# Patient Record
Sex: MALE | Race: WHITE | NOT HISPANIC OR LATINO | Employment: OTHER | URBAN - METROPOLITAN AREA
[De-identification: names, ages, dates, MRNs, and addresses within clinical notes are randomized per-mention and may not be internally consistent; named-entity substitution may affect disease eponyms.]

---

## 2017-01-14 DIAGNOSIS — N40.0 BENIGN PROSTATIC HYPERPLASIA, PRESENCE OF LOWER URINARY TRACT SYMPTOMS UNSPECIFIED, UNSPECIFIED MORPHOLOGY: ICD-10-CM

## 2017-01-14 DIAGNOSIS — Q61.3 POLYCYSTIC KIDNEY DISEASE: ICD-10-CM

## 2017-01-14 DIAGNOSIS — I10 ESSENTIAL HYPERTENSION: Primary | ICD-10-CM

## 2017-01-16 NOTE — TELEPHONE ENCOUNTER
Tamsulosin         Last Written Prescription Date: 01/20/16  Last Fill Quantity: 90, # refills: 3    Last Office Visit with [facility], [facility] or [facility] prescribing provider:  10/27/16   Future Office Visit:      BP Readings from Last 3 Encounters:   10/27/16 126/78   10/12/16 123/74   08/29/16 116/75     Benicar      Last Written Prescription Date: 01/20/16  Last Fill Quantity: 90, # refills: 3  Last Office Visit with [facility], [facility] or [facility] prescribing provider: 10/27/16       POTASSIUM   Date Value Ref Range Status   10/27/2016 4.5 3.4 - 5.3 mmol/L Final     CREATININE   Date Value Ref Range Status   10/27/2016 1.21 0.66 - 1.25 mg/dL Final     BP Readings from Last 3 Encounters:   10/27/16 126/78   10/12/16 123/74   08/29/16 116/75     Moon See MA

## 2017-01-17 RX ORDER — TAMSULOSIN HYDROCHLORIDE 0.4 MG/1
CAPSULE ORAL
Qty: 30 CAPSULE | Refills: 0 | Status: SHIPPED | OUTPATIENT
Start: 2017-01-17 | End: 2017-02-03

## 2017-01-17 RX ORDER — OLMESARTAN MEDOXOMIL 40 MG/1
TABLET, FILM COATED ORAL
Qty: 30 TABLET | Refills: 0 | Status: SHIPPED | OUTPATIENT
Start: 2017-01-17 | End: 2017-02-03

## 2017-01-17 NOTE — TELEPHONE ENCOUNTER
Routing refill request to provider for review/approval because:  Patient needs to be seen because:  Needs to establish care with Dr Maldonado at the [facility].    PCP:  Medications pended.   Please review and approve as appropriate. Will need diagnosis added to tamsulosin.  Patient has appointment to establish care on 1/26.    Thank you    Jen Yang RN

## 2017-01-17 NOTE — TELEPHONE ENCOUNTER
TC's:    Please call Patient to schedule an appointment to establish care. Former [facility] patient.  Then route back to the refill pool.    Thank you      Jen aYng RN

## 2017-02-03 ENCOUNTER — OFFICE VISIT (OUTPATIENT)
Dept: FAMILY MEDICINE | Facility: CLINIC | Age: 77
End: 2017-02-03
Payer: MEDICARE

## 2017-02-03 VITALS
DIASTOLIC BLOOD PRESSURE: 80 MMHG | HEART RATE: 83 BPM | HEIGHT: 69 IN | BODY MASS INDEX: 40.29 KG/M2 | TEMPERATURE: 97.8 F | OXYGEN SATURATION: 97 % | WEIGHT: 272 LBS | SYSTOLIC BLOOD PRESSURE: 130 MMHG

## 2017-02-03 DIAGNOSIS — E11.22 TYPE 2 DIABETES MELLITUS WITH STAGE 3 CHRONIC KIDNEY DISEASE, WITHOUT LONG-TERM CURRENT USE OF INSULIN (H): Primary | ICD-10-CM

## 2017-02-03 DIAGNOSIS — N18.30 TYPE 2 DIABETES MELLITUS WITH STAGE 3 CHRONIC KIDNEY DISEASE, WITHOUT LONG-TERM CURRENT USE OF INSULIN (H): Primary | ICD-10-CM

## 2017-02-03 DIAGNOSIS — N40.0 BENIGN PROSTATIC HYPERPLASIA, PRESENCE OF LOWER URINARY TRACT SYMPTOMS UNSPECIFIED, UNSPECIFIED MORPHOLOGY: ICD-10-CM

## 2017-02-03 DIAGNOSIS — I10 ESSENTIAL HYPERTENSION: ICD-10-CM

## 2017-02-03 LAB — HBA1C MFR BLD: 6.3 % (ref 4.3–6)

## 2017-02-03 PROCEDURE — 36415 COLL VENOUS BLD VENIPUNCTURE: CPT | Performed by: INTERNAL MEDICINE

## 2017-02-03 PROCEDURE — 83036 HEMOGLOBIN GLYCOSYLATED A1C: CPT | Performed by: INTERNAL MEDICINE

## 2017-02-03 PROCEDURE — 80048 BASIC METABOLIC PNL TOTAL CA: CPT | Performed by: INTERNAL MEDICINE

## 2017-02-03 PROCEDURE — 99214 OFFICE O/P EST MOD 30 MIN: CPT | Performed by: INTERNAL MEDICINE

## 2017-02-03 RX ORDER — TAMSULOSIN HYDROCHLORIDE 0.4 MG/1
CAPSULE ORAL
Qty: 90 CAPSULE | Refills: 1 | Status: SHIPPED | OUTPATIENT
Start: 2017-02-03 | End: 2017-07-25

## 2017-02-03 RX ORDER — LANCETS
1 EACH MISCELLANEOUS DAILY
Qty: 100 EACH | Refills: 11 | Status: ON HOLD | OUTPATIENT
Start: 2017-02-03 | End: 2018-09-27

## 2017-02-03 RX ORDER — METFORMIN HCL 500 MG
TABLET, EXTENDED RELEASE 24 HR ORAL
Qty: 180 TABLET | Refills: 1 | Status: SHIPPED | OUTPATIENT
Start: 2017-02-03 | End: 2017-08-10

## 2017-02-03 RX ORDER — OLMESARTAN MEDOXOMIL 40 MG/1
40 TABLET ORAL DAILY
Qty: 90 TABLET | Refills: 1 | Status: SHIPPED | OUTPATIENT
Start: 2017-02-03 | End: 2017-07-25

## 2017-02-03 ASSESSMENT — ENCOUNTER SYMPTOMS
SHORTNESS OF BREATH: 0
WEIGHT LOSS: 0
HEADACHES: 0
PALPITATIONS: 0
NAUSEA: 0
ORTHOPNEA: 0
VOMITING: 0
ABDOMINAL PAIN: 0
FREQUENCY: 0
DIZZINESS: 0
PND: 0
DYSURIA: 0
COUGH: 0
SENSORY CHANGE: 0
FOCAL WEAKNESS: 0
CLAUDICATION: 0
HEMATURIA: 0

## 2017-02-03 NOTE — NURSING NOTE
"Chief Complaint   Patient presents with     Establish Care       Initial /85 mmHg  Pulse 83  Temp(Src) 97.8  F (36.6  C) (Oral)  Ht 5' 9\" (1.753 m)  Wt 272 lb (123.378 kg)  BMI 40.15 kg/m2  SpO2 97% Estimated body mass index is 40.15 kg/(m^2) as calculated from the following:    Height as of this encounter: 5' 9\" (1.753 m).    Weight as of this encounter: 272 lb (123.378 kg).  BP completed using cuff size: leti COTE CMA      "

## 2017-02-03 NOTE — PROGRESS NOTES
"HPI Comments:   Patient is a known diabetic and is currently on oral monotherapy with metformin. He checks his capillary blood glucose at home about 3 times a week and usually gets readings between 100-130.  His hemoglobin A1c today is 6.3.    He also has a history of hypertension and is currently on Benicar.  He is tolerating the medication well and needs a refill.    Also has a history of BPH and is on Flomax. Medication is working well for him.      Past Medical History   Diagnosis Date     Calculus of kidney 2002     with lithrotripsy      Osteomyelitis (H)      toe amputation     Diabetes mellitus (H)      BPH (benign prostatic hyperplasia)      flomax      Osteoarthritis, knee      has had synvisc type shot      Benign essential hypertension      Melanoma (H)        Review of Systems   Constitutional: Negative for weight loss and malaise/fatigue.   Respiratory: Negative for cough and shortness of breath.    Cardiovascular: Negative for chest pain, palpitations, orthopnea, claudication, leg swelling and PND.   Gastrointestinal: Negative for nausea, vomiting and abdominal pain.   Genitourinary: Negative for dysuria, urgency, frequency and hematuria.   Neurological: Negative for dizziness, sensory change, focal weakness and headaches.       /80 mmHg  Pulse 83  Temp(Src) 97.8  F (36.6  C) (Oral)  Ht 5' 9\" (1.753 m)  Wt 272 lb (123.378 kg)  BMI 40.15 kg/m2  SpO2 97%      Physical Exam   Constitutional: He is oriented to person, place, and time. No distress.   Eyes: Conjunctivae and EOM are normal. Pupils are equal, round, and reactive to light.   Neck: No thyromegaly present.   Cardiovascular: Normal rate, regular rhythm and normal heart sounds.    Pulmonary/Chest: Effort normal and breath sounds normal. No respiratory distress.   Musculoskeletal: He exhibits no edema.   Neurological: He is alert and oriented to person, place, and time. He has normal reflexes. Coordination normal. GCS score is 15. "   Nursing note and vitals reviewed.        ICD-10-CM    1. Type 2 diabetes mellitus with stage 3 chronic kidney disease, without long-term current use of insulin (H) E11.22 Hemoglobin A1c    N18.3 Basic metabolic panel     metFORMIN (GLUCOPHAGE-XR) 500 MG 24 hr tablet     blood glucose monitoring (RELION ULTIMA TEST) test strip     RELION ULTRA THIN PLUS LANCETS MISC   2. Essential hypertension I10 olmesartan (BENICAR) 40 MG tablet   3. Benign prostatic hyperplasia, presence of lower urinary tract symptoms unspecified, unspecified morphology N40.0 tamsulosin (FLOMAX) 0.4 MG capsule       Patient Instructions   Maintain low fat/calorie diet and regular exercise.    Follow up 6 months.

## 2017-02-03 NOTE — MR AVS SNAPSHOT
"              After Visit Summary   2/3/2017    Ross Caballero    MRN: [id number]           Patient Information     Date Of Birth          1940        Visit Information        Provider Department      2/3/2017 11:30 AM Errol Maldonado MD [facility]        Today's Diagnoses     Type 2 diabetes mellitus with stage 3 chronic kidney disease, without long-term current use of insulin (H)    -  1     Essential hypertension         Type 2 diabetes mellitus without complication, without long-term current use of insulin (H)         Benign prostatic hyperplasia, presence of lower urinary tract symptoms unspecified, unspecified morphology         Type 2 diabetes mellitus with other specified complication (H)           Care Instructions    Maintain low fat/calorie diet and regular exercise.    Follow up 6 months.        Follow-ups after your visit        Who to contact     If you have questions or need follow up information about today's clinic visit or your schedule please contact [facility] directly at [phone].  Normal or non-critical lab and imaging results will be communicated to you by MyChart, letter or phone within 4 business days after the clinic has received the results. If you do not hear from us within 7 days, please contact the clinic through [employer] or phone. If you have a critical or abnormal lab result, we will notify you by phone as soon as possible.  Submit refill requests through [employer] or call your pharmacy and they will forward the refill request to us. Please allow 3 business days for your refill to be completed.          Additional Information About Your Visit        [employer] Information     [employer] lets you send messages to your doctor, view your test results, renew your prescriptions, schedule appointments and more. To sign up, go to [email or web address] . Click on \"Log in\" on the left side of the screen, which will take you to the Welcome page. Then " "click on \"Sign up Now\" on the right side of the page.     You will be asked to enter the access code listed below, as well as some personal information. Please follow the directions to create your username and password.     Your access code is: [email or web address]  Expires: 2017 11:33 AM     Your access code will  in 90 days. If you need help or a new code, please call your [place] clinic or [phone].        Care EveryWhere ID     This is your Care EveryWhere ID. This could be used by other organizations to access your [place] medical records  [id number]        Your Vitals Were     Pulse Temperature Height BMI (Body Mass Index) Pulse Oximetry       83 97.8  F (36.6  C) (Oral) 5' 9\" (1.753 m) 40.15 kg/m2 97%        Blood Pressure from Last 3 Encounters:   17 141/85   10/27/16 126/78   10/12/16 123/74    Weight from Last 3 Encounters:   17 272 lb (123.378 kg)   10/27/16 270 lb (122.471 kg)   10/12/16 250 lb (113.399 kg)              We Performed the Following     Basic metabolic panel     Hemoglobin A1c          Today's Medication Changes          These changes are accurate as of: 2/3/17 11:33 AM.  If you have any questions, ask your nurse or doctor.               These medicines have changed or have updated prescriptions.        Dose/Directions    metFORMIN 500 MG 24 hr tablet   Commonly known as:  GLUCOPHAGE-XR   This may have changed:  See the new instructions.   Used for:  Type 2 diabetes mellitus without complication, without long-term current use of insulin (H)   Changed by:  Errol Maldonado MD        TAKE TWO TABLETS BY MOUTH ONCE DAILY WITH DINNER   Quantity:  180 tablet   Refills:  1       olmesartan 40 MG tablet   Commonly known as:  BENICAR   This may have changed:  See the new instructions.   Used for:  Essential hypertension   Changed by:  Errol Maldonado MD        Dose:  40 mg   Take 1 tablet (40 mg) by mouth daily   Quantity:  90 tablet "   Refills:  1       tamsulosin 0.4 MG capsule   Commonly known as:  FLOMAX   This may have changed:  See the new instructions.   Used for:  Benign prostatic hyperplasia, presence of lower urinary tract symptoms unspecified, unspecified morphology   Changed by:  Errol Maldonado MD        TAKE 1 CAPSULE AT BEDTIME   Quantity:  90 capsule   Refills:  1         Stop taking these medicines if you haven't already. Please contact your care team if you have questions.     HYDROcodone-acetaminophen 5-325 MG per tablet   Commonly known as:  NORCO   Stopped by:  Errol Maldonado MD                Where to get your medicines      These medications were sent to [facility] [place], [place] - [address] [address] to [address] [address]     Phone:  [phone]    - metFORMIN 500 MG 24 hr tablet  - olmesartan 40 MG tablet  - tamsulosin 0.4 MG capsule      These medications were sent to [facility] [address] [place] [address] - [address] [facility]  [address] [facility] [address]     Phone:  [phone]    - blood glucose monitoring test strip  - RELION ULTRA THIN PLUS LANCETS [facility]             Primary Care Provider Office Phone # Fax #    Errol Maldonado [phone]       [facility] [address]   [facility] [address]        Thank you!     Thank you for choosing [facility]  for your care. Our goal is always to provide you with excellent care. Hearing back from our patients is one way we can continue to improve our services. Please take a few minutes to complete the written survey that you may receive in the mail after your visit with us. Thank you!             Your Updated Medication List - Protect others around you: Learn how to safely use, store and throw away your medicines at www.disposemymeds.org.          This list is accurate as of:  2/3/17 11:33 AM.  Always use your most recent med list.                   Brand Name Dispense Instructions for use    blood glucose monitoring test strip    RELION ULTIMA TEST    1 Box    Use to test blood sugar 1 times daily or as directed.  Ok to substitute alternative if insurance prefers.       metFORMIN 500 MG 24 hr tablet    GLUCOPHAGE-XR    180 tablet    TAKE TWO TABLETS BY MOUTH ONCE DAILY WITH DINNER       multivitamin, therapeutic Tabs tablet      Take 1 tablet by mouth daily       olmesartan 40 MG tablet    BENICAR    90 tablet    Take 1 tablet (40 mg) by mouth daily       RELION ULTIMA GLUCOSE SYSTEM W/DEVICE Kit     1 kit    1 kit daily Use to test blood sugars 1 times daily or as directed.  Ok to substitute alternative if insurance prefers.       RELION ULTRA THIN PLUS LANCETS Misc     100 each    1 lancet daily Use to test blood sugars 1 times daily or as directed.  Ok to substitute alternative if insurance prefers.       tamsulosin 0.4 MG capsule    FLOMAX    90 capsule    TAKE 1 CAPSULE AT BEDTIME

## 2017-02-03 NOTE — Clinical Note
[facility]  [address]  [address]  Tel: [phone]    February 10, 2017    Ross Caballero  [address] [facility] [address]        Good day to you Mr. Caballero.    As we discussed over the phone, your diabetes is well controlled as reflected by your excellent hemoglobin A1c of 6.3 (our goal is to keep it below 8).    Your metabolic panel shows that your kidney function (creatinine) is still within acceptable limits and has not significantly changed.    Keep up the good work in controlling your diabetes.    If you have any further questions or problems, please contact our office.      Sincerely,    Errol Maldonado MD/ubaldo    Results for orders placed or performed in visit on 02/03/17   Hemoglobin A1c   Result Value Ref Range    Hemoglobin A1C 6.3 (H) 4.3 - 6.0 %   Basic metabolic panel   Result Value Ref Range    Sodium 142 133 - 144 mmol/L    Potassium 5.0 3.4 - 5.3 mmol/L    Chloride 107 94 - 109 mmol/L    Carbon Dioxide 28 20 - 32 mmol/L    Anion Gap 7 3 - 14 mmol/L    Glucose 102 (H) 70 - 99 mg/dL    Urea Nitrogen 34 (H) 7 - 30 mg/dL    Creatinine 1.39 (H) 0.66 - 1.25 mg/dL    GFR Estimate 50 (L) >60 mL/min/1.7m2    GFR Estimate If Black 60 (L) >60 mL/min/1.7m2    Calcium 9.6 8.5 - 10.1 mg/dL           Enclosure: Lab Results

## 2017-02-04 LAB
ANION GAP SERPL CALCULATED.3IONS-SCNC: 7 MMOL/L (ref 3–14)
BUN SERPL-MCNC: 34 MG/DL (ref 7–30)
CALCIUM SERPL-MCNC: 9.6 MG/DL (ref 8.5–10.1)
CHLORIDE SERPL-SCNC: 107 MMOL/L (ref 94–109)
CO2 SERPL-SCNC: 28 MMOL/L (ref 20–32)
CREAT SERPL-MCNC: 1.39 MG/DL (ref 0.66–1.25)
GFR SERPL CREATININE-BSD FRML MDRD: 50 ML/MIN/1.7M2
GLUCOSE SERPL-MCNC: 102 MG/DL (ref 70–99)
POTASSIUM SERPL-SCNC: 5 MMOL/L (ref 3.4–5.3)
SODIUM SERPL-SCNC: 142 MMOL/L (ref 133–144)

## 2017-02-10 ENCOUNTER — TELEPHONE (OUTPATIENT)
Dept: FAMILY MEDICINE | Facility: CLINIC | Age: 77
End: 2017-02-10

## 2017-02-10 NOTE — TELEPHONE ENCOUNTER
Patient is requesting lab results please review and advise.  Radha Moffett- [facility]

## 2017-07-25 DIAGNOSIS — N40.0 BENIGN PROSTATIC HYPERPLASIA, PRESENCE OF LOWER URINARY TRACT SYMPTOMS UNSPECIFIED: Primary | ICD-10-CM

## 2017-07-25 DIAGNOSIS — I10 ESSENTIAL HYPERTENSION: ICD-10-CM

## 2017-07-25 NOTE — TELEPHONE ENCOUNTER
Pending Prescriptions:                       Disp   Refills    tamsulosin (FLOMAX) 0.4 MG capsule        90 cap*1            Sig: TAKE 1 CAPSULE AT BEDTIME    olmesartan (BENICAR) 40 MG tablet         90 tab*1            Sig: Take 1 tablet (40 mg) by mouth daily    Tamsulosin         Last Written Prescription Date: 2/3/17  Last Fill Quantity: 90, # refills: 1    Last Office Visit with [facility] or  Health prescribing provider:  2/3/17 Joel   Future Office Visit:      BP Readings from Last 3 Encounters:   02/03/17 130/80   10/27/16 126/78   10/12/16 123/74     Benicar      Last Written Prescription Date: 2/3/17  Last Fill Quantity: 90, # refills: 1  Last Office Visit with [facility], [facility] or [facility] prescribing provider: 2/3/17 Joel       Potassium   Date Value Ref Range Status   02/03/2017 5.0 3.4 - 5.3 mmol/L Final     Creatinine   Date Value Ref Range Status   02/03/2017 1.39 (H) 0.66 - 1.25 mg/dL Final     BP Readings from Last 3 Encounters:   02/03/17 130/80   10/27/16 126/78   10/12/16 123/74     Shannan Castro RT(R)

## 2017-07-27 NOTE — TELEPHONE ENCOUNTER
Routing request to TCs to inform patient due for office visit.  Please route requests to MD once patient is scheduled as:  1) Creatinine out of range  2) (BPH diagnosis Tamsulosin previously sent under) is not on patient's problem list - will need to be associated in    Diana COTE RN

## 2017-08-02 RX ORDER — TAMSULOSIN HYDROCHLORIDE 0.4 MG/1
CAPSULE ORAL
Qty: 90 CAPSULE | Refills: 3 | Status: SHIPPED | OUTPATIENT
Start: 2017-08-02 | End: 2017-08-10

## 2017-08-02 RX ORDER — OLMESARTAN MEDOXOMIL 40 MG/1
40 TABLET ORAL DAILY
Qty: 90 TABLET | Refills: 3 | Status: SHIPPED | OUTPATIENT
Start: 2017-08-02 | End: 2017-08-10

## 2017-08-02 NOTE — TELEPHONE ENCOUNTER
EF,  Please see below messages and advise on refills  Pt is scheduled to see you 8/9/2017  Elizabeth KWAN RN

## 2017-08-10 ENCOUNTER — OFFICE VISIT (OUTPATIENT)
Dept: FAMILY MEDICINE | Facility: CLINIC | Age: 77
End: 2017-08-10
Payer: MEDICARE

## 2017-08-10 VITALS
HEART RATE: 88 BPM | WEIGHT: 284.7 LBS | BODY MASS INDEX: 42.17 KG/M2 | SYSTOLIC BLOOD PRESSURE: 124 MMHG | HEIGHT: 69 IN | DIASTOLIC BLOOD PRESSURE: 77 MMHG | TEMPERATURE: 98 F | OXYGEN SATURATION: 95 %

## 2017-08-10 DIAGNOSIS — G62.9 NEUROPATHY: ICD-10-CM

## 2017-08-10 DIAGNOSIS — N40.0 BENIGN PROSTATIC HYPERPLASIA, PRESENCE OF LOWER URINARY TRACT SYMPTOMS UNSPECIFIED: ICD-10-CM

## 2017-08-10 DIAGNOSIS — E11.22 TYPE 2 DIABETES MELLITUS WITH STAGE 3 CHRONIC KIDNEY DISEASE, WITHOUT LONG-TERM CURRENT USE OF INSULIN (H): Primary | ICD-10-CM

## 2017-08-10 DIAGNOSIS — H61.21 IMPACTED CERUMEN OF RIGHT EAR: ICD-10-CM

## 2017-08-10 DIAGNOSIS — N18.30 TYPE 2 DIABETES MELLITUS WITH STAGE 3 CHRONIC KIDNEY DISEASE, WITHOUT LONG-TERM CURRENT USE OF INSULIN (H): Primary | ICD-10-CM

## 2017-08-10 DIAGNOSIS — N52.9 ERECTILE DYSFUNCTION, UNSPECIFIED ERECTILE DYSFUNCTION TYPE: ICD-10-CM

## 2017-08-10 DIAGNOSIS — I10 ESSENTIAL HYPERTENSION: ICD-10-CM

## 2017-08-10 LAB — HBA1C MFR BLD: 6.2 % (ref 4.3–6)

## 2017-08-10 PROCEDURE — 83036 HEMOGLOBIN GLYCOSYLATED A1C: CPT | Performed by: INTERNAL MEDICINE

## 2017-08-10 PROCEDURE — 83721 ASSAY OF BLOOD LIPOPROTEIN: CPT | Performed by: INTERNAL MEDICINE

## 2017-08-10 PROCEDURE — 99214 OFFICE O/P EST MOD 30 MIN: CPT | Performed by: INTERNAL MEDICINE

## 2017-08-10 PROCEDURE — 36415 COLL VENOUS BLD VENIPUNCTURE: CPT | Performed by: INTERNAL MEDICINE

## 2017-08-10 PROCEDURE — 82043 UR ALBUMIN QUANTITATIVE: CPT | Performed by: INTERNAL MEDICINE

## 2017-08-10 RX ORDER — GABAPENTIN 100 MG/1
100 CAPSULE ORAL
COMMUNITY
Start: 2016-11-18 | End: 2017-08-10

## 2017-08-10 RX ORDER — GABAPENTIN 100 MG/1
100 CAPSULE ORAL 2 TIMES DAILY
Qty: 90 CAPSULE | Refills: 3 | Status: SHIPPED | OUTPATIENT
Start: 2017-08-10 | End: 2018-05-18

## 2017-08-10 RX ORDER — TAMSULOSIN HYDROCHLORIDE 0.4 MG/1
CAPSULE ORAL
Qty: 90 CAPSULE | Refills: 3 | Status: SHIPPED | OUTPATIENT
Start: 2017-08-10 | End: 2018-05-03

## 2017-08-10 RX ORDER — OLMESARTAN MEDOXOMIL 40 MG/1
40 TABLET ORAL DAILY
Qty: 90 TABLET | Refills: 3 | Status: SHIPPED | OUTPATIENT
Start: 2017-08-10 | End: 2018-05-18

## 2017-08-10 RX ORDER — TADALAFIL 20 MG/1
20 TABLET ORAL DAILY PRN
Qty: 10 TABLET | Refills: 3 | Status: SHIPPED | OUTPATIENT
Start: 2017-08-10 | End: 2018-09-14

## 2017-08-10 RX ORDER — METFORMIN HCL 500 MG
TABLET, EXTENDED RELEASE 24 HR ORAL
Qty: 180 TABLET | Refills: 3 | Status: SHIPPED | OUTPATIENT
Start: 2017-08-10 | End: 2018-05-18

## 2017-08-10 ASSESSMENT — ENCOUNTER SYMPTOMS
PND: 0
TINGLING: 0
POLYDIPSIA: 0
WEIGHT LOSS: 0
CLAUDICATION: 0
EYE PAIN: 0
DIZZINESS: 0
DIARRHEA: 0
SHORTNESS OF BREATH: 0
VOMITING: 0
COUGH: 0
ABDOMINAL PAIN: 0
FOCAL WEAKNESS: 0
SENSORY CHANGE: 0
HEADACHES: 0
ORTHOPNEA: 0
PALPITATIONS: 0
NAUSEA: 0
BLURRED VISION: 0

## 2017-08-10 NOTE — LETTER
[facility]  [address] [facility]  [address] [place] [address]  [address]  Tel: [phone]    August 16, 2017    Ross Caballero  [address] [facility] [address]        Dear Mr. Caballero,    Good day to you Mr. Caballero.    Your LDL cholesterol level is mildly elevated and this along with your diabetes can increase her risk for heart disease.  There is no need for medical treatment at this point.  This would improve with stricter low-fat diet and regular exercise.    If you have any further questions or problems, please contact our office.      Sincerely,    Errol Maldonado MD/ Shoshana COTE CMA  Results for orders placed or performed in visit on 08/10/17   HEMOGLOBIN A1C   Result Value Ref Range    Hemoglobin A1C 6.2 (H) 4.3 - 6.0 %   Albumin Random Urine Quantitative   Result Value Ref Range    Creatinine Urine 80 mg/dL    Albumin Urine mg/L 122 mg/L    Albumin Urine mg/g Cr 153.07 (H) 0 - 17 mg/g Cr   LDL cholesterol direct   Result Value Ref Range    LDL Cholesterol Direct 103 (H) <100 mg/dL               Enclosure: Lab Results

## 2017-08-10 NOTE — PROGRESS NOTES
"HPI Comments:   Patient comes in today to follow-up for his chronic medical illnesses.    Patient is a known diabetic and is currently on oral monotherapy with metformin. His hemoglobin A1c today is 6.2 (it was 6.3 six months ago).     He also has a history of hypertension and is currently on Benicar.  He is tolerating the medication well and needs a refill.     Also has a history of BPH and is on Flomax. Medication is working well for him.    Patient presented with a new complaint of difficulty with initiating erections. No other genitourinary symptoms. Is interested in trying medications for erectile dysfunction.      Past Medical History:   Diagnosis Date     Benign essential hypertension      BPH (benign prostatic hyperplasia)     flomax      Calculus of kidney 2002    with lithrotripsy      Diabetes mellitus (H)      Melanoma (H)      Osteoarthritis, knee     has had synvisc type shot      Osteomyelitis (H)     toe amputation       Review of Systems   Constitutional: Negative for malaise/fatigue and weight loss.   Eyes: Negative for blurred vision and pain.   Respiratory: Negative for cough and shortness of breath.    Cardiovascular: Negative for chest pain, palpitations, orthopnea, claudication, leg swelling and PND.   Gastrointestinal: Negative for abdominal pain, diarrhea, nausea and vomiting.   Neurological: Negative for dizziness, tingling, sensory change, focal weakness and headaches.   Endo/Heme/Allergies: Negative for polydipsia.       /77 (BP Location: Left arm, Patient Position: Chair, Cuff Size: Adult Large)  Pulse 88  Temp 98  F (36.7  C) (Oral)  Ht 5' 9\" (1.753 m)  Wt 284 lb 11.2 oz (129.1 kg)  SpO2 95%  BMI 42.04 kg/m2      Physical Exam   Constitutional: He is oriented to person, place, and time. No distress.   HENT:   Impacted cerumen right ear   Eyes: Conjunctivae are normal. Pupils are equal, round, and reactive to light.   Neck: No thyromegaly present.   Cardiovascular: Normal " rate, regular rhythm and normal heart sounds.    Pulmonary/Chest: Effort normal and breath sounds normal. No respiratory distress.   Abdominal: Soft. There is no tenderness.   Musculoskeletal: He exhibits no edema.   Neurological: He is alert and oriented to person, place, and time. Coordination normal. GCS score is 15.   Nursing note and vitals reviewed.        ICD-10-CM    1. Type 2 diabetes mellitus with stage 3 chronic kidney disease, without long-term current use of insulin (H) E11.22 HEMOGLOBIN A1C    N18.3 Albumin Random Urine Quantitative     metFORMIN (GLUCOPHAGE-XR) 500 MG 24 hr tablet     LDL cholesterol direct   2. Essential hypertension I10 olmesartan (BENICAR) 40 MG tablet   3. Neuropathy (H) G62.9 gabapentin (NEURONTIN) 100 MG capsule   4. Erectile dysfunction, unspecified erectile dysfunction type N52.9 tadalafil (CIALIS) 20 MG tablet   5. Benign prostatic hyperplasia, presence of lower urinary tract symptoms unspecified N40.0 tamsulosin (FLOMAX) 0.4 MG capsule   6. Impacted cerumen of right ear H61.21 Procedure note: impacted cerumen was manually removed using alligator forceps followed by water flush; patient tolerated procedure     **please refer to HPI for status of conditions      Patient Instructions   Proceed to the emergency room for any erection lasting that 2 hours.    Maintain low fat/calorie diet and regular exercise.    Follow up yearly or as needed.

## 2017-08-10 NOTE — PATIENT INSTRUCTIONS
Proceed to the emergency room for any erection lasting that 2 hours.    Maintain low fat/calorie diet and regular exercise.    Follow up yearly or as needed.

## 2017-08-10 NOTE — NURSING NOTE
"Chief Complaint   Patient presents with     RECHECK     Diabetic        Initial /77 (BP Location: Left arm, Patient Position: Chair, Cuff Size: Adult Large)  Pulse 88  Temp 98  F (36.7  C) (Oral)  Ht 5' 9\" (1.753 m)  Wt 284 lb 11.2 oz (129.1 kg)  SpO2 95%  BMI 42.04 kg/m2 Estimated body mass index is 42.04 kg/(m^2) as calculated from the following:    Height as of this encounter: 5' 9\" (1.753 m).    Weight as of this encounter: 284 lb 11.2 oz (129.1 kg).  Medication Reconciliation: complete     Trupti Krishnan MA     "

## 2017-08-10 NOTE — MR AVS SNAPSHOT
After Visit Summary   8/10/2017    Ross Caballero    MRN: [id number]           Patient Information     Date Of Birth          1940        Visit Information        Provider Department      8/10/2017 1:15 PM Errol Maldonado MD [facility]        Today's Diagnoses     Neuropathy (H)    -  1    Screening for diabetic peripheral neuropathy        Need for prophylactic vaccination against Streptococcus pneumoniae (pneumococcus)        Type 2 diabetes mellitus with stage 3 chronic kidney disease, without long-term current use of insulin (H)        Essential hypertension        Benign prostatic hyperplasia, presence of lower urinary tract symptoms unspecified        Erectile dysfunction, unspecified erectile dysfunction type          Care Instructions    Proceed to the emergency room for any erection lasting that 2 hours.    Maintain low fat/calorie diet and regular exercise.    Follow up yearly or as needed.            Follow-ups after your visit        Who to contact     If you have questions or need follow up information about today's clinic visit or your schedule please contact [facility] directly at [phone].  Normal or non-critical lab and imaging results will be communicated to you by [employer], letter or phone within 4 business days after the clinic has received the results. If you do not hear from us within 7 days, please contact the clinic through [employer] or phone. If you have a critical or abnormal lab result, we will notify you by phone as soon as possible.  Submit refill requests through [employer] or call your pharmacy and they will forward the refill request to us. Please allow 3 business days for your refill to be completed.          Additional Information About Your Visit        [employer] Information     [employer] lets you send messages to your doctor, view your test results, renew your prescriptions, schedule appointments and more. To sign up, go to  "[email or web address] . Click on \"Log in\" on the left side of the screen, which will take you to the Welcome page. Then click on \"Sign up Now\" on the right side of the page.     You will be asked to enter the access code listed below, as well as some personal information. Please follow the directions to create your username and password.     Your access code is: [email or web address]  Expires: 2017  2:12 PM     Your access code will  in 90 days. If you need help or a new code, please call your [place] clinic or [phone].        Care EveryWhere ID     This is your Care EveryWhere ID. This could be used by other organizations to access your [place] medical records  [id number]        Your Vitals Were     Pulse Temperature Height Pulse Oximetry BMI (Body Mass Index)       88 98  F (36.7  C) (Oral) 5' 9\" (1.753 m) 95% 42.04 kg/m2        Blood Pressure from Last 3 Encounters:   08/10/17 124/77   17 130/80   10/27/16 126/78    Weight from Last 3 Encounters:   08/10/17 284 lb 11.2 oz (129.1 kg)   17 272 lb (123.4 kg)   10/27/16 270 lb (122.5 kg)              We Performed the Following     Albumin Random Urine Quantitative     FOOT EXAM  NO CHARGE [85020.114]     HEMOGLOBIN A1C     LDL cholesterol direct          Today's Medication Changes          These changes are accurate as of: 8/10/17  2:12 PM.  If you have any questions, ask your nurse or doctor.               Start taking these medicines.        Dose/Directions    tadalafil 20 MG tablet   Commonly known as:  CIALIS   Used for:  Erectile dysfunction, unspecified erectile dysfunction type   Started by:  Errol Maldonado MD        Dose:  20 mg   Take 1 tablet (20 mg) by mouth daily as needed for erectile dysfunction   Quantity:  10 tablet   Refills:  3         These medicines have changed or have updated prescriptions.        Dose/Directions    gabapentin 100 MG capsule   Commonly known as:  NEURONTIN   This may have changed:  " when to take this   Used for:  Neuropathy (H)   Changed by:  Errol Maldonado MD        Dose:  100 mg   Take 1 capsule (100 mg) by mouth 2 times daily   Quantity:  90 capsule   Refills:  3            Where to get your medicines      These medications were sent to [facility] - [place], [place] - [address] [place] [address] [address], [facility] [address]     Phone:  [phone]     gabapentin 100 MG capsule    metFORMIN 500 MG 24 hr tablet    olmesartan 40 MG tablet    tamsulosin 0.4 MG capsule         These medications were sent to [id number] - [place], [place] - [address]  [address] [facility] [address]     Phone:  [phone]     tadalafil 20 MG tablet                Primary Care Provider Office Phone # Fax #    Errol Maldonado [phone] [address] [facility] [address]  [facility] [address]        Equal Access to Services     [facility] AH: Hadii aad ku hadasho Soomaali, waaxda luqadaha, qaybta kaalmada adeegyada, waxay idiin hayaan vlad gates . So [facility] [phone].    ATENCIÓN: Si habla español, tiene a sebastian disposición servicios gratuitos de asistencia lingüística. [facility] [phone].    We comply with applicable federal civil rights laws and [place] laws. We do not discriminate on the basis of race, color, national origin, age, disability sex, sexual orientation or gender identity.            Thank you!     Thank you for choosing [facility]  for your care. Our goal is always to provide you with excellent care. Hearing back from our patients is one way we can continue to improve our services. Please take a few minutes to complete the written survey that you may receive in the mail after your visit with us. Thank you!             Your Updated Medication List - Protect others around you: Learn how to safely use, store and throw away your medicines at www.disposemymeds.org.           This list is accurate as of: 8/10/17  2:12 PM.  Always use your most recent med list.                   Brand Name Dispense Instructions for use Diagnosis    blood glucose monitoring test strip    RELION ULTIMA TEST    1 Box    Use to test blood sugar 1 times daily or as directed.  Ok to substitute alternative if insurance prefers.    Type 2 diabetes mellitus with stage 3 chronic kidney disease, without long-term current use of insulin (H)       gabapentin 100 MG capsule    NEURONTIN    90 capsule    Take 1 capsule (100 mg) by mouth 2 times daily    Neuropathy (H)       metFORMIN 500 MG 24 hr tablet    GLUCOPHAGE-XR    180 tablet    TAKE TWO TABLETS BY MOUTH ONCE DAILY WITH DINNER    Type 2 diabetes mellitus with stage 3 chronic kidney disease, without long-term current use of insulin (H)       multivitamin, therapeutic Tabs tablet      Take 1 tablet by mouth daily        olmesartan 40 MG tablet    BENICAR    90 tablet    Take 1 tablet (40 mg) by mouth daily    Essential hypertension       RELION ULTIMA GLUCOSE SYSTEM W/DEVICE Kit     1 kit    1 kit daily Use to test blood sugars 1 times daily or as directed.  Ok to substitute alternative if insurance prefers.    Type 2 diabetes mellitus with other specified complication (H)       RELION ULTRA THIN PLUS LANCETS Misc     100 each    1 lancet daily Use to test blood sugars 1 times daily or as directed.  Ok to substitute alternative if insurance prefers.    Type 2 diabetes mellitus with stage 3 chronic kidney disease, without long-term current use of insulin (H)       tadalafil 20 MG tablet    CIALIS    10 tablet    Take 1 tablet (20 mg) by mouth daily as needed for erectile dysfunction    Erectile dysfunction, unspecified erectile dysfunction type       tamsulosin 0.4 MG capsule    FLOMAX    90 capsule    TAKE 1 CAPSULE AT BEDTIME    Benign prostatic hyperplasia, presence of lower urinary tract symptoms unspecified

## 2017-08-11 LAB
CREAT UR-MCNC: 80 MG/DL
LDLC SERPL DIRECT ASSAY-MCNC: 103 MG/DL
MICROALBUMIN UR-MCNC: 122 MG/L
MICROALBUMIN/CREAT UR: 153.07 MG/G CR (ref 0–17)

## 2018-02-21 ENCOUNTER — APPOINTMENT (OUTPATIENT)
Dept: GENERAL RADIOLOGY | Facility: CLINIC | Age: 78
End: 2018-02-21
Attending: EMERGENCY MEDICINE
Payer: MEDICARE

## 2018-02-21 ENCOUNTER — HOSPITAL ENCOUNTER (EMERGENCY)
Facility: CLINIC | Age: 78
Discharge: HOME | End: 2018-02-21
Attending: EMERGENCY MEDICINE | Admitting: EMERGENCY MEDICINE
Payer: MEDICARE

## 2018-02-21 VITALS
OXYGEN SATURATION: 98 % | HEART RATE: 83 BPM | RESPIRATION RATE: 16 BRPM | WEIGHT: 283 LBS | BODY MASS INDEX: 41.92 KG/M2 | HEIGHT: 69 IN | TEMPERATURE: 96.9 F | DIASTOLIC BLOOD PRESSURE: 74 MMHG | SYSTOLIC BLOOD PRESSURE: 104 MMHG

## 2018-02-21 DIAGNOSIS — M79.672 LEFT FOOT PAIN: ICD-10-CM

## 2018-02-21 DIAGNOSIS — M19.90 ARTHRITIS: ICD-10-CM

## 2018-02-21 PROCEDURE — 25000132 ZZH RX MED GY IP 250 OP 250 PS 637: Mod: GY | Performed by: EMERGENCY MEDICINE

## 2018-02-21 PROCEDURE — 99283 EMERGENCY DEPT VISIT LOW MDM: CPT

## 2018-02-21 PROCEDURE — 73630 X-RAY EXAM OF FOOT: CPT | Mod: LT

## 2018-02-21 PROCEDURE — A9270 NON-COVERED ITEM OR SERVICE: HCPCS | Mod: GY | Performed by: EMERGENCY MEDICINE

## 2018-02-21 RX ORDER — HYDROCODONE BITARTRATE AND ACETAMINOPHEN 5; 325 MG/1; MG/1
1-2 TABLET ORAL EVERY 6 HOURS PRN
Qty: 8 TABLET | Refills: 0 | Status: SHIPPED | OUTPATIENT
Start: 2018-02-21 | End: 2018-09-14

## 2018-02-21 RX ORDER — HYDROCODONE BITARTRATE AND ACETAMINOPHEN 5; 325 MG/1; MG/1
1 TABLET ORAL ONCE
Status: COMPLETED | OUTPATIENT
Start: 2018-02-21 | End: 2018-02-21

## 2018-02-21 RX ADMIN — HYDROCODONE BITARTRATE AND ACETAMINOPHEN 1 TABLET: 5; 325 TABLET ORAL at 07:20

## 2018-02-21 ASSESSMENT — ENCOUNTER SYMPTOMS
ARTHRALGIAS: 1
COLOR CHANGE: 1

## 2018-02-21 NOTE — DISCHARGE INSTRUCTIONS
Treating Arthritis in the Foot  If your symptoms are mild, medications may be enough to reduce pain and swelling. For more severe arthritis, surgery may be needed to improve the condition of the joint.    Medicine  Your doctor may prescribe medicine--pills or injections--to limit pain and swelling. Ice, aspirin, acetaminophen, or ibuprofen may help relieve mild symptoms that occur after activity.  Surgery and bone trimming  To ease movement and reduce pain, your doctor may trim damaged bone. If arthritis is severe, the joint may be fused or removed. If the bone is not damaged too badly, your doctor may simply shave away bone spurs. Any excess bone growth related to a bunion may also be trimmed.  Fusing joints  If damage is more severe, your doctor may fuse the joint to prevent the bones from rubbing. Afterward, staples, plates, or screws may hold the bones in place so they heal properly. In some cases, the joint may be removed and replaced with an implant.  After surgery  During the early stages of recovery, your foot is likely to be bandaged and immobilized for a while. For best results, follow up with your doctor as scheduled. These visits help ensure that your foot heals properly.  As you heal  After surgery, you ll be told how to care for your incision and how soon to begin walking on the foot. Until the foot can bear weight, you may need to walk with crutches or a cane.  For surgery on the big toe, your foot may be splinted to limit movement for several weeks. Despite this, you should be able to walk soon after surgery.  For surgery on rear or midfoot joints, you may need to wear a cast or surgical shoe. These joints are fairly large, so full recovery may take a few months. Once the bone has healed, any staples, plates, or screws may be removed.  Date Last Reviewed: 7/1/2016 2000-2017 [employer]. [address] [place] [address], [place] [address]. All rights reserved. This information is not intended  as a substitute for professional medical care. Always follow your healthcare professional's instructions.

## 2018-02-21 NOTE — ED AVS SNAPSHOT
Emergency Department    [address] [place] [address]    Phone:  [phone]    Fax:  [phone]                                       Ross Caballero   MRN: [id number]    Department:   Emergency Department   Date of Visit:  2/21/2018           After Visit Summary Signature Page     I have received my discharge instructions, and my questions have been answered. I have discussed any challenges I see with this plan with the nurse or doctor.    ..........................................................................................................................................  Patient/Patient Representative Signature      ..........................................................................................................................................  Patient Representative Print Name and Relationship to Patient    ..................................................               ................................................  Date                                            Time    ..........................................................................................................................................  Reviewed by Signature/Title    ...................................................              ..............................................  Date                                                            Time

## 2018-02-21 NOTE — ED AVS SNAPSHOT
Emergency Department    [address] [facility] [address]    Phone:  [phone]    Fax:  [phone]                                       Ross Caballero   MRN: [id number]    Department:   Emergency Department   Date of Visit:  2/21/2018           Patient Information     Date Of Birth          1940        Your diagnoses for this visit were:     Left foot pain     Arthritis        You were seen by Nima Chapa MD.      Follow-up Information     Follow up with Errol Maldonado MD. Schedule an appointment as soon as possible for a visit in 1 week.    Specialty:  Internal Medicine    Why:  For repeat evaluation and symptom check    Contact information:    [address] [id number] [phone]          Follow up with  Emergency Department.    Specialty:  EMERGENCY MEDICINE    Why:  If symptoms worsen    Contact information:    [address] [facility] [id number] [phone]        Discharge Instructions         Treating Arthritis in the Foot  If your symptoms are mild, medications may be enough to reduce pain and swelling. For more severe arthritis, surgery may be needed to improve the condition of the joint.    Medicine  Your doctor may prescribe medicine--pills or injections--to limit pain and swelling. Ice, aspirin, acetaminophen, or ibuprofen may help relieve mild symptoms that occur after activity.  Surgery and bone trimming  To ease movement and reduce pain, your doctor may trim damaged bone. If arthritis is severe, the joint may be fused or removed. If the bone is not damaged too badly, your doctor may simply shave away bone spurs. Any excess bone growth related to a bunion may also be trimmed.  Fusing joints  If damage is more severe, your doctor may fuse the joint to prevent the bones from rubbing. Afterward, staples, plates, or screws may hold the bones in place so they heal properly. In some cases, the  joint may be removed and replaced with an implant.  After surgery  During the early stages of recovery, your foot is likely to be bandaged and immobilized for a while. For best results, follow up with your doctor as scheduled. These visits help ensure that your foot heals properly.  As you heal  After surgery, you ll be told how to care for your incision and how soon to begin walking on the foot. Until the foot can bear weight, you may need to walk with crutches or a cane.  For surgery on the big toe, your foot may be splinted to limit movement for several weeks. Despite this, you should be able to walk soon after surgery.  For surgery on rear or midfoot joints, you may need to wear a cast or surgical shoe. These joints are fairly large, so full recovery may take a few months. Once the bone has healed, any staples, plates, or screws may be removed.  Date Last Reviewed: 7/1/2016 2000-2017 [employer]. [address] [place] [address]. All rights reserved. This information is not intended as a substitute for professional medical care. Always follow your healthcare professional's instructions.          24 Hour Appointment Hotline       To make an appointment at any [facility], call [phone] ([phone]). If you don't have a family doctor or clinic, we will help you find one. [place] clinics are conveniently located to serve the needs of you and your family.             Review of your medicines      START taking        Dose / Directions Last dose taken    HYDROcodone-acetaminophen 5-325 MG per tablet   Commonly known as:  NORCO   Dose:  1-2 tablet   Quantity:  8 tablet        Take 1-2 tablets by mouth every 6 hours as needed for moderate to severe pain   Refills:  0          Our records show that you are taking the medicines listed below. If these are incorrect, please call your family doctor or clinic.        Dose / Directions Last dose taken    blood glucose monitoring test  strip   Commonly known as:  RELION ULTIMA TEST   Quantity:  1 Box        Use to test blood sugar 1 times daily or as directed.  Ok to substitute alternative if insurance prefers.   Refills:  11        gabapentin 100 MG capsule   Commonly known as:  NEURONTIN   Dose:  100 mg   Quantity:  90 capsule        Take 1 capsule (100 mg) by mouth 2 times daily   Refills:  3        metFORMIN 500 MG 24 hr tablet   Commonly known as:  GLUCOPHAGE-XR   Quantity:  180 tablet        TAKE TWO TABLETS BY MOUTH ONCE DAILY WITH DINNER   Refills:  3        multivitamin, therapeutic Tabs tablet   Dose:  1 tablet        Take 1 tablet by mouth daily   Refills:  0        olmesartan 40 MG tablet   Commonly known as:  BENICAR   Dose:  40 mg   Quantity:  90 tablet        Take 1 tablet (40 mg) by mouth daily   Refills:  3        RELION ULTIMA GLUCOSE SYSTEM W/DEVICE Kit   Dose:  1 kit   Quantity:  1 kit        1 kit daily Use to test blood sugars 1 times daily or as directed.  Ok to substitute alternative if insurance prefers.   Refills:  0        RELION ULTRA THIN PLUS LANCETS Misc   Dose:  1 lancet   Quantity:  100 each        1 lancet daily Use to test blood sugars 1 times daily or as directed.  Ok to substitute alternative if insurance prefers.   Refills:  11        tadalafil 20 MG tablet   Commonly known as:  CIALIS   Dose:  20 mg   Quantity:  10 tablet        Take 1 tablet (20 mg) by mouth daily as needed for erectile dysfunction   Refills:  3        tamsulosin 0.4 MG capsule   Commonly known as:  FLOMAX   Quantity:  90 capsule        TAKE 1 CAPSULE AT BEDTIME   Refills:  3                Prescriptions were sent or printed at these locations (1 Prescription)                   Other Prescriptions                Printed at Department/Unit printer (1 of 1)         HYDROcodone-acetaminophen (NORCO) 5-325 MG per tablet                Procedures and tests performed during your visit     Foot XR, G/E 3 views, left      Orders Needing Specimen  Collection     None      Pending Results     No orders found from 2/19/2018 to 2/22/2018.            Pending Culture Results     No orders found from 2/19/2018 to 2/22/2018.            Pending Results Instructions     If you had any lab results that were not finalized at the time of your Discharge, you can call the ED Lab Result RN at [phone]. You will be contacted by this team for any positive Lab results or changes in treatment. The nurses are available 7 days a week from 10A to 6:30P.  You can leave a message 24 hours per day and they will return your call.        Test Results From Your Hospital Stay        2/21/2018  7:37 AM      Narrative     XR FOOT LT G/E 3 VW 2/21/2018 7:28 AM    HISTORY: Redness and pain. Osteomyelitis versus osteoarthritis.    COMPARISON: None.    FINDINGS: Mild osseous degenerative change in the midfoot and at the  first MTP joint. Osseous structures are otherwise within normal  limits. There is no marginal erosion or other specific radiographic  evidence of osteomyelitis.        Impression     IMPRESSION: Osteoarthritis without specific evidence of osteomyelitis.    SHANITA ZAVALETA MD                Clinical Quality Measure: Blood Pressure Screening     Your blood pressure was checked while you were in the emergency department today. The last reading we obtained was  BP: 136/61 . Please read the guidelines below about what these numbers mean and what you should do about them.  If your systolic blood pressure (the top number) is less than 120 and your diastolic blood pressure (the bottom number) is less than 80, then your blood pressure is normal. There is nothing more that you need to do about it.  If your systolic blood pressure (the top number) is 120-139 or your diastolic blood pressure (the bottom number) is 80-89, your blood pressure may be higher than it should be. You should have your blood pressure rechecked within a year by a primary care provider.  If your systolic blood  "pressure (the top number) is 140 or greater or your diastolic blood pressure (the bottom number) is 90 or greater, you may have high blood pressure. High blood pressure is treatable, but if left untreated over time it can put you at risk for heart attack, stroke, or kidney failure. You should have your blood pressure rechecked by a primary care provider within the next 4 weeks.  If your provider in the emergency department today gave you specific instructions to follow-up with your doctor or provider even sooner than that, you should follow that instruction and not wait for up to 4 weeks for your follow-up visit.        Thank you for choosing [facility]       Thank you for choosing [facility] for your care. Our goal is always to provide you with excellent care. Hearing back from our patients is one way we can continue to improve our services. Please take a few minutes to complete the written survey that you may receive in the mail after you visit with us. Thank you!        [employer] Information     [employer] lets you send messages to your doctor, view your test results, renew your prescriptions, schedule appointments and more. To sign up, go to [email or web address] . Click on \"Log in\" on the left side of the screen, which will take you to the Welcome page. Then click on \"Sign up Now\" on the right side of the page.     You will be asked to enter the access code listed below, as well as some personal information. Please follow the directions to create your username and password.     Your access code is: [email or web address]  Expires: 2018  8:05 AM     Your access code will  in 90 days. If you need help or a new code, please call your [place] clinic or [phone].        Care EveryWhere ID     This is your Care EveryWhere ID. This could be used by other organizations to access your [place] [facility] medical records  [id number]        Equal Access to Services     LISA TINAJERO : elena Dunn, " leo lee ah. So [facility] [phone].    ATENCIÓN: Si habla theodoraañol, tiene a sebastian disposición servicios gratuitos de asistencia lingüística. Llame al [phone].    We comply with applicable federal civil rights laws and [place] laws. We do not discriminate on the basis of race, color, national origin, age, disability, sex, sexual orientation, or gender identity.            After Visit Summary       This is your record. Keep this with you and show to your community pharmacist(s) and doctor(s) at your next visit.

## 2018-05-03 ENCOUNTER — OFFICE VISIT (OUTPATIENT)
Dept: FAMILY MEDICINE | Facility: CLINIC | Age: 78
End: 2018-05-03
Payer: MEDICARE

## 2018-05-03 VITALS
HEIGHT: 69 IN | TEMPERATURE: 97.8 F | HEART RATE: 83 BPM | OXYGEN SATURATION: 96 % | WEIGHT: 285 LBS | SYSTOLIC BLOOD PRESSURE: 154 MMHG | DIASTOLIC BLOOD PRESSURE: 100 MMHG | BODY MASS INDEX: 42.21 KG/M2

## 2018-05-03 DIAGNOSIS — E11.22 TYPE 2 DIABETES MELLITUS WITH STAGE 3 CHRONIC KIDNEY DISEASE, WITHOUT LONG-TERM CURRENT USE OF INSULIN (H): Primary | ICD-10-CM

## 2018-05-03 DIAGNOSIS — N18.30 TYPE 2 DIABETES MELLITUS WITH STAGE 3 CHRONIC KIDNEY DISEASE, WITHOUT LONG-TERM CURRENT USE OF INSULIN (H): Primary | ICD-10-CM

## 2018-05-03 DIAGNOSIS — R39.89 URINARY PROBLEM: ICD-10-CM

## 2018-05-03 DIAGNOSIS — N39.0 URINARY TRACT INFECTION WITHOUT HEMATURIA, SITE UNSPECIFIED: ICD-10-CM

## 2018-05-03 DIAGNOSIS — N40.0 BENIGN PROSTATIC HYPERPLASIA, PRESENCE OF LOWER URINARY TRACT SYMPTOMS UNSPECIFIED: ICD-10-CM

## 2018-05-03 DIAGNOSIS — I10 ESSENTIAL HYPERTENSION: ICD-10-CM

## 2018-05-03 LAB
ALBUMIN UR-MCNC: 30 MG/DL
ANION GAP SERPL CALCULATED.3IONS-SCNC: 7 MMOL/L (ref 3–14)
APPEARANCE UR: CLEAR
BILIRUB UR QL STRIP: NEGATIVE
BUN SERPL-MCNC: 42 MG/DL (ref 7–30)
CALCIUM SERPL-MCNC: 9.6 MG/DL (ref 8.5–10.1)
CHLORIDE SERPL-SCNC: 109 MMOL/L (ref 94–109)
CO2 SERPL-SCNC: 27 MMOL/L (ref 20–32)
COLOR UR AUTO: YELLOW
CREAT SERPL-MCNC: 2.18 MG/DL (ref 0.66–1.25)
CREAT UR-MCNC: 57 MG/DL
GFR SERPL CREATININE-BSD FRML MDRD: 29 ML/MIN/1.7M2
GLUCOSE SERPL-MCNC: 128 MG/DL (ref 70–99)
GLUCOSE UR STRIP-MCNC: NEGATIVE MG/DL
HBA1C MFR BLD: 6.3 % (ref 0–5.6)
HGB UR QL STRIP: ABNORMAL
KETONES UR STRIP-MCNC: NEGATIVE MG/DL
LDLC SERPL DIRECT ASSAY-MCNC: 99 MG/DL
LEUKOCYTE ESTERASE UR QL STRIP: NEGATIVE
MICROALBUMIN UR-MCNC: 178 MG/L
MICROALBUMIN/CREAT UR: 311.19 MG/G CR (ref 0–17)
MUCOUS THREADS #/AREA URNS LPF: PRESENT /LPF
NITRATE UR QL: NEGATIVE
NON-SQ EPI CELLS #/AREA URNS LPF: ABNORMAL /LPF
PH UR STRIP: 6.5 PH (ref 5–7)
POTASSIUM SERPL-SCNC: 4.8 MMOL/L (ref 3.4–5.3)
RBC #/AREA URNS AUTO: ABNORMAL /HPF
SODIUM SERPL-SCNC: 143 MMOL/L (ref 133–144)
SOURCE: ABNORMAL
SP GR UR STRIP: 1.01 (ref 1–1.03)
UROBILINOGEN UR STRIP-ACNC: 0.2 EU/DL (ref 0.2–1)
WBC #/AREA URNS AUTO: ABNORMAL /HPF

## 2018-05-03 PROCEDURE — 36415 COLL VENOUS BLD VENIPUNCTURE: CPT | Performed by: INTERNAL MEDICINE

## 2018-05-03 PROCEDURE — 82043 UR ALBUMIN QUANTITATIVE: CPT | Performed by: INTERNAL MEDICINE

## 2018-05-03 PROCEDURE — 99214 OFFICE O/P EST MOD 30 MIN: CPT | Performed by: INTERNAL MEDICINE

## 2018-05-03 PROCEDURE — 83036 HEMOGLOBIN GLYCOSYLATED A1C: CPT | Performed by: INTERNAL MEDICINE

## 2018-05-03 PROCEDURE — 83721 ASSAY OF BLOOD LIPOPROTEIN: CPT | Performed by: INTERNAL MEDICINE

## 2018-05-03 PROCEDURE — 81001 URINALYSIS AUTO W/SCOPE: CPT | Performed by: INTERNAL MEDICINE

## 2018-05-03 PROCEDURE — 80048 BASIC METABOLIC PNL TOTAL CA: CPT | Performed by: INTERNAL MEDICINE

## 2018-05-03 RX ORDER — TAMSULOSIN HYDROCHLORIDE 0.4 MG/1
0.8 CAPSULE ORAL AT BEDTIME
Qty: 180 CAPSULE | Refills: 1 | Status: SHIPPED | OUTPATIENT
Start: 2018-05-03 | End: 2018-12-21

## 2018-05-03 RX ORDER — CIPROFLOXACIN 500 MG/1
500 TABLET, FILM COATED ORAL 2 TIMES DAILY
Qty: 20 TABLET | Refills: 0 | Status: SHIPPED | OUTPATIENT
Start: 2018-05-03 | End: 2018-05-13

## 2018-05-03 RX ORDER — SULFAMETHOXAZOLE/TRIMETHOPRIM 800-160 MG
1 TABLET ORAL 2 TIMES DAILY
Qty: 10 TABLET | Refills: 0 | Status: CANCELLED | OUTPATIENT
Start: 2018-05-03 | End: 2018-05-08

## 2018-05-03 RX ORDER — HYDROCHLOROTHIAZIDE 25 MG/1
25 TABLET ORAL DAILY
Qty: 30 TABLET | Refills: 1 | Status: SHIPPED | OUTPATIENT
Start: 2018-05-03 | End: 2018-05-18

## 2018-05-03 ASSESSMENT — ENCOUNTER SYMPTOMS
POLYDIPSIA: 0
DYSURIA: 0
HEMATURIA: 0
TINGLING: 0
FREQUENCY: 1
ABDOMINAL PAIN: 0
PALPITATIONS: 0
HEADACHES: 0
DIZZINESS: 0
SHORTNESS OF BREATH: 0
BLURRED VISION: 0
COUGH: 0
EYE PAIN: 0
SENSORY CHANGE: 0
NAUSEA: 0
WEIGHT LOSS: 0
FOCAL WEAKNESS: 0
CLAUDICATION: 0
DIAPHORESIS: 0
VOMITING: 0
ORTHOPNEA: 0
PND: 0
DIARRHEA: 0

## 2018-05-03 NOTE — MR AVS SNAPSHOT
After Visit Summary   5/3/2018    Ross Caballero    MRN: [id number]           Patient Information     Date Of Birth          1940        Visit Information        Provider Department      5/3/2018 10:00 AM Errol Maldonado MD [facility]        Today's Diagnoses     Type 2 diabetes mellitus with stage 3 chronic kidney disease, without long-term current use of insulin (H)    -  1    Essential hypertension        Urinary tract infection without hematuria, site unspecified        Urinary problem        Benign prostatic hyperplasia, presence of lower urinary tract symptoms unspecified          Care Instructions    Take prescribed antibiotic for your urine infection as directed.    Increase your Flomax to 2 capsules at bedtime.    Call doctor if your urinary symptoms persist/worsens, or if you develop new symptoms or side effects from the medication.    Monitor your blood sugar twice a day (before breakfast and before dinner) and record in a small notebook (always bring this notebook with you during you clinic visits).  Call doctor if your blood sugar readings are not consistently between 100-140, or if they are greater than 200 or less than 80.    Follow up in 1 month.              Follow-ups after your visit        Who to contact     If you have questions or need follow up information about today's clinic visit or your schedule please contact [facility] directly at [phone].  Normal or non-critical lab and imaging results will be communicated to you by MyChart, letter or phone within 4 business days after the clinic has received the results. If you do not hear from us within 7 days, please contact the clinic through MyChart or phone. If you have a critical or abnormal lab result, we will notify you by phone as soon as possible.  Submit refill requests through [employer] or call your pharmacy and they will forward the refill request to us. Please allow 3 business  "days for your refill to be completed.          Additional Information About Your Visit        MyChart Information     Mobile System 7 lets you send messages to your doctor, view your test results, renew your prescriptions, schedule appointments and more. To sign up, go to [email or web address] . Click on \"Log in\" on the left side of the screen, which will take you to the Welcome page. Then click on \"Sign up Now\" on the right side of the page.     You will be asked to enter the access code listed below, as well as some personal information. Please follow the directions to create your username and password.     Your access code is: [email or web address]  Expires: 2018  9:05 AM     Your access code will  in 90 days. If you need help or a new code, please call your [facility] clinic or [phone].        Care EveryWhere ID     This is your Care EveryWhere ID. This could be used by other organizations to access your [facility] medical records  [id number]        Your Vitals Were     Pulse Temperature Height Pulse Oximetry BMI (Body Mass Index)       83 97.8  F (36.6  C) (Oral) 5' 9\" (1.753 m) 96% 42.09 kg/m2        Blood Pressure from Last 3 Encounters:   18 (!) 154/100   18 104/74   08/10/17 124/77    Weight from Last 3 Encounters:   18 285 lb (129.3 kg)   18 283 lb (128.4 kg)   08/10/17 284 lb 11.2 oz (129.1 kg)              We Performed the Following     Albumin Random Urine Quantitative with Creat Ratio     Basic metabolic panel     Hemoglobin A1c     LDL cholesterol direct     UA reflex to Microscopic and Culture     Urine Microscopic          Today's Medication Changes          These changes are accurate as of 5/3/18 10:42 AM.  If you have any questions, ask your nurse or doctor.               Start taking these medicines.        Dose/Directions    ciprofloxacin 500 MG tablet   Commonly known as:  CIPRO   Used for:  Urinary tract infection without hematuria, site unspecified   Started by:  " Errol Maldonado MD        Dose:  500 mg   Take 1 tablet (500 mg) by mouth 2 times daily for 10 days   Quantity:  20 tablet   Refills:  0       hydrochlorothiazide 25 MG tablet   Commonly known as:  HYDRODIURIL   Used for:  Essential hypertension   Started by:  Errol Maldonado MD        Dose:  25 mg   Take 1 tablet (25 mg) by mouth daily   Quantity:  30 tablet   Refills:  1         These medicines have changed or have updated prescriptions.        Dose/Directions    tamsulosin 0.4 MG capsule   Commonly known as:  FLOMAX   This may have changed:    - how much to take  - how to take this  - when to take this  - additional instructions   Used for:  Benign prostatic hyperplasia, presence of lower urinary tract symptoms unspecified   Changed by:  Errol Maldonado MD        Dose:  0.8 mg   Take 2 capsules (0.8 mg) by mouth At Bedtime   Quantity:  180 capsule   Refills:  1            Where to get your medicines      These medications were sent to [facility] [address] AT [address] to [address] [address]     Phone:  [phone]     hydrochlorothiazide 25 MG tablet    tamsulosin 0.4 MG capsule         These medications were sent to [facility]/pharmacy #[id number] - [facility] [address] [address]     Phone:  [phone]     ciprofloxacin 500 MG tablet                Primary Care Provider Office Phone # Fax #    Errol Maldonado [phone] [address] [address]        Equal Access to Services     [facility]: Hadii aad ku hadasho Soomaali, waaxda luqadaha, qaybta kaalmada adeegyada, leo espinoza. So [facility] [phone].    ATENCIÓN: Si habla español, tiene a sebastian disposición servicios gratuitos de asistencia lingüística. Llame al [phone].    We comply with applicable federal  civil rights laws and [place] laws. We do not discriminate on the basis of race, color, national origin, age, disability, sex, sexual orientation, or gender identity.            Thank you!     Thank you for choosing [facility]  for your care. Our goal is always to provide you with excellent care. Hearing back from our patients is one way we can continue to improve our services. Please take a few minutes to complete the written survey that you may receive in the mail after your visit with us. Thank you!             Your Updated Medication List - Protect others around you: Learn how to safely use, store and throw away your medicines at www.disposemymeds.org.          This list is accurate as of 5/3/18 10:42 AM.  Always use your most recent med list.                   Brand Name Dispense Instructions for use Diagnosis    blood glucose monitoring test strip    RELION ULTIMA TEST    1 Box    Use to test blood sugar 1 times daily or as directed.  Ok to substitute alternative if insurance prefers.    Type 2 diabetes mellitus with stage 3 chronic kidney disease, without long-term current use of insulin (H)       ciprofloxacin 500 MG tablet    CIPRO    20 tablet    Take 1 tablet (500 mg) by mouth 2 times daily for 10 days    Urinary tract infection without hematuria, site unspecified       gabapentin 100 MG capsule    NEURONTIN    90 capsule    Take 1 capsule (100 mg) by mouth 2 times daily    Neuropathy       hydrochlorothiazide 25 MG tablet    HYDRODIURIL    30 tablet    Take 1 tablet (25 mg) by mouth daily    Essential hypertension       HYDROcodone-acetaminophen 5-325 MG per tablet    NORCO    8 tablet    Take 1-2 tablets by mouth every 6 hours as needed for moderate to severe pain        metFORMIN 500 MG 24 hr tablet    GLUCOPHAGE-XR    180 tablet    TAKE TWO TABLETS BY MOUTH ONCE DAILY WITH DINNER    Type 2 diabetes mellitus with stage 3 chronic kidney disease, without long-term current use of insulin (H)        multivitamin, therapeutic Tabs tablet      Take 1 tablet by mouth daily        olmesartan 40 MG tablet    BENICAR    90 tablet    Take 1 tablet (40 mg) by mouth daily    Essential hypertension       RELION [employer] LabsA GLUCOSE SYSTEM w/Device Kit     1 kit    1 kit daily Use to test blood sugars 1 times daily or as directed.  Ok to substitute alternative if insurance prefers.    Type 2 diabetes mellitus with other specified complication (H)       RELION ULTRA THIN PLUS LANCETS Misc     100 each    1 lancet daily Use to test blood sugars 1 times daily or as directed.  Ok to substitute alternative if insurance prefers.    Type 2 diabetes mellitus with stage 3 chronic kidney disease, without long-term current use of insulin (H)       tadalafil 20 MG tablet    CIALIS    10 tablet    Take 1 tablet (20 mg) by mouth daily as needed for erectile dysfunction    Erectile dysfunction, unspecified erectile dysfunction type       tamsulosin 0.4 MG capsule    FLOMAX    180 capsule    Take 2 capsules (0.8 mg) by mouth At Bedtime    Benign prostatic hyperplasia, presence of lower urinary tract symptoms unspecified

## 2018-05-03 NOTE — LETTER
[facility]  [address] [facility]  [address]  [place]  [address]  Tel: [phone]    May 4, 2018    Ross Caballero  [address] [facility] [address]        Dear Mr. Caballero,    Good day to you Mr. Caballero.     Your hemoglobin A1c (diabetes control) and LDL cholesterol are excellent.  Keep up the good work.     Your urine function test (creatinine) appears to have worsened.  We will recheck this when you come in for your follow-up visit at the clinic in 1 month.     Your urine test shows that your diabetes has caused kidney damage at this time, which is another reason why we need to keep your diabetes controlled to maintain your kidney healthy.  On your next visit in 1 month, we also need to talk about starting you on a medication that will help protect your kidney against diabetes.     Please call if you have questions or concerns.      If you have any further questions or problems, please contact our office.      Sincerely,    Errol Maldonado MD/rachael          Enclosure: Lab Results                      Results for orders placed or performed in visit on 05/03/18   Basic metabolic panel   Result Value Ref Range    Sodium 143 133 - 144 mmol/L    Potassium 4.8 3.4 - 5.3 mmol/L    Chloride 109 94 - 109 mmol/L    Carbon Dioxide 27 20 - 32 mmol/L    Anion Gap 7 3 - 14 mmol/L    Glucose 128 (H) 70 - 99 mg/dL    Urea Nitrogen 42 (H) 7 - 30 mg/dL    Creatinine 2.18 (H) 0.66 - 1.25 mg/dL    GFR Estimate 29 (L) >60 mL/min/1.7m2    GFR Estimate If Black 36 (L) >60 mL/min/1.7m2    Calcium 9.6 8.5 - 10.1 mg/dL   Albumin Random Urine Quantitative with Creat Ratio   Result Value Ref Range    Creatinine Urine 57 mg/dL    Albumin Urine mg/L 178 mg/L    Albumin Urine mg/g Cr 311.19 (H) 0 - 17 mg/g Cr   Hemoglobin A1c   Result Value Ref Range    Hemoglobin A1C 6.3 (H) 0 - 5.6 %   LDL cholesterol direct   Result Value Ref Range    LDL Cholesterol Direct 99 <100 mg/dL   UA reflex to Microscopic and Culture   Result Value Ref  Range    Color Urine Yellow     Appearance Urine Clear     Glucose Urine Negative NEG^Negative mg/dL    Bilirubin Urine Negative NEG^Negative    Ketones Urine Negative NEG^Negative mg/dL    Specific Gravity Urine 1.015 1.003 - 1.035    Blood Urine Trace (A) NEG^Negative    pH Urine 6.5 5.0 - 7.0 pH    Protein Albumin Urine 30 (A) NEG^Negative mg/dL    Urobilinogen Urine 0.2 0.2 - 1.0 EU/dL    Nitrite Urine Negative NEG^Negative    Leukocyte Esterase Urine Negative NEG^Negative    Source Midstream Urine    Urine Microscopic   Result Value Ref Range    WBC Urine 5-10 (A) OTO5^0 - 5 /HPF    RBC Urine O - 2 OTO2^O - 2 /HPF    Squamous Epithelial /LPF Urine Few FEW^Few /LPF    Mucous Urine Present (A) NEG^Negative /LPF

## 2018-05-03 NOTE — LETTER
[facility]  [address] [facility]  [address] [place] [address]  [address]  Tel: [phone]    May 4, 2018    Ross Caballero  [address] [facility] [address]        Dear Mr. Caballero,      Your hemoglobin A1c (diabetes control) and LDL cholesterol are excellent.  Keep up the good work.     Your urine function test (creatinine) appears to have worsened.  We will recheck this when you come in for your follow-up visit at the clinic in 1 month.     Your urine test shows that your diabetes has caused kidney damage at this time, which is another reason why we need to keep your diabetes controlled to maintain your kidney healthy.  On your next visit in 1 month, we also need to talk about starting you on a medication that will help protect your kidney against diabetes.       If you have any further questions or problems, please contact our office.      Sincerely,      Errol Maldonado MD/rachael          Enclosure: Lab Results                      Results for orders placed or performed in visit on 05/03/18   Basic metabolic panel   Result Value Ref Range    Sodium 143 133 - 144 mmol/L    Potassium 4.8 3.4 - 5.3 mmol/L    Chloride 109 94 - 109 mmol/L    Carbon Dioxide 27 20 - 32 mmol/L    Anion Gap 7 3 - 14 mmol/L    Glucose 128 (H) 70 - 99 mg/dL    Urea Nitrogen 42 (H) 7 - 30 mg/dL    Creatinine 2.18 (H) 0.66 - 1.25 mg/dL    GFR Estimate 29 (L) >60 mL/min/1.7m2    GFR Estimate If Black 36 (L) >60 mL/min/1.7m2    Calcium 9.6 8.5 - 10.1 mg/dL   Albumin Random Urine Quantitative with Creat Ratio   Result Value Ref Range    Creatinine Urine 57 mg/dL    Albumin Urine mg/L 178 mg/L    Albumin Urine mg/g Cr 311.19 (H) 0 - 17 mg/g Cr   Hemoglobin A1c   Result Value Ref Range    Hemoglobin A1C 6.3 (H) 0 - 5.6 %   LDL cholesterol direct   Result Value Ref Range    LDL Cholesterol Direct 99 <100 mg/dL   UA reflex to Microscopic and Culture   Result Value Ref Range    Color Urine Yellow     Appearance Urine Clear     Glucose Urine  Negative NEG^Negative mg/dL    Bilirubin Urine Negative NEG^Negative    Ketones Urine Negative NEG^Negative mg/dL    Specific Gravity Urine 1.015 1.003 - 1.035    Blood Urine Trace (A) NEG^Negative    pH Urine 6.5 5.0 - 7.0 pH    Protein Albumin Urine 30 (A) NEG^Negative mg/dL    Urobilinogen Urine 0.2 0.2 - 1.0 EU/dL    Nitrite Urine Negative NEG^Negative    Leukocyte Esterase Urine Negative NEG^Negative    Source Midstream Urine    Urine Microscopic   Result Value Ref Range    WBC Urine 5-10 (A) OTO5^0 - 5 /HPF    RBC Urine O - 2 OTO2^O - 2 /HPF    Squamous Epithelial /LPF Urine Few FEW^Few /LPF    Mucous Urine Present (A) NEG^Negative /LPF

## 2018-05-03 NOTE — PROGRESS NOTES
"  SUBJECTIVE:   Ross Caballero is a 77 year old male who presents to clinic today for the following health issues:      Diabetes Follow-up      Patient is checking blood sugars: { :262870}    Diabetic concerns: {Diabetic Concerns:768196::\"None\"}     Symptoms of hypoglycemia (low blood sugar): { :162944::\"none\"}     Paresthesias (numbness or burning in feet) or sores: { :843451::\"No\"}     Date of last diabetic eye exam: ***    BP Readings from Last 2 Encounters:   02/21/18 104/74   08/10/17 124/77     Hemoglobin A1C (%)   Date Value   08/10/2017 6.2 (H)   02/03/2017 6.3 (H)     LDL Cholesterol Calculated (mg/dL)   Date Value   04/10/2014 104     LDL Cholesterol Direct (mg/dL)   Date Value   08/10/2017 103 (H)   02/03/2015 106       Amount of exercise or physical activity: {Exercise frequency days per week:616047}    Problems taking medications regularly: {Med Problems:834237::\"No\"}    Medication side effects: {CHRONIC MED SIDE EFFECTS:295673::\"none\"}    Diet: { :873595}        {additional problems for provider to add:462893}    Problem list and histories reviewed & adjusted, as indicated.  Additional history: {NONE - AS DOCUMENTED:009002::\"as documented\"}    {HIST REVIEW/ LINKS 2:692819}    Reviewed and updated as needed this visit by clinical staff  Allergies  Meds       Reviewed and updated as needed this visit by Provider         {PROVIDER CHARTING PREFERENCE:368133}  "

## 2018-05-03 NOTE — PATIENT INSTRUCTIONS
Take prescribed antibiotic for your urine infection as directed.    Increase your Flomax to 2 capsules at bedtime.    Call doctor if your urinary symptoms persist/worsens, or if you develop new symptoms or side effects from the medication.    Monitor your blood sugar twice a day (before breakfast and before dinner) and record in a small notebook (always bring this notebook with you during you clinic visits).  Call doctor if your blood sugar readings are not consistently between 100-140, or if they are greater than 200 or less than 80.    Follow up in 1 month.

## 2018-05-03 NOTE — PROGRESS NOTES
HPI    SUBJECTIVE:   Ross Caballero is a 77 year old male who presents to clinic today for the following health issues:      Diabetes Follow-up      Patient is checking blood sugars: 3 times a week    Diabetic concerns: None     Symptoms of hypoglycemia (low blood sugar): none     Paresthesias (numbness or burning in feet) or sores: Yes burning sometimes     Date of last diabetic eye exam: having one 5/4/18    BP Readings from Last 2 Encounters:   05/03/18 (!) 154/100   02/21/18 104/74     Hemoglobin A1C (%)   Date Value   05/03/2018 6.3 (H)   08/10/2017 6.2 (H)     LDL Cholesterol Calculated (mg/dL)   Date Value   04/10/2014 104     LDL Cholesterol Direct (mg/dL)   Date Value   05/03/2018 99   08/10/2017 103 (H)       Hypertension Follow-up      Outpatient blood pressures are not being checked.    Low Salt Diet: no added salt      Amount of exercise or physical activity: yardwork    Problems taking medications regularly: No    Medication side effects: none    Diet: regular (no restrictions)      Patient has a history of BPH and is currently on Flomax.  He complains of nocturia which disturbs his sleep.    Has also been experiencing urinary incontinence.  Denies dysuria or hematuria.      Past Medical History:   Diagnosis Date     Benign essential hypertension      BPH (benign prostatic hyperplasia)     flomax      Calculus of kidney 2002    with lithrotripsy      Diabetes mellitus (H)      Melanoma (H)      Osteoarthritis, knee     has had synvisc type shot      Osteomyelitis (H)     toe amputation       Review of Systems   Constitutional: Negative for diaphoresis, malaise/fatigue and weight loss.   Eyes: Negative for blurred vision and pain.   Respiratory: Negative for cough and shortness of breath.    Cardiovascular: Negative for chest pain, palpitations, orthopnea, claudication, leg swelling and PND.   Gastrointestinal: Negative for abdominal pain, diarrhea, nausea and vomiting.   Genitourinary: Positive for  "frequency. Negative for dysuria, hematuria and urgency.   Neurological: Negative for dizziness, tingling, sensory change, focal weakness and headaches.   Endo/Heme/Allergies: Negative for polydipsia.       BP (!) 154/100 (BP Location: Left arm, Cuff Size: Adult Large)  Pulse 83  Temp 97.8  F (36.6  C) (Oral)  Ht 5' 9\" (1.753 m)  Wt 285 lb (129.3 kg)  SpO2 96%  BMI 42.09 kg/m2      Physical Exam   Constitutional: He is oriented to person, place, and time. No distress.   Neck: No thyromegaly present.   Cardiovascular: Normal rate, regular rhythm and normal heart sounds.    Pulmonary/Chest: Effort normal and breath sounds normal. No respiratory distress.   Abdominal: Soft. There is no tenderness.   Musculoskeletal: He exhibits no edema.   Neurological: He is alert and oriented to person, place, and time. Coordination normal. GCS score is 15.   Nursing note and vitals reviewed.        ICD-10-CM    1. Type 2 diabetes mellitus with stage 3 chronic kidney disease, without long-term current use of insulin (H) E11.22 Basic metabolic panel    N18.3 Albumin Random Urine Quantitative with Creat Ratio     Hemoglobin A1c     LDL cholesterol direct   2. Essential hypertension I10 hydrochlorothiazide (HYDRODIURIL) 25 MG tablet   3. Benign prostatic hyperplasia, presence of lower urinary tract symptoms unspecified N40.0 tamsulosin (FLOMAX) 0.4 MG capsule   4. Urinary tract infection without hematuria, site unspecified N39.0 ciprofloxacin (CIPRO) 500 MG tablet   5. Urinary problem R39.89 UA reflex to Microscopic and Culture     Urine Microscopic     ? if patient's urinary incontinence is from overflow incontinence due to urinary retention from BPH; if BPH symptoms are relieved but patient still has incontinence then we will consider bladder therapy      Patient Instructions   Take prescribed antibiotic for your urine infection as directed.    Increase your Flomax to 2 capsules at bedtime.    Call doctor if your urinary symptoms " persist/worsens, or if you develop new symptoms or side effects from the medication.    Monitor your blood sugar twice a day (before breakfast and before dinner) and record in a small notebook (always bring this notebook with you during you clinic visits).  Call doctor if your blood sugar readings are not consistently between 100-140, or if they are greater than 200 or less than 80.    Follow up in 1 month.

## 2018-05-04 ENCOUNTER — TRANSFERRED RECORDS (OUTPATIENT)
Dept: HEALTH INFORMATION MANAGEMENT | Facility: CLINIC | Age: 78
End: 2018-05-04

## 2018-05-09 ENCOUNTER — TRANSFERRED RECORDS (OUTPATIENT)
Dept: HEALTH INFORMATION MANAGEMENT | Facility: CLINIC | Age: 78
End: 2018-05-09

## 2018-05-10 LAB
GLUCOSE SERPL-MCNC: 116 MG/DL (ref 65–100)
INR PPP: 1.1

## 2018-05-13 LAB
CREAT SERPL-MCNC: 1.95 MG/DL (ref 0.72–1.25)
GFR SERPL CREATININE-BSD FRML MDRD: 33 ML/MIN/1.73M2
POTASSIUM SERPL-SCNC: 4.4 MMOL/L (ref 3.5–5)

## 2018-05-18 ENCOUNTER — OFFICE VISIT (OUTPATIENT)
Dept: FAMILY MEDICINE | Facility: CLINIC | Age: 78
End: 2018-05-18
Payer: MEDICARE

## 2018-05-18 VITALS
TEMPERATURE: 97 F | RESPIRATION RATE: 18 BRPM | HEIGHT: 69 IN | SYSTOLIC BLOOD PRESSURE: 117 MMHG | DIASTOLIC BLOOD PRESSURE: 73 MMHG | HEART RATE: 84 BPM | WEIGHT: 275 LBS | BODY MASS INDEX: 40.73 KG/M2

## 2018-05-18 DIAGNOSIS — C61 PROSTATE CANCER (H): Primary | ICD-10-CM

## 2018-05-18 DIAGNOSIS — N13.9 OBSTRUCTIVE UROPATHY: ICD-10-CM

## 2018-05-18 DIAGNOSIS — I10 ESSENTIAL HYPERTENSION: ICD-10-CM

## 2018-05-18 PROCEDURE — 99213 OFFICE O/P EST LOW 20 MIN: CPT | Performed by: INTERNAL MEDICINE

## 2018-05-18 RX ORDER — BICALUTAMIDE 50 MG/1
50 TABLET, FILM COATED ORAL
COMMUNITY
Start: 2018-05-13 | End: 2018-09-14

## 2018-05-18 NOTE — PROGRESS NOTES
HPI      SUBJECTIVE:   Ross Caballero is a 78 year old male who presents to clinic today for the following health issues:      Hospital Follow-up Visit:    Hospital/Nursing Home/ Rehab Facility: [facility]  Date of Admission: 5/9/2018  Date of Discharge: 5/13/2018  Reason(s) for Admission: Lack of Urination            Problems taking medications regularly:  None       Medication changes since discharge: See updated Med List       Problems adhering to non-medication therapy:  None    Summary of hospitalization:  CareEverywhere information obtained and reviewed  Diagnostic Tests/Treatments reviewed.  Follow up needed: none  Other Healthcare Providers Involved in Patient s Care:         Oncology, Urology  Update since discharge: improved.     Post Discharge Medication Reconciliation: discharge medications reconciled, continue medications without change.  Plan of care communicated with patient     Coding guidelines for this visit:  Type of Medical   Decision Making Face-to-Face Visit       within 7 Days of discharge Face-to-Face Visit        within 14 days of discharge   Moderate Complexity [id number]   High Complexity 70196 [id number]            Patient was recently admitted at [facility] due to anuria secondary to obstructive uropathy from prostate cancer.  Oncology, urology, and nephrology were consulted.  Patient was discharged in stable condition with a Oro catheter with urine collection back.    Since the patient's discharge from the hospital, he is doing fairly well.  Urine in the collection bag is clear and yellow.  Denies urinary symptoms.  No gross hematuria observed.  No lower abdominal pain.      Past Medical History:   Diagnosis Date     Benign essential hypertension      BPH (benign prostatic hyperplasia)     flomax      Calculus of kidney 2002    with lithrotripsy      Diabetes mellitus (H)      Melanoma (H)      Osteoarthritis, knee     has had synvisc type shot      Osteomyelitis (H)     toe  "amputation       Review of Systems   Constitutional: Negative for chills, fever and malaise/fatigue.   Respiratory: Negative for cough and shortness of breath.    Cardiovascular: Negative for chest pain and palpitations.   Gastrointestinal: Negative for abdominal pain, blood in stool, constipation, diarrhea, melena, nausea and vomiting.   Genitourinary: Negative for dysuria, flank pain and hematuria.   Skin: Negative for rash.   Neurological: Negative for dizziness.   Psychiatric/Behavioral: Negative for depression. The patient is nervous/anxious (mild -- worried about the cancer). The patient does not have insomnia.        /73 (BP Location: Left arm, Patient Position: Chair, Cuff Size: Adult Large)  Pulse 84  Temp 97  F (36.1  C) (Oral)  Resp 18  Ht 5' 9\" (1.753 m)  Wt 275 lb (124.7 kg)  BMI 40.61 kg/m2      Physical Exam   Constitutional: He is oriented to person, place, and time. No distress.   Abdominal: Soft. There is no tenderness.   Genitourinary:   Genitourinary Comments: Oro catheter in place with clear yellowish urine in the collection bag   Neurological: He is alert and oriented to person, place, and time. GCS score is 15.   Skin: No rash noted.   Vitals reviewed.        ICD-10-CM    1. Prostate cancer (H) C61    2. Obstructive uropathy N13.9 NEPHROLOGY ADULT REFERRAL   3. Essential hypertension I10 Blood Pressure Monitoring KIT       Patient Instructions   Monitor your blood sugar twice a day (before breakfast and before dinner) and record in a small notebook (always bring this notebook with you during you clinic visits).  Call doctor if your blood sugar readings are not consistently between 100-140, or if they are greater than 200 or less than 80.    Monitor your blood pressure twice a day (once you wake up and before bedtime).  Call doctor if:  -- your blood pressure for the top/upper number is greater than 140 or less than 90  -- your blood pressure for the bottom/lower number is greater " than 90 or less than 60    Follow up in 3 months.

## 2018-05-18 NOTE — PATIENT INSTRUCTIONS
Monitor your blood sugar twice a day (before breakfast and before dinner) and record in a small notebook (always bring this notebook with you during you clinic visits).  Call doctor if your blood sugar readings are not consistently between 100-140, or if they are greater than 200 or less than 80.    Monitor your blood pressure twice a day (once you wake up and before bedtime).  Call doctor if:  -- your blood pressure for the top/upper number is greater than 140 or less than 90  -- your blood pressure for the bottom/lower number is greater than 90 or less than 60    Follow up in 3 months.

## 2018-05-18 NOTE — MR AVS SNAPSHOT
After Visit Summary   5/18/2018    Ross Caballero    MRN: [id number]           Patient Information     Date Of Birth          1940        Visit Information        Provider Department      5/18/2018 2:00 PM Errol Maldonado MD [facility]        Today's Diagnoses     Prostate cancer (H)    -  1    Type 2 diabetes mellitus with stage 3 chronic kidney disease, without long-term current use of insulin (H)        Essential hypertension        Obstructive uropathy          Care Instructions    Monitor your blood sugar twice a day (before breakfast and before dinner) and record in a small notebook (always bring this notebook with you during you clinic visits).  Call doctor if your blood sugar readings are not consistently between 100-140, or if they are greater than 200 or less than 80.    Monitor your blood pressure twice a day (once you wake up and before bedtime).  Call doctor if:  -- your blood pressure for the top/upper number is greater than 140 or less than 90  -- your blood pressure for the bottom/lower number is greater than 90 or less than 60    Follow up in 3 months.          Follow-ups after your visit        Additional Services     NEPHROLOGY ADULT REFERRAL       Your provider has referred you to: [facility] [phone]   [email or web address]    Please be aware that coverage of these services is subject to the terms and limitations of your health insurance plan.  Call member services at your health plan with any benefit or coverage questions.      Reason for referral:  obstructive uropathy    Please bring the following to your appointment:    >>   Any x-rays, CTs or MRIs which have been performed.  Contact the facility where they were done to arrange for  prior to your scheduled appointment.   >>   List of current medications   >>   This referral request   >>   Any documents/labs given to you for this referral             "      Who to contact     If you have questions or need follow up information about today's clinic visit or your schedule please contact [facility] directly at [phone].  Normal or non-critical lab and imaging results will be communicated to you by MyChart, letter or phone within 4 business days after the clinic has received the results. If you do not hear from us within 7 days, please contact the clinic through MyChart or phone. If you have a critical or abnormal lab result, we will notify you by phone as soon as possible.  Submit refill requests through [employer] or call your pharmacy and they will forward the refill request to us. Please allow 3 business days for your refill to be completed.          Additional Information About Your Visit        [employer]DataInsight[employer]ar[employer] Information     [employer] lets you send messages to your doctor, view your test results, renew your prescriptions, schedule appointments and more. To sign up, go to [email or web address] . Click on \"Log in\" on the left side of the screen, which will take you to the Welcome page. Then click on \"Sign up Now\" on the right side of the page.     You will be asked to enter the access code listed below, as well as some personal information. Please follow the directions to create your username and password.     Your access code is: [email or web address]  Expires: 2018  9:05 AM     Your access code will  in 90 days. If you need help or a new code, please call your [facility] clinic or [phone].        Care EveryWhere ID     This is your Care EveryWhere ID. This could be used by other organizations to access your [facility] medical records  [id number]        Your Vitals Were     Pulse Temperature Respirations Height BMI (Body Mass Index)       84 97  F (36.1  C) (Oral) 18 5' 9\" (1.753 m) 40.61 kg/m2        Blood Pressure from Last 3 Encounters:   18 117/73   18 (!) 154/100   18 104/74    Weight from Last 3 Encounters:   18 275 lb " (124.7 kg)   05/03/18 285 lb (129.3 kg)   02/21/18 283 lb (128.4 kg)              We Performed the Following     Basic metabolic panel     NEPHROLOGY ADULT REFERRAL          Today's Medication Changes          These changes are accurate as of 5/18/18  2:26 PM.  If you have any questions, ask your nurse or doctor.               Start taking these medicines.        Dose/Directions    Blood Pressure Monitoring Kit   Used for:  Essential hypertension   Started by:  Errol Maldonado MD        Use as directed   Quantity:  1 kit   Refills:  0         Stop taking these medicines if you haven't already. Please contact your care team if you have questions.     multivitamin, therapeutic Tabs tablet   Stopped by:  Errol Maldonado MD                Where to get your medicines      Some of these will need a paper prescription and others can be bought over the counter.  Ask your nurse if you have questions.     Bring a paper prescription for each of these medications     Blood Pressure Monitoring Kit                Primary Care Provider Office Phone # Fax #    Errol Maldonado [phone] [address] [address]        Equal Access to Services     [facility] AH: Hadii aad ku hadasho Sogroverali, waaxda luqadaha, qaybta kaalmada adewandayada, leo gates . So [facility] [phone].    ATENCIÓN: Si habla español, tiene a sebastian disposición servicios gratuitos de asistencia lingüística. Llame al [phone].    We comply with applicable federal civil rights laws and [place] laws. We do not discriminate on the basis of race, color, national origin, age, disability, sex, sexual orientation, or gender identity.            Thank you!     Thank you for choosing [facility]  for your care. Our goal is always to provide you with excellent care. Hearing back from our patients is one way we can continue to improve our  services. Please take a few minutes to complete the written survey that you may receive in the mail after your visit with us. Thank you!             Your Updated Medication List - Protect others around you: Learn how to safely use, store and throw away your medicines at www.disposemymeds.org.          This list is accurate as of 5/18/18  2:26 PM.  Always use your most recent med list.                   Brand Name Dispense Instructions for use Diagnosis    bicalutamide 50 MG tablet    CASODEX     Take 50 mg by mouth        blood glucose monitoring test strip    RELION ULTIMA TEST    1 Box    Use to test blood sugar 1 times daily or as directed.  Ok to substitute alternative if insurance prefers.    Type 2 diabetes mellitus with stage 3 chronic kidney disease, without long-term current use of insulin (H)       Blood Pressure Monitoring Kit     1 kit    Use as directed    Essential hypertension       HYDROcodone-acetaminophen 5-325 MG per tablet    NORCO    8 tablet    Take 1-2 tablets by mouth every 6 hours as needed for moderate to severe pain        RELION ULTIMA GLUCOSE SYSTEM w/Device Kit     1 kit    1 kit daily Use to test blood sugars 1 times daily or as directed.  Ok to substitute alternative if insurance prefers.    Type 2 diabetes mellitus with other specified complication (H)       RELION ULTRA THIN PLUS LANCETS Misc     100 each    1 lancet daily Use to test blood sugars 1 times daily or as directed.  Ok to substitute alternative if insurance prefers.    Type 2 diabetes mellitus with stage 3 chronic kidney disease, without long-term current use of insulin (H)       tadalafil 20 MG tablet    CIALIS    10 tablet    Take 1 tablet (20 mg) by mouth daily as needed for erectile dysfunction    Erectile dysfunction, unspecified erectile dysfunction type       tamsulosin 0.4 MG capsule    FLOMAX    180 capsule    Take 2 capsules (0.8 mg) by mouth At Bedtime    Benign prostatic hyperplasia, presence of lower urinary  tract symptoms unspecified

## 2018-05-20 ASSESSMENT — ENCOUNTER SYMPTOMS
NAUSEA: 0
DIARRHEA: 0
ABDOMINAL PAIN: 0
PALPITATIONS: 0
INSOMNIA: 0
FLANK PAIN: 0
HEMATURIA: 0
CONSTIPATION: 0
NERVOUS/ANXIOUS: 1
FEVER: 0
CHILLS: 0
BLOOD IN STOOL: 0
VOMITING: 0
DYSURIA: 0
DEPRESSION: 0
DIZZINESS: 0
SHORTNESS OF BREATH: 0
COUGH: 0

## 2018-05-31 ENCOUNTER — TRANSFERRED RECORDS (OUTPATIENT)
Dept: HEALTH INFORMATION MANAGEMENT | Facility: CLINIC | Age: 78
End: 2018-05-31

## 2018-07-31 ENCOUNTER — APPOINTMENT (OUTPATIENT)
Age: 78
Setting detail: DERMATOLOGY
End: 2018-08-02

## 2018-07-31 DIAGNOSIS — Z85.820 PERSONAL HISTORY OF MALIGNANT MELANOMA OF SKIN: ICD-10-CM

## 2018-09-14 ENCOUNTER — OFFICE VISIT (OUTPATIENT)
Dept: FAMILY MEDICINE | Facility: CLINIC | Age: 78
End: 2018-09-14
Payer: MEDICARE

## 2018-09-14 ENCOUNTER — TELEPHONE (OUTPATIENT)
Dept: FAMILY MEDICINE | Facility: CLINIC | Age: 78
End: 2018-09-14

## 2018-09-14 VITALS
HEIGHT: 69 IN | HEART RATE: 107 BPM | DIASTOLIC BLOOD PRESSURE: 68 MMHG | OXYGEN SATURATION: 95 % | WEIGHT: 266.9 LBS | BODY MASS INDEX: 39.53 KG/M2 | SYSTOLIC BLOOD PRESSURE: 108 MMHG

## 2018-09-14 DIAGNOSIS — C61 PROSTATE CANCER (H): ICD-10-CM

## 2018-09-14 DIAGNOSIS — N18.30 TYPE 2 DIABETES MELLITUS WITH STAGE 3 CHRONIC KIDNEY DISEASE, WITHOUT LONG-TERM CURRENT USE OF INSULIN (H): ICD-10-CM

## 2018-09-14 DIAGNOSIS — E11.22 TYPE 2 DIABETES MELLITUS WITH STAGE 3 CHRONIC KIDNEY DISEASE, WITHOUT LONG-TERM CURRENT USE OF INSULIN (H): ICD-10-CM

## 2018-09-14 DIAGNOSIS — N40.1 BENIGN PROSTATIC HYPERPLASIA WITH URINARY OBSTRUCTION: ICD-10-CM

## 2018-09-14 DIAGNOSIS — N13.8 BENIGN PROSTATIC HYPERPLASIA WITH URINARY OBSTRUCTION: ICD-10-CM

## 2018-09-14 DIAGNOSIS — Z01.818 PREOP GENERAL PHYSICAL EXAM: Primary | ICD-10-CM

## 2018-09-14 LAB
ALBUMIN UR-MCNC: 100 MG/DL
ANION GAP SERPL CALCULATED.3IONS-SCNC: 8 MMOL/L (ref 3–14)
APPEARANCE UR: ABNORMAL
BACTERIA #/AREA URNS HPF: ABNORMAL /HPF
BILIRUB UR QL STRIP: NEGATIVE
BUN SERPL-MCNC: 36 MG/DL (ref 7–30)
CALCIUM SERPL-MCNC: 9.1 MG/DL (ref 8.5–10.1)
CHLORIDE SERPL-SCNC: 106 MMOL/L (ref 94–109)
CO2 SERPL-SCNC: 25 MMOL/L (ref 20–32)
COLOR UR AUTO: YELLOW
CREAT SERPL-MCNC: 1.55 MG/DL (ref 0.66–1.25)
DIFFERENTIAL METHOD BLD: ABNORMAL
ERYTHROCYTE [DISTWIDTH] IN BLOOD BY AUTOMATED COUNT: 15.4 % (ref 10–15)
GFR SERPL CREATININE-BSD FRML MDRD: 44 ML/MIN/1.7M2
GLUCOSE SERPL-MCNC: 128 MG/DL (ref 70–99)
GLUCOSE UR STRIP-MCNC: NEGATIVE MG/DL
HBA1C MFR BLD: 6.7 % (ref 0–5.6)
HCT VFR BLD AUTO: 36.4 % (ref 40–53)
HGB BLD-MCNC: 11.8 G/DL (ref 13.3–17.7)
HGB UR QL STRIP: ABNORMAL
KETONES UR STRIP-MCNC: NEGATIVE MG/DL
LEUKOCYTE ESTERASE UR QL STRIP: ABNORMAL
LYMPHOCYTES # BLD AUTO: 1.5 10E9/L (ref 0.8–5.3)
LYMPHOCYTES NFR BLD AUTO: 5 %
MCH RBC QN AUTO: 33.2 PG (ref 26.5–33)
MCHC RBC AUTO-ENTMCNC: 32.4 G/DL (ref 31.5–36.5)
MCV RBC AUTO: 103 FL (ref 78–100)
MONOCYTES # BLD AUTO: 0.3 10E9/L (ref 0–1.3)
MONOCYTES NFR BLD AUTO: 1 %
NEUTROPHILS # BLD AUTO: 27.6 10E9/L (ref 1.6–8.3)
NEUTROPHILS NFR BLD AUTO: 94 %
NITRATE UR QL: NEGATIVE
NON-SQ EPI CELLS #/AREA URNS LPF: ABNORMAL /LPF
PH UR STRIP: 7 PH (ref 5–7)
PLATELET # BLD AUTO: 273 10E9/L (ref 150–450)
PLATELET # BLD EST: ABNORMAL 10*3/UL
POTASSIUM SERPL-SCNC: 4.8 MMOL/L (ref 3.4–5.3)
RBC # BLD AUTO: 3.55 10E12/L (ref 4.4–5.9)
RBC #/AREA URNS AUTO: ABNORMAL /HPF
RBC MORPH BLD: NORMAL
SODIUM SERPL-SCNC: 139 MMOL/L (ref 133–144)
SOURCE: ABNORMAL
SP GR UR STRIP: 1.02 (ref 1–1.03)
UROBILINOGEN UR STRIP-ACNC: 0.2 EU/DL (ref 0.2–1)
WBC # BLD AUTO: 29.4 10E9/L (ref 4–11)
WBC #/AREA URNS AUTO: >100 /HPF

## 2018-09-14 PROCEDURE — 83036 HEMOGLOBIN GLYCOSYLATED A1C: CPT | Performed by: INTERNAL MEDICINE

## 2018-09-14 PROCEDURE — 81001 URINALYSIS AUTO W/SCOPE: CPT | Performed by: INTERNAL MEDICINE

## 2018-09-14 PROCEDURE — 85025 COMPLETE CBC W/AUTO DIFF WBC: CPT | Performed by: INTERNAL MEDICINE

## 2018-09-14 PROCEDURE — 80048 BASIC METABOLIC PNL TOTAL CA: CPT | Performed by: INTERNAL MEDICINE

## 2018-09-14 PROCEDURE — 99214 OFFICE O/P EST MOD 30 MIN: CPT | Performed by: INTERNAL MEDICINE

## 2018-09-14 PROCEDURE — 36415 COLL VENOUS BLD VENIPUNCTURE: CPT | Performed by: INTERNAL MEDICINE

## 2018-09-14 NOTE — LETTER
[address]  [address]  [place]  [address]  Tel: [phone]    September 14, 2018    Ross Caballero  [address] [facility] [address]        Dear Mr. Caballero,    Here are the results from your recent labs.    If you have any further questions or problems, please contact our office.      Sincerely,    Errol Maldonado MD/ [facility]          Enclosure: Lab Results      Results for orders placed or performed in visit on 09/14/18   Basic metabolic panel   Result Value Ref Range    Sodium 139 133 - 144 mmol/L    Potassium 4.8 3.4 - 5.3 mmol/L    Chloride 106 94 - 109 mmol/L    Carbon Dioxide 25 20 - 32 mmol/L    Anion Gap 8 3 - 14 mmol/L    Glucose 128 (H) 70 - 99 mg/dL    Urea Nitrogen 36 (H) 7 - 30 mg/dL    Creatinine 1.55 (H) 0.66 - 1.25 mg/dL    GFR Estimate 44 (L) >60 mL/min/1.7m2    GFR Estimate If Black 53 (L) >60 mL/min/1.7m2    Calcium 9.1 8.5 - 10.1 mg/dL   CBC with platelets differential   Result Value Ref Range    WBC 29.4 (H) 4.0 - 11.0 10e9/L    RBC Count 3.55 (L) 4.4 - 5.9 10e12/L    Hemoglobin 11.8 (L) 13.3 - 17.7 g/dL    Hematocrit 36.4 (L) 40.0 - 53.0 %     (H) 78 - 100 fl    MCH 33.2 (H) 26.5 - 33.0 pg    MCHC 32.4 31.5 - 36.5 g/dL    RDW 15.4 (H) 10.0 - 15.0 %    Platelet Count 273 150 - 450 10e9/L    % Neutrophils 94.0 %    % Lymphocytes 5.0 %    % Monocytes 1.0 %    Absolute Neutrophil 27.6 (H) 1.6 - 8.3 10e9/L    Absolute Lymphocytes 1.5 0.8 - 5.3 10e9/L    Absolute Monocytes 0.3 0.0 - 1.3 10e9/L    RBC Morphology Normal     Platelet Estimate       Automated count confirmed.  Platelet morphology is normal.    Diff Method Automated Method    UA reflex to Microscopic and Culture   Result Value Ref Range    Color Urine Yellow     Appearance Urine Cloudy     Glucose Urine Negative NEG^Negative mg/dL    Bilirubin Urine Negative NEG^Negative    Ketones Urine Negative NEG^Negative mg/dL    Specific Gravity Urine 1.020 1.003 - 1.035    Blood Urine Moderate (A)  NEG^Negative    pH Urine 7.0 5.0 - 7.0 pH    Protein Albumin Urine 100 (A) NEG^Negative mg/dL    Urobilinogen Urine 0.2 0.2 - 1.0 EU/dL    Nitrite Urine Negative NEG^Negative    Leukocyte Esterase Urine Small (A) NEG^Negative    Source PENDING    Hemoglobin A1c   Result Value Ref Range    Hemoglobin A1C 6.7 (H) 0 - 5.6 %   Urine Microscopic   Result Value Ref Range    WBC Urine >100 (A) OTO5^0 - 5 /HPF    RBC Urine 5-10 (A) OTO2^O - 2 /HPF    Squamous Epithelial /LPF Urine Few FEW^Few /LPF    Bacteria Urine Many (A) NEG^Negative /HPF

## 2018-09-14 NOTE — MR AVS SNAPSHOT
After Visit Summary   9/14/2018    Ross Caballero    MRN: [id number]           Patient Information     Date Of Birth          1940        Visit Information        Provider Department      9/14/2018 10:30 AM Errol Maldonado MD [facility]        Today's Diagnoses     Preop general physical exam    -  1    Type 2 diabetes mellitus with stage 3 chronic kidney disease, without long-term current use of insulin (H)          Care Instructions      Before Your Surgery      Call your surgeon if there is any change in your health. This includes signs of a cold or flu (such as a sore throat, runny nose, cough, rash or fever).    Do not smoke, drink alcohol or take over the counter medicine (unless your surgeon or primary care doctor tells you to) for the 24 hours before and after surgery.    If you take prescribed drugs: Follow your doctor s orders about which medicines to take and which to stop until after surgery.    Eating and drinking prior to surgery: follow the instructions from your surgeon    Take a shower or bath the night before surgery. Use the soap your surgeon gave you to gently clean your skin. If you do not have soap from your surgeon, use your regular soap. Do not shave or scrub the surgery site.  Wear clean pajamas and have clean sheets on your bed.           Follow-ups after your visit        Your next 10 appointments already scheduled     Sep 27, 2018   Procedure with Nima Calvert MD   [facility] (--)    [address]  [facility] [id number] [phone]              Who to contact     If you have questions or need follow up information about today's clinic visit or your schedule please contact [facility] directly at [phone].  Normal or non-critical lab and imaging results will be communicated to you by MyChart, letter or phone within 4 business days after the clinic has received the results. If you do  "not hear from us within 7 days, please contact the clinic through [employer] or phone. If you have a critical or abnormal lab result, we will notify you by phone as soon as possible.  Submit refill requests through [employer] or call your pharmacy and they will forward the refill request to us. Please allow 3 business days for your refill to be completed.          Additional Information About Your Visit        Care EveryWhere ID     This is your Care EveryWhere ID. This could be used by other organizations to access your [facility] medical records  [id number]        Your Vitals Were     Pulse Height Pulse Oximetry BMI (Body Mass Index)          107 5' 9\" (1.753 m) 95% 39.41 kg/m2         Blood Pressure from Last 3 Encounters:   09/14/18 108/68   05/18/18 117/73   05/03/18 (!) 154/100    Weight from Last 3 Encounters:   09/14/18 266 lb 14.4 oz (121.1 kg)   05/18/18 275 lb (124.7 kg)   05/03/18 285 lb (129.3 kg)              We Performed the Following     Basic metabolic panel     CBC with platelets differential     Hemoglobin A1c     UA reflex to Microscopic and Culture        Primary Care Provider Office Phone # Fax #    Errol Salvatore Maldonado [phone] [address] [address]        Equal Access to Services     [facility]: Hadii aad ku hadasho Soomaali, waaxda luqadaha, qaybta kaalmada adeegyada, waxay idiin hayaan vlad gates . So [facility] [phone].    ATENCIÓN: Si habla español, tiene a sebastian disposición servicios gratuitos de asistencia lingüística. Llame al [phone].    We comply with applicable federal civil rights laws and [place] laws. We do not discriminate on the basis of race, color, national origin, age, disability, sex, sexual orientation, or gender identity.            Thank you!     Thank you for choosing [facility]  for your care. Our goal is always to provide you with excellent care. Hearing back from our patients is one way " we can continue to improve our services. Please take a few minutes to complete the written survey that you may receive in the mail after your visit with us. Thank you!             Your Updated Medication List - Protect others around you: Learn how to safely use, store and throw away your medicines at www.disposemymeds.org.          This list is accurate as of 9/14/18 10:40 AM.  Always use your most recent med list.                   Brand Name Dispense Instructions for use Diagnosis    blood glucose monitoring test strip    RELION ULTIMA TEST    1 Box    Use to test blood sugar 1 times daily or as directed.  Ok to substitute alternative if insurance prefers.    Type 2 diabetes mellitus with stage 3 chronic kidney disease, without long-term current use of insulin (H)       Blood Pressure Monitoring Kit     1 kit    Use as directed    Essential hypertension       RELION ULTIMA GLUCOSE SYSTEM w/Device Kit     1 kit    1 kit daily Use to test blood sugars 1 times daily or as directed.  Ok to substitute alternative if insurance prefers.    Type 2 diabetes mellitus with other specified complication (H)       RELION ULTRA THIN PLUS LANCETS Misc     100 each    1 lancet daily Use to test blood sugars 1 times daily or as directed.  Ok to substitute alternative if insurance prefers.    Type 2 diabetes mellitus with stage 3 chronic kidney disease, without long-term current use of insulin (H)       tamsulosin 0.4 MG capsule    FLOMAX    180 capsule    Take 2 capsules (0.8 mg) by mouth At Bedtime    Benign prostatic hyperplasia, presence of lower urinary tract symptoms unspecified

## 2018-09-14 NOTE — PROGRESS NOTES
[facility]  [address] [facility] [address]  [phone]  Dept: [phone]    PRE-OP EVALUATION:  Today's date: 2018    Ross Caballero (: 1940) presents for pre-operative evaluation assessment as requested by Dr. Calvert.  He requires evaluation and anesthesia risk assessment prior to undergoing surgery/procedure for treatment of BPH and prostate cancer.    Proposed Surgery/ Procedure:   Date of Surgery/ Procedure: 2018  Time of Surgery/ Procedure: 1030am-11am  Hospital/Surgical Facility: [facility]   Fax number for surgical facility: [phone]  Primary Physician: Errol Maldonado  Type of Anesthesia Anticipated: to be determined    Patient has a Health Care Directive or Living Will:  NO    1. NO - Do you have a history of heart attack, stroke, stent, bypass or surgery on an artery in the head, neck, heart or legs?  2. NO - Do you ever have any pain or discomfort in your chest?  3. NO - Do you have a history of  Heart Failure?  4. NO - Are you troubled by shortness of breath when: walking on the level, up a slight hill or at night?  5. NO - Do you currently have a cold, bronchitis or other respiratory infection?  6. YES - DO YOU HAVE A COUGH, SHORTNESS OF BREATH OR WHEEZING?   7. NO - Do you sometimes get pains in the calves of your legs when you walk?  8. NO - Do you or anyone in your family have previous history of blood clots?  9. NO - Do you or does anyone in your family have a serious bleeding problem such as prolonged bleeding following surgeries or cuts?  10. NO - Have you ever had problems with anemia or been told to take iron pills?  11. NO - Have you had any abnormal blood loss such as black, tarry or bloody stools, or abnormal vaginal bleeding?  12. NO - Have you ever had a blood transfusion?  13. NO - Have you or any of your relatives ever had problems with anesthesia?  14. NO - Do you have sleep apnea, excessive snoring or  daytime drowsiness?  15. NO - Do you have any prosthetic heart valves?  16. NO - Do you have prosthetic joints?  17. NO - Is there any chance that you may be pregnant?      HPI:     HPI related to upcoming procedure:     Patient has been previously diagnosed with prostate cancer. Follows up closely with the Urologist. Also has difficulty with passing of urine so he currently has an indwelling Oro catheter.  Has not noticed any gross hematuria of the urine in the urine collection bag.      MEDICAL HISTORY:     Patient Active Problem List    Diagnosis Date Noted     Type 2 diabetes mellitus with stage 3 chronic kidney disease, without long-term current use of insulin (H) 05/03/2018     Priority: Medium     Chronic kidney disease, stage III (moderate) 10/28/2016     Priority: Medium     Advanced directives, counseling/discussion 10/27/2016     Priority: Medium     Advance Care Planning 10/27/2016: ACP Review of Chart / Resources Provided:  Reviewed chart for advance care plan.  Ross Caballero has no plan or code status on file. Discussed available resources, patient declined at this time.  Added by Mabel Carrero             Essential hypertension 10/17/2016     Priority: Medium     Non morbid obesity due to excess calories 10/17/2016     Priority: Medium     Polycystic kidney disease 09/06/2016     Priority: Medium     Pyelonephritis 07/20/2016     Priority: Medium     Calculus of kidney      Priority: Medium     with lithrotripsy        Osteomyelitis (H)      Priority: Medium     toe amputation        Past Medical History:   Diagnosis Date     Benign essential hypertension      BPH (benign prostatic hyperplasia)     flomax      Calculus of kidney 2002    with lithrotripsy      Diabetes mellitus (H)      Melanoma (H)      Osteoarthritis, knee     has had synvisc type shot      Osteomyelitis (H)     toe amputation     Past Surgical History:   Procedure Laterality Date     AMPUTATION of toe       HERNIA REPAIR       "Umbilical hernia      melanoma removal       Current Outpatient Prescriptions   Medication Sig Dispense Refill     blood glucose monitoring (RELION ULTIMA TEST) test strip Use to test blood sugar 1 times daily or as directed.  Ok to substitute alternative if insurance prefers. 1 Box 11     Blood Glucose Monitoring Suppl (RELION ULTIMA GLUCOSE SYSTEM) W/DEVICE KIT 1 kit daily Use to test blood sugars 1 times daily or as directed.  Ok to substitute alternative if insurance prefers. 1 kit 0     Blood Pressure Monitoring KIT Use as directed 1 kit 0     RELION ULTRA THIN PLUS LANCETS MISC 1 lancet daily Use to test blood sugars 1 times daily or as directed.  Ok to substitute alternative if insurance prefers. 100 each 11     tamsulosin (FLOMAX) 0.4 MG capsule Take 2 capsules (0.8 mg) by mouth At Bedtime 180 capsule 1     OTC products: no recent use of OTC ASA, NSAIDS or Steroids    Allergies   Allergen Reactions     No Known Allergies       Latex Allergy: NO    Social History   Substance Use Topics     Smoking status: Former Smoker     Quit date: 1/1/1970     Smokeless tobacco: Never Used     Alcohol use 0.0 - 0.6 oz/week     0 - 1 Standard drinks or equivalent per week      Comment: rare     History   Drug Use No       REVIEW OF SYSTEMS:   C: NEGATIVE for fever, chills, change in weight  E/M: NEGATIVE for ear, mouth and throat problems  R: NEGATIVE for significant cough or SOB  CV: NEGATIVE for chest pain, palpitations or peripheral edema  GI: NEGATIVE for nausea, abdominal pain, heartburn, or change in bowel habits  : NEGATIVE for frequency, dysuria, or hematuria  N: NEGATIVE for weakness, dizziness or paresthesias  H: NEGATIVE for bleeding problems      EXAM:   /68 (BP Location: Left arm, Patient Position: Chair, Cuff Size: Adult Large)  Pulse 107  Ht 5' 9\" (1.753 m)  Wt 266 lb 14.4 oz (121.1 kg)  SpO2 95%  BMI 39.41 kg/m2    GENERAL APPEARANCE: healthy, alert and no distress    NECK: no adenopathy, no " asymmetry, masses, or scars and thyroid normal to palpation    RESP: lungs clear to auscultation - no rales, rhonchi or wheezes    CV: regular rates and rhythm, normal S1 S2, no S3 or S4 and no murmur, click or rub -    ABDOMEN:  soft, nontender, no HSM or masses and bowel sounds normal    NEURO: Normal strength and tone, sensory exam grossly normal, mentation intact and speech normal    PSYCH: mentation appears normal. and affect normal/bright    LYMPHATICS: No axillary, cervical, inguinal, or supraclavicular nodes      DIAGNOSTICS:     Labs Drawn and in Process:   Unresulted Labs Ordered in the Past 30 Days of this Admission     No orders found from 7/16/2018 to 9/15/2018.          Recent Labs   Lab Test 05/13/18 05/10/18  05/03/18   0956  08/10/17   1334  02/03/17   1132  10/27/16   0941  08/29/16   1400   07/25/16   1339   HGB   --    --    --    --    --   14.6  12.7*   --   13.0*   PLT   --    --    --    --    --   250   --    --   620*   INR   --   1.1   --    --    --    --    --    --    --    NA   --    --   143   --   142  140   --    < >  139   POTASSIUM  4.4   --   4.8   --   5.0  4.5  4.4   < >  5.1   CR  1.95*   --   2.18*   --   1.39*  1.21   --    < >  1.52*   A1C   --    --   6.3*  6.2*  6.3*  5.9   --    --    --     < > = values in this interval not displayed.        IMPRESSION:   Diagnosis/reason for consult:  BPH and prostate cancer    The proposed surgical procedure is considered INTERMEDIATE risk.    REVISED CARDIAC RISK INDEX  The patient has the following serious cardiovascular risks for perioperative complications such as (MI, PE, VFib and 3  AV Block):  No serious cardiac risks  INTERPRETATION: 0 risks: Class I (very low risk - 0.4% complication rate)    The patient has the following additional risks for perioperative complications:  No identified additional risks      ICD-10-CM    1. Preop general physical exam Z01.818 Basic metabolic panel     CBC with platelets differential     UA  reflex to Microscopic and Culture     Urine Microscopic   2. Prostate cancer (H) C61    3. Benign prostatic hyperplasia with urinary obstruction N40.1     N13.8    4. Type 2 diabetes mellitus with stage 3 chronic kidney disease, without long-term current use of insulin (H) E11.22 Hemoglobin A1c    N18.3        RECOMMENDATIONS:     --Consult hospital rounder / IM to assist post-op medical management (for postoperative blood glucose management)    APPROVAL GIVEN to proceed with proposed procedure, without further diagnostic evaluation       Signed Electronically by: Errol Maldonado MD    Copy of this evaluation report is provided to requesting physician.    [place] Preop Guidelines    Revised Cardiac Risk Index

## 2018-09-24 NOTE — H&P (VIEW-ONLY)
[facility]  [address] [facility] [address]  [phone]  Dept: [phone]    PRE-OP EVALUATION:  Today's date: 2018    Ross Caballero (: 1940) presents for pre-operative evaluation assessment as requested by Dr. Calvert.  He requires evaluation and anesthesia risk assessment prior to undergoing surgery/procedure for treatment of BPH and prostate cancer.    Proposed Surgery/ Procedure:   Date of Surgery/ Procedure: 2018  Time of Surgery/ Procedure: 1030am-11am  Hospital/Surgical Facility: [facility]   Fax number for surgical facility: [phone]  Primary Physician: Errol Maldonado  Type of Anesthesia Anticipated: to be determined    Patient has a Health Care Directive or Living Will:  NO    1. NO - Do you have a history of heart attack, stroke, stent, bypass or surgery on an artery in the head, neck, heart or legs?  2. NO - Do you ever have any pain or discomfort in your chest?  3. NO - Do you have a history of  Heart Failure?  4. NO - Are you troubled by shortness of breath when: walking on the level, up a slight hill or at night?  5. NO - Do you currently have a cold, bronchitis or other respiratory infection?  6. YES - DO YOU HAVE A COUGH, SHORTNESS OF BREATH OR WHEEZING?   7. NO - Do you sometimes get pains in the calves of your legs when you walk?  8. NO - Do you or anyone in your family have previous history of blood clots?  9. NO - Do you or does anyone in your family have a serious bleeding problem such as prolonged bleeding following surgeries or cuts?  10. NO - Have you ever had problems with anemia or been told to take iron pills?  11. NO - Have you had any abnormal blood loss such as black, tarry or bloody stools, or abnormal vaginal bleeding?  12. NO - Have you ever had a blood transfusion?  13. NO - Have you or any of your relatives ever had problems with anesthesia?  14. NO - Do you have sleep apnea, excessive snoring or  daytime drowsiness?  15. NO - Do you have any prosthetic heart valves?  16. NO - Do you have prosthetic joints?  17. NO - Is there any chance that you may be pregnant?      HPI:     HPI related to upcoming procedure:     Patient has been previously diagnosed with prostate cancer. Follows up closely with the Urologist. Also has difficulty with passing of urine so he currently has an indwelling Oro catheter.  Has not noticed any gross hematuria of the urine in the urine collection bag.      MEDICAL HISTORY:     Patient Active Problem List    Diagnosis Date Noted     Type 2 diabetes mellitus with stage 3 chronic kidney disease, without long-term current use of insulin (H) 05/03/2018     Priority: Medium     Chronic kidney disease, stage III (moderate) 10/28/2016     Priority: Medium     Advanced directives, counseling/discussion 10/27/2016     Priority: Medium     Advance Care Planning 10/27/2016: ACP Review of Chart / Resources Provided:  Reviewed chart for advance care plan.  Ross Caballero has no plan or code status on file. Discussed available resources, patient declined at this time.  Added by Mabel Carrero             Essential hypertension 10/17/2016     Priority: Medium     Non morbid obesity due to excess calories 10/17/2016     Priority: Medium     Polycystic kidney disease 09/06/2016     Priority: Medium     Pyelonephritis 07/20/2016     Priority: Medium     Calculus of kidney      Priority: Medium     with lithrotripsy        Osteomyelitis (H)      Priority: Medium     toe amputation        Past Medical History:   Diagnosis Date     Benign essential hypertension      BPH (benign prostatic hyperplasia)     flomax      Calculus of kidney 2002    with lithrotripsy      Diabetes mellitus (H)      Melanoma (H)      Osteoarthritis, knee     has had synvisc type shot      Osteomyelitis (H)     toe amputation     Past Surgical History:   Procedure Laterality Date     AMPUTATION of toe       HERNIA REPAIR       "Umbilical hernia      melanoma removal       Current Outpatient Prescriptions   Medication Sig Dispense Refill     blood glucose monitoring (RELION ULTIMA TEST) test strip Use to test blood sugar 1 times daily or as directed.  Ok to substitute alternative if insurance prefers. 1 Box 11     Blood Glucose Monitoring Suppl (RELION ULTIMA GLUCOSE SYSTEM) W/DEVICE KIT 1 kit daily Use to test blood sugars 1 times daily or as directed.  Ok to substitute alternative if insurance prefers. 1 kit 0     Blood Pressure Monitoring KIT Use as directed 1 kit 0     RELION ULTRA THIN PLUS LANCETS MISC 1 lancet daily Use to test blood sugars 1 times daily or as directed.  Ok to substitute alternative if insurance prefers. 100 each 11     tamsulosin (FLOMAX) 0.4 MG capsule Take 2 capsules (0.8 mg) by mouth At Bedtime 180 capsule 1     OTC products: no recent use of OTC ASA, NSAIDS or Steroids    Allergies   Allergen Reactions     No Known Allergies       Latex Allergy: NO    Social History   Substance Use Topics     Smoking status: Former Smoker     Quit date: 1/1/1970     Smokeless tobacco: Never Used     Alcohol use 0.0 - 0.6 oz/week     0 - 1 Standard drinks or equivalent per week      Comment: rare     History   Drug Use No       REVIEW OF SYSTEMS:   C: NEGATIVE for fever, chills, change in weight  E/M: NEGATIVE for ear, mouth and throat problems  R: NEGATIVE for significant cough or SOB  CV: NEGATIVE for chest pain, palpitations or peripheral edema  GI: NEGATIVE for nausea, abdominal pain, heartburn, or change in bowel habits  : NEGATIVE for frequency, dysuria, or hematuria  N: NEGATIVE for weakness, dizziness or paresthesias  H: NEGATIVE for bleeding problems      EXAM:   /68 (BP Location: Left arm, Patient Position: Chair, Cuff Size: Adult Large)  Pulse 107  Ht 5' 9\" (1.753 m)  Wt 266 lb 14.4 oz (121.1 kg)  SpO2 95%  BMI 39.41 kg/m2    GENERAL APPEARANCE: healthy, alert and no distress    NECK: no adenopathy, no " asymmetry, masses, or scars and thyroid normal to palpation    RESP: lungs clear to auscultation - no rales, rhonchi or wheezes    CV: regular rates and rhythm, normal S1 S2, no S3 or S4 and no murmur, click or rub -    ABDOMEN:  soft, nontender, no HSM or masses and bowel sounds normal    NEURO: Normal strength and tone, sensory exam grossly normal, mentation intact and speech normal    PSYCH: mentation appears normal. and affect normal/bright    LYMPHATICS: No axillary, cervical, inguinal, or supraclavicular nodes      DIAGNOSTICS:     Labs Drawn and in Process:   Unresulted Labs Ordered in the Past 30 Days of this Admission     No orders found from 7/16/2018 to 9/15/2018.          Recent Labs   Lab Test 05/13/18 05/10/18  05/03/18   0956  08/10/17   1334  02/03/17   1132  10/27/16   0941  08/29/16   1400   07/25/16   1339   HGB   --    --    --    --    --   14.6  12.7*   --   13.0*   PLT   --    --    --    --    --   250   --    --   620*   INR   --   1.1   --    --    --    --    --    --    --    NA   --    --   143   --   142  140   --    < >  139   POTASSIUM  4.4   --   4.8   --   5.0  4.5  4.4   < >  5.1   CR  1.95*   --   2.18*   --   1.39*  1.21   --    < >  1.52*   A1C   --    --   6.3*  6.2*  6.3*  5.9   --    --    --     < > = values in this interval not displayed.        IMPRESSION:   Diagnosis/reason for consult:  BPH and prostate cancer    The proposed surgical procedure is considered INTERMEDIATE risk.    REVISED CARDIAC RISK INDEX  The patient has the following serious cardiovascular risks for perioperative complications such as (MI, PE, VFib and 3  AV Block):  No serious cardiac risks  INTERPRETATION: 0 risks: Class I (very low risk - 0.4% complication rate)    The patient has the following additional risks for perioperative complications:  No identified additional risks      ICD-10-CM    1. Preop general physical exam Z01.818 Basic metabolic panel     CBC with platelets differential     UA  reflex to Microscopic and Culture     Urine Microscopic   2. Prostate cancer (H) C61    3. Benign prostatic hyperplasia with urinary obstruction N40.1     N13.8    4. Type 2 diabetes mellitus with stage 3 chronic kidney disease, without long-term current use of insulin (H) E11.22 Hemoglobin A1c    N18.3        RECOMMENDATIONS:     --Consult hospital rounder / IM to assist post-op medical management (for postoperative blood glucose management)    APPROVAL GIVEN to proceed with proposed procedure, without further diagnostic evaluation       Signed Electronically by: Errol Maldonado MD    Copy of this evaluation report is provided to requesting physician.    [facility] Preop Guidelines    Revised Cardiac Risk Index

## 2018-09-27 ENCOUNTER — SURGERY (OUTPATIENT)
Age: 78
End: 2018-09-27

## 2018-09-27 ENCOUNTER — ANESTHESIA EVENT (OUTPATIENT)
Dept: SURGERY | Facility: CLINIC | Age: 78
End: 2018-09-27
Payer: MEDICARE

## 2018-09-27 ENCOUNTER — HOSPITAL ENCOUNTER (OUTPATIENT)
Facility: CLINIC | Age: 78
Discharge: HOME | End: 2018-09-27
Attending: UROLOGY | Admitting: UROLOGY
Payer: MEDICARE

## 2018-09-27 ENCOUNTER — ANESTHESIA (OUTPATIENT)
Dept: SURGERY | Facility: CLINIC | Age: 78
End: 2018-09-27
Payer: MEDICARE

## 2018-09-27 VITALS
WEIGHT: 261 LBS | HEIGHT: 69 IN | BODY MASS INDEX: 38.66 KG/M2 | OXYGEN SATURATION: 97 % | RESPIRATION RATE: 18 BRPM | HEART RATE: 99 BPM | DIASTOLIC BLOOD PRESSURE: 78 MMHG | SYSTOLIC BLOOD PRESSURE: 132 MMHG | TEMPERATURE: 96.3 F

## 2018-09-27 DIAGNOSIS — C61 PROSTATE CANCER (H): Primary | ICD-10-CM

## 2018-09-27 LAB — GLUCOSE BLDC GLUCOMTR-MCNC: 130 MG/DL (ref 70–99)

## 2018-09-27 PROCEDURE — 25000125 ZZHC RX 250: Performed by: UROLOGY

## 2018-09-27 PROCEDURE — 25000128 H RX IP 250 OP 636: Performed by: ANESTHESIOLOGY

## 2018-09-27 PROCEDURE — 25000128 H RX IP 250 OP 636: Performed by: UROLOGY

## 2018-09-27 PROCEDURE — 36000075 ZZH SURGERY LEVEL 6 EA 15 ADDTL MIN: Performed by: UROLOGY

## 2018-09-27 PROCEDURE — 37000008 ZZH ANESTHESIA TECHNICAL FEE, 1ST 30 MIN: Performed by: UROLOGY

## 2018-09-27 PROCEDURE — 37000009 ZZH ANESTHESIA TECHNICAL FEE, EACH ADDTL 15 MIN: Performed by: UROLOGY

## 2018-09-27 PROCEDURE — 82962 GLUCOSE BLOOD TEST: CPT

## 2018-09-27 PROCEDURE — 27210794 ZZH OR GENERAL SUPPLY STERILE: Performed by: UROLOGY

## 2018-09-27 PROCEDURE — C2618 PROBE/NEEDLE, CRYO: HCPCS | Performed by: UROLOGY

## 2018-09-27 PROCEDURE — 25000566 ZZH SEVOFLURANE, EA 15 MIN: Performed by: UROLOGY

## 2018-09-27 PROCEDURE — 71000027 ZZH RECOVERY PHASE 2 EACH 15 MINS: Performed by: UROLOGY

## 2018-09-27 PROCEDURE — 40000170 ZZH STATISTIC PRE-PROCEDURE ASSESSMENT II: Performed by: UROLOGY

## 2018-09-27 PROCEDURE — 25000125 ZZHC RX 250: Performed by: NURSE ANESTHETIST, CERTIFIED REGISTERED

## 2018-09-27 PROCEDURE — 25000128 H RX IP 250 OP 636: Performed by: NURSE ANESTHETIST, CERTIFIED REGISTERED

## 2018-09-27 PROCEDURE — 36000073 ZZH SURGERY LEVEL 6 1ST 30 MIN: Performed by: UROLOGY

## 2018-09-27 PROCEDURE — 71000012 ZZH RECOVERY PHASE 1 LEVEL 1 FIRST HR: Performed by: UROLOGY

## 2018-09-27 PROCEDURE — 71000013 ZZH RECOVERY PHASE 1 LEVEL 1 EA ADDTL HR: Performed by: UROLOGY

## 2018-09-27 RX ORDER — FENTANYL CITRATE 50 UG/ML
25-50 INJECTION, SOLUTION INTRAMUSCULAR; INTRAVENOUS
Status: DISCONTINUED | OUTPATIENT
Start: 2018-09-27 | End: 2018-09-27 | Stop reason: HOSPADM

## 2018-09-27 RX ORDER — ONDANSETRON 2 MG/ML
4 INJECTION INTRAMUSCULAR; INTRAVENOUS EVERY 30 MIN PRN
Status: DISCONTINUED | OUTPATIENT
Start: 2018-09-27 | End: 2018-09-27 | Stop reason: HOSPADM

## 2018-09-27 RX ORDER — IBUPROFEN 600 MG/1
600 TABLET, FILM COATED ORAL EVERY 6 HOURS PRN
Qty: 30 TABLET | Refills: 0 | Status: SHIPPED | OUTPATIENT
Start: 2018-09-27 | End: 2020-03-09

## 2018-09-27 RX ORDER — EPHEDRINE SULFATE 50 MG/ML
INJECTION, SOLUTION INTRAMUSCULAR; INTRAVENOUS; SUBCUTANEOUS PRN
Status: DISCONTINUED | OUTPATIENT
Start: 2018-09-27 | End: 2018-09-27

## 2018-09-27 RX ORDER — PROPOFOL 10 MG/ML
INJECTION, EMULSION INTRAVENOUS PRN
Status: DISCONTINUED | OUTPATIENT
Start: 2018-09-27 | End: 2018-09-27

## 2018-09-27 RX ORDER — ONDANSETRON 2 MG/ML
INJECTION INTRAMUSCULAR; INTRAVENOUS PRN
Status: DISCONTINUED | OUTPATIENT
Start: 2018-09-27 | End: 2018-09-27

## 2018-09-27 RX ORDER — IBUPROFEN 600 MG/1
600 TABLET, FILM COATED ORAL ONCE
Status: DISCONTINUED | OUTPATIENT
Start: 2018-09-27 | End: 2018-09-27 | Stop reason: HOSPADM

## 2018-09-27 RX ORDER — SODIUM CHLORIDE, SODIUM LACTATE, POTASSIUM CHLORIDE, CALCIUM CHLORIDE 600; 310; 30; 20 MG/100ML; MG/100ML; MG/100ML; MG/100ML
INJECTION, SOLUTION INTRAVENOUS CONTINUOUS
Status: DISCONTINUED | OUTPATIENT
Start: 2018-09-27 | End: 2018-09-27 | Stop reason: HOSPADM

## 2018-09-27 RX ORDER — HYDROMORPHONE HYDROCHLORIDE 1 MG/ML
.3-.5 INJECTION, SOLUTION INTRAMUSCULAR; INTRAVENOUS; SUBCUTANEOUS EVERY 10 MIN PRN
Status: DISCONTINUED | OUTPATIENT
Start: 2018-09-27 | End: 2018-09-27 | Stop reason: HOSPADM

## 2018-09-27 RX ORDER — MEPERIDINE HYDROCHLORIDE 25 MG/ML
12.5 INJECTION INTRAMUSCULAR; INTRAVENOUS; SUBCUTANEOUS
Status: DISCONTINUED | OUTPATIENT
Start: 2018-09-27 | End: 2018-09-27 | Stop reason: HOSPADM

## 2018-09-27 RX ORDER — FENTANYL CITRATE 50 UG/ML
INJECTION, SOLUTION INTRAMUSCULAR; INTRAVENOUS PRN
Status: DISCONTINUED | OUTPATIENT
Start: 2018-09-27 | End: 2018-09-27

## 2018-09-27 RX ORDER — CEFAZOLIN SODIUM 1 G/50ML
3 SOLUTION INTRAVENOUS
Status: COMPLETED | OUTPATIENT
Start: 2018-09-27 | End: 2018-09-27

## 2018-09-27 RX ORDER — ONDANSETRON 4 MG/1
4 TABLET, ORALLY DISINTEGRATING ORAL EVERY 30 MIN PRN
Status: DISCONTINUED | OUTPATIENT
Start: 2018-09-27 | End: 2018-09-27 | Stop reason: HOSPADM

## 2018-09-27 RX ORDER — LIDOCAINE HYDROCHLORIDE 20 MG/ML
INJECTION, SOLUTION INFILTRATION; PERINEURAL PRN
Status: DISCONTINUED | OUTPATIENT
Start: 2018-09-27 | End: 2018-09-27

## 2018-09-27 RX ORDER — NALOXONE HYDROCHLORIDE 0.4 MG/ML
.1-.4 INJECTION, SOLUTION INTRAMUSCULAR; INTRAVENOUS; SUBCUTANEOUS
Status: DISCONTINUED | OUTPATIENT
Start: 2018-09-27 | End: 2018-09-27 | Stop reason: HOSPADM

## 2018-09-27 RX ADMIN — ONDANSETRON 4 MG: 2 INJECTION INTRAMUSCULAR; INTRAVENOUS at 12:09

## 2018-09-27 RX ADMIN — PHENYLEPHRINE HYDROCHLORIDE 100 MCG: 10 INJECTION, SOLUTION INTRAMUSCULAR; INTRAVENOUS; SUBCUTANEOUS at 11:52

## 2018-09-27 RX ADMIN — Medication 5 MG: at 12:05

## 2018-09-27 RX ADMIN — ATROPA BELLADONNA AND OPIUM 30 MG: 16.2; 3 SUPPOSITORY RECTAL at 12:30

## 2018-09-27 RX ADMIN — FENTANYL CITRATE 50 MCG: 50 INJECTION, SOLUTION INTRAMUSCULAR; INTRAVENOUS at 11:39

## 2018-09-27 RX ADMIN — FENTANYL CITRATE 50 MCG: 50 INJECTION, SOLUTION INTRAMUSCULAR; INTRAVENOUS at 11:29

## 2018-09-27 RX ADMIN — PHENYLEPHRINE HYDROCHLORIDE 100 MCG: 10 INJECTION, SOLUTION INTRAMUSCULAR; INTRAVENOUS; SUBCUTANEOUS at 12:05

## 2018-09-27 RX ADMIN — LIDOCAINE HYDROCHLORIDE 100 MG: 20 INJECTION, SOLUTION INFILTRATION; PERINEURAL at 10:58

## 2018-09-27 RX ADMIN — LIDOCAINE HYDROCHLORIDE 10 ML: 20 JELLY TOPICAL at 12:30

## 2018-09-27 RX ADMIN — Medication 5 MG: at 11:52

## 2018-09-27 RX ADMIN — PHENYLEPHRINE HYDROCHLORIDE 100 MCG: 10 INJECTION, SOLUTION INTRAMUSCULAR; INTRAVENOUS; SUBCUTANEOUS at 12:18

## 2018-09-27 RX ADMIN — PHENYLEPHRINE HYDROCHLORIDE 100 MCG: 10 INJECTION, SOLUTION INTRAMUSCULAR; INTRAVENOUS; SUBCUTANEOUS at 11:11

## 2018-09-27 RX ADMIN — Medication 3 G: at 11:09

## 2018-09-27 RX ADMIN — PHENYLEPHRINE HYDROCHLORIDE 100 MCG: 10 INJECTION, SOLUTION INTRAMUSCULAR; INTRAVENOUS; SUBCUTANEOUS at 11:02

## 2018-09-27 RX ADMIN — PHENYLEPHRINE HYDROCHLORIDE 100 MCG: 10 INJECTION, SOLUTION INTRAMUSCULAR; INTRAVENOUS; SUBCUTANEOUS at 11:24

## 2018-09-27 RX ADMIN — PHENYLEPHRINE HYDROCHLORIDE 100 MCG: 10 INJECTION, SOLUTION INTRAMUSCULAR; INTRAVENOUS; SUBCUTANEOUS at 12:35

## 2018-09-27 RX ADMIN — PROPOFOL 180 MG: 10 INJECTION, EMULSION INTRAVENOUS at 10:59

## 2018-09-27 RX ADMIN — Medication 0.5 MG: at 13:02

## 2018-09-27 RX ADMIN — PHENYLEPHRINE HYDROCHLORIDE 100 MCG: 10 INJECTION, SOLUTION INTRAMUSCULAR; INTRAVENOUS; SUBCUTANEOUS at 11:13

## 2018-09-27 RX ADMIN — FENTANYL CITRATE 25 MCG: 50 INJECTION, SOLUTION INTRAMUSCULAR; INTRAVENOUS at 11:06

## 2018-09-27 RX ADMIN — SODIUM CHLORIDE, POTASSIUM CHLORIDE, SODIUM LACTATE AND CALCIUM CHLORIDE: 600; 310; 30; 20 INJECTION, SOLUTION INTRAVENOUS at 10:53

## 2018-09-27 RX ADMIN — PHENYLEPHRINE HYDROCHLORIDE 100 MCG: 10 INJECTION, SOLUTION INTRAMUSCULAR; INTRAVENOUS; SUBCUTANEOUS at 11:16

## 2018-09-27 RX ADMIN — Medication 5 MG: at 11:24

## 2018-09-27 RX ADMIN — Medication 5 MG: at 11:43

## 2018-09-27 RX ADMIN — PHENYLEPHRINE HYDROCHLORIDE 100 MCG: 10 INJECTION, SOLUTION INTRAMUSCULAR; INTRAVENOUS; SUBCUTANEOUS at 11:43

## 2018-09-27 RX ADMIN — FENTANYL CITRATE 25 MCG: 50 INJECTION, SOLUTION INTRAMUSCULAR; INTRAVENOUS at 12:23

## 2018-09-27 RX ADMIN — SODIUM CHLORIDE, POTASSIUM CHLORIDE, SODIUM LACTATE AND CALCIUM CHLORIDE: 600; 310; 30; 20 INJECTION, SOLUTION INTRAVENOUS at 12:11

## 2018-09-27 RX ADMIN — Medication 5 MG: at 11:16

## 2018-09-27 RX ADMIN — FENTANYL CITRATE 25 MCG: 50 INJECTION, SOLUTION INTRAMUSCULAR; INTRAVENOUS at 11:16

## 2018-09-27 RX ADMIN — FENTANYL CITRATE 50 MCG: 50 INJECTION INTRAMUSCULAR; INTRAVENOUS at 13:29

## 2018-09-27 NOTE — PROCEDURES
UROLOGY OPERATIVE REPORT    Ross Caballero  1940, 78 year old    SURGEON  Surgeon(s):  Nima Calvert MD    PREOP DIAGNOSIS  Prostate cancer (H)  (primary encounter diagnosis)    POSTOP DIAGNOSIS  Same    PROCEDURE  Procedure(s):  CYSTOSCOPY FLEXIBLE, CRYOABLATION PROSTATE  PROSTATE VOLUME STUDY  URINARY RETENTION    FINDINGS  Moderate sized gland    ANESTHESIA  General    STAFF  Circulator: Bienvenido Benavidez RN; Yasmine Mcbride RN  Scrub Person: Jonh Corrales    COMPLICATIONS  None    SPECIMEN  none    PROSTHESIS/ GRAFTS/ DEVICES  None    EBL  5cc    TECHNIQUE  The patient was taken to the operating room and placed in the dorsal  lithotomy position.  The perineum was shaved, prepped and draped in a  sterile fashion and the 18-Yakut Oro catheter was placed without  any resistance. The Ultrasound probe was positioned to ensure that the posterior margin of the prostate was parrallel to the proble.  Ultrasound was used to measure the prostate and it  was roughly 55 cc in size.  The cryo needles were used for a total of 5 zones.  Two temperature probes were used on the edge of the parenchyma, bordering on the recturm.    The needles were positioned and cystoscopy performed, and none of the  needles were in the bladder.  The urethral warmer was then placed  over a guidewire without any difficulty and ran throughout the case.  The needles were warmed with water at the skin edges to reduce the  chance of frostbite.  Two freeze-thaw cycles were performed at the  base of the prostate and then a pull back was performed and two  additional freeze-thaw cycles were carried out to cover the apex and  mid gland.  The ice ball was constantly monitored through each  freeze-thaw cycle to ensure there was no injury to the rectum.  The  needles were withdrawn and perineal pressure applied, and then a  sterile dressing applied and the final 20-Yakut Oro catheter  placed without difficulty.        PLAN  Short stay for  irrigation of catheter for hematuira.   Can go home tomorrow am without being seen by MD.   He'll f/u in a month at our [place] office.       Nima Calvert MD

## 2018-09-27 NOTE — IP AVS SNAPSHOT
MRN:[id number]                      After Visit Summary   9/27/2018    Ross Caballero    MRN: [id number]           Thank you!     Thank you for choosing [facility] for your care. Our goal is always to provide you with excellent care. Hearing back from our patients is one way we can continue to improve our services. Please take a few minutes to complete the written survey that you may receive in the mail after you visit with us. Thank you!        Patient Information     Date Of Birth          1940        About your hospital stay     You were admitted on:  September 27, 2018 You last received care in the:  [facility] PACU    You were discharged on:  September 27, 2018       Who to Call     For medical emergencies, please call 911.  For non-urgent questions about your medical care, please call your primary care provider or clinic, [phone]  For questions related to your surgery, please call your surgery clinic        Attending Provider     Provider Nima Cruz MD Urology       Primary Care Provider Office Phone # Fax #    Errol Savlatore Maldonado [phone]      After Care Instructions     Diet Instructions       Resume pre procedure diet            Discharge Instructions       Patient to arrange for follow up appointment in 4 weeks at a nursing visit in our office for a trial of void.            Encourage fluids       Encourage fluids at home to keep urine clear to light pink            No Alcohol       for 24 hours post procedure            No driving or operating machinery        until the day after procedure            No lifting over 15 pounds and no strenuous physical activity       for 2 weeks                  Further instructions from your care team         Same Day Surgery Discharge Instructions for  Sedation and General Anesthesia       It's not unusual to feel dizzy, light-headed or faint for up to 24 hours after surgery or while  taking pain medication.  If you have these symptoms: sit for a few minutes before standing and have someone assist you when you get up to walk or use the bathroom.      You should rest and relax for the next 24 hours. We recommend you make arrangements to have an adult stay with you for at least 24 hours after your discharge.  Avoid hazardous and strenuous activity.      DO NOT DRIVE any vehicle or operate mechanical equipment for 24 hours following the end of your surgery.  Even though you may feel normal, your reactions may be affected by the medication you have received.      Do not drink alcoholic beverages for 24 hours following surgery.       Slowly progress to your regular diet as you feel able. It's not unusual to feel nauseated and/or vomit after receiving anesthesia.  If you develop these symptoms, drink clear liquids (apple juice, ginger ale, broth, 7-up, etc. ) until you feel better.  If your nausea and vomiting persists for 24 hours, please notify your surgeon.        All narcotic pain medications, along with inactivity and anesthesia, can cause constipation. Drinking plenty of liquids and increasing fiber intake will help.      For any questions of a medical nature, call your surgeon.      Do not make important decisions for 24 hours.      If you had general anesthesia, you may have a sore throat for a couple of days related to the breathing tube used during surgery.  You may use Cepacol lozenges to help with this discomfort.  If it worsens or if you develop a fever, contact your surgeon.       If you feel your pain is not well managed with the pain medications prescribed by your surgeon, please contact your surgeon's office to let them know so they can address your concerns.           Discharge Instructions following Cryoblation of the Prostate  [facility]    Diet:    Diet as tolerated.  Drink at least 6 glasses of liquid per day.  Activity:    No heavy lifting or strenuous activity  "until approved by surgeon.    Short walks and stair climbing are permissible.  Care After Surgery    Do not hold urine in your bladder.  Always empty your bladder when you have the urge to urinate.    Do not strain to have a bowel movement.  If constipated, take over-the-counter stool softeners (follow directions on package).    Do not drive a car or have intercourse until approved by your surgeon.    It is not unusual to pass small clots or to have red-tinged urine.  If this occurs, decrease activity and increase your fluid intake.  o You may expect to have some blood for at least 3-4 weeks, especially at the beginning or end of urination.  If there is dark, thick blood with difficulty urinating call your surgeon.        **If you have questions or concerns about your procedure,   Call Dr. Calvert at [phone]**          Pending Results     No orders found from 9/25/2018 to 9/28/2018.            Admission Information     Date & Time Provider Department Dept. Phone    9/27/2018 Nima Calvert MD [facility] PACU [phone]      Your Vitals Were     Blood Pressure Temperature Respirations Height Weight Pulse Oximetry    133/73 96.3  F (35.7  C) 19 1.753 m (5' 9\") 118.4 kg (261 lb) 95%    BMI (Body Mass Index)                   38.54 kg/m2           Care EveryWhere ID     This is your Care EveryWhere ID. This could be used by other organizations to access your [facility] medical records  [id number]        Equal Access to Services     LISA TINAJERO AH: Hadii kenyetta gerber Soabram, waaxda luqadaha, qaybta kaalmada adewandayada, leo espinoza. So [facility] [phone].    ATENCIÓN: Si habla español, tiene a sebastian disposición servicios gratuitos de asistencia lingüística. Llame al [phone].    We comply with applicable federal civil rights laws and [place] laws. We do not discriminate on the basis of race, color, national origin, age, disability, sex, sexual orientation, or gender " identity.               Review of your medicines      START taking        Dose / Directions    ibuprofen 600 MG tablet   Commonly known as:  ADVIL/MOTRIN   Used for:  Prostate cancer (H)        Dose:  600 mg   Take 1 tablet (600 mg) by mouth every 6 hours as needed for other (For mild pain and temperature greater than 102F)   Quantity:  30 tablet   Refills:  0         CONTINUE these medicines which have NOT CHANGED        Dose / Directions    LUPRON DEPOT (4-MONTH) IM        Refills:  0       tamsulosin 0.4 MG capsule   Commonly known as:  FLOMAX   Used for:  Benign prostatic hyperplasia, presence of lower urinary tract symptoms unspecified        Dose:  0.8 mg   Take 2 capsules (0.8 mg) by mouth At Bedtime   Quantity:  180 capsule   Refills:  1            Where to get your medicines      These medications were sent to [facility] - [address]  [address], [place] [address]     Phone:  [phone]     ibuprofen 600 MG tablet                Protect others around you: Learn how to safely use, store and throw away your medicines at www.disposemymeds.org.             Medication List: This is a list of all your medications and when to take them. Check marks below indicate your daily home schedule. Keep this list as a reference.      Medications           Morning Afternoon Evening Bedtime As Needed    ibuprofen 600 MG tablet   Commonly known as:  ADVIL/MOTRIN   Take 1 tablet (600 mg) by mouth every 6 hours as needed for other (For mild pain and temperature greater than 102F)                                LUPRON DEPOT (4-MONTH) IM                                tamsulosin 0.4 MG capsule   Commonly known as:  FLOMAX   Take 2 capsules (0.8 mg) by mouth At Bedtime

## 2018-09-27 NOTE — OR NURSING
PNDS met, po per I&O sheet. Pt dressed, up in recliner and transported to Phase 2.  Oro leg bag in place.  Patient does not need Oro teaching; has had long term Oro Catheter.

## 2018-09-27 NOTE — ANESTHESIA PREPROCEDURE EVALUATION
Anesthesia Evaluation     .             ROS/MED HX    ENT/Pulmonary:      (-) sleep apnea   Neurologic:       Cardiovascular:     (+) hypertension----. : . . . :. .       METS/Exercise Tolerance:     Hematologic:         Musculoskeletal:         GI/Hepatic:        (-) GERD   Renal/Genitourinary:     (+) chronic renal disease, type: CRI, Pt does not require dialysis, Pt has no history of transplant, BPH, Other Renal/ Genitourinary, Prostate CA      Endo:     (+) type II DM Last HgA1c: 6.5 Not using insulin Obesity, .      Psychiatric:         Infectious Disease:         Malignancy:         Other:                     Physical Exam  Normal systems: cardiovascular, pulmonary and dental    Airway   Mallampati: II  TM distance: >3 FB  Neck ROM: full    Dental     Cardiovascular       Pulmonary                     Anesthesia Plan      History & Physical Review  History and physical reviewed and following examination; no interval change.    ASA Status:  3 .    NPO Status:  > 8 hours    Plan for General and LMA with Intravenous induction. Maintenance will be Balanced.    PONV prophylaxis:  Ondansetron (or other 5HT-3) and Dexamethasone or Solumedrol       Postoperative Care  Postoperative pain management:  IV analgesics.      Consents  Anesthetic plan, risks, benefits and alternatives discussed with:  Patient..            Procedure: Procedure(s):  CYSTOSCOPY FLEXIBLE, CRYOABLATION PROSTATE  Preop diagnosis: PROSTATE CANCER    Allergies   Allergen Reactions     No Known Allergies      Past Medical History:   Diagnosis Date     Benign essential hypertension      BPH (benign prostatic hyperplasia)     flomax      Calculus of kidney 2002    with lithrotripsy      Diabetes mellitus (H)      Melanoma (H)      Obese      Osteoarthritis, knee     has had synvisc type shot      Osteomyelitis (H)     toe amputation     Past Surgical History:   Procedure Laterality Date     AMPUTATION of toe       COLONOSCOPY       GENITOURINARY  SURGERY Left     Nephrectomy     HERNIA REPAIR      Umbilical hernia      melanoma removal       Social History   Substance Use Topics     Smoking status: Former Smoker     Quit date: 1/1/1970     Smokeless tobacco: Never Used     Alcohol use 0.0 - 0.6 oz/week     0 - 1 Standard drinks or equivalent per week      Comment: rare     Prior to Admission medications    Medication Sig Start Date End Date Taking? Authorizing Provider   Leuprolide Acetate, 4 Month, (LUPRON DEPOT, 4-MONTH, IM)    Yes Reported, Patient   blood glucose monitoring (RELION ULTIMA TEST) test strip Use to test blood sugar 1 times daily or as directed.  Ok to substitute alternative if insurance prefers. 2/3/17   Errol Maldonado MD   Blood Glucose Monitoring Suppl (RELION ULTIMA GLUCOSE SYSTEM) W/DEVICE KIT 1 kit daily Use to test blood sugars 1 times daily or as directed.  Ok to substitute alternative if insurance prefers. 1/20/16   Meliza De La Cruz APRN CNP   Blood Pressure Monitoring KIT Use as directed 5/18/18   Errol Maldonado MD   RELION ULTRA THIN PLUS LANCETS MISC 1 lancet daily Use to test blood sugars 1 times daily or as directed.  Ok to substitute alternative if insurance prefers. 2/3/17   Errol Maldonado MD   tamsulosin (FLOMAX) 0.4 MG capsule Take 2 capsules (0.8 mg) by mouth At Bedtime 5/3/18   Errol Maldonado MD     Current Facility-Administered Medications Ordered in Epic   Medication Dose Route Frequency Last Rate Last Dose     ceFAZolin (ANCEF) intermittent infusion 3 g (pre-mix)  3 g Intravenous Pre-Op/Pre-procedure x 1 dose         No current [facility]-ordered outpatient prescriptions on file.       Wt Readings from Last 1 Encounters:   09/27/18 118.4 kg (261 lb)     Temp Readings from Last 1 Encounters:   09/27/18 36.6  C (97.8  F) (Oral)     BP Readings from Last 6 Encounters:   09/27/18 119/73   09/14/18 108/68   05/18/18 117/73   05/03/18 (!) 154/100    02/21/18 104/74   08/10/17 124/77     Pulse Readings from Last 4 Encounters:   09/14/18 107   05/18/18 84   05/03/18 83   02/21/18 83     Resp Readings from Last 1 Encounters:   09/27/18 20     SpO2 Readings from Last 1 Encounters:   09/27/18 96%     Recent Labs   Lab Test  09/14/18   1044 05/13/18 05/10/18  05/03/18   0956   NA  139   --    --   143   POTASSIUM  4.8  4.4   --   4.8   CHLORIDE  106   --    --   109   CO2  25   --    --   27   ANIONGAP  8   --    --   7   GLC  128*   --   116*  128*   BUN  36*   --    --   42*   CR  1.55*  1.95*   --   2.18*   RADHA  9.1   --    --   9.6     Recent Labs   Lab Test  07/20/16   1206 02/03/15   AST  21  23   ALT  31  18   ALKPHOS  91  48   BILITOTAL  0.5  0.5     Recent Labs   Lab Test  09/14/18   1044  10/27/16   0941   WBC  29.4*  7.8   HGB  11.8*  14.6   PLT  273  250     No results for input(s): ABO, RH in the last 04668 hours.  Recent Labs   Lab Test 05/10/18   INR  1.1      No results for input(s): TROPI in the last 91309 hours.  No results for input(s): PH, PCO2, PO2, HCO3 in the last 92352 hours.  No results for input(s): HCG in the last 61125 hours.  No results found for this or any previous visit (from the past 744 hour(s)).    RECENT LABS:   ECG:   ECHO:                   .

## 2018-09-27 NOTE — OR NURSING
PATIENT DIAGNOSED WITH PROSTATE CANCER IN MAY OF 2018.  UNDERGOING CHEMOTHERAPHY. . VINCENT CATHETER INDWELLING; LEG BAG ON.

## 2018-09-27 NOTE — ANESTHESIA POSTPROCEDURE EVALUATION
Patient: Ross Caballero    Procedure(s):  FLEXIBLE CYSTOSCOPY, CRYOABLATION OF THE PROSTATE  - Wound Class: I-Clean    Diagnosis:PROSTATE CANCER  Diagnosis Additional Information: No value filed.    Anesthesia Type:  General, LMA    Note:  Anesthesia Post Evaluation    Patient location during evaluation: bedside  Patient participation: Able to fully participate in evaluation  Level of consciousness: awake  Pain management: adequate  Airway patency: patent  Cardiovascular status: acceptable  Respiratory status: acceptable  Hydration status: acceptable  PONV: none     Anesthetic complications: None    Comments: No anesthetic complications noted.         Last vitals:  Vitals:    09/27/18 1345 09/27/18 1400 09/27/18 1415   BP: 119/73 116/75 133/73   Resp: 11 19 19   Temp: 35.7  C (96.2  F)  35.7  C (96.3  F)   SpO2: 99% 99% 95%         Electronically Signed By: Ross Gonzalez DO, DO  September 27, 2018  2:23 PM

## 2018-09-27 NOTE — ANESTHESIA CARE TRANSFER NOTE
Patient: Ross Caballero    Procedure(s):  FLEXIBLE CYSTOSCOPY, CRYOABLATION OF THE PROSTATE  - Wound Class: I-Clean    Diagnosis: PROSTATE CANCER  Diagnosis Additional Information: No value filed.    Anesthesia Type:   General, LMA     Note:  Airway :Face Mask  Patient transferred to:PACU  Comments: Pt exhibits spontaneous respirations, follows commands, suctioned, LMA removed, exchanging well, transferred to pacu with O2 @ 10L via mask, all monitors and alarms on, report to RN, VSS.Handoff Report: Identifed the Patient, Identified the Reponsible Provider, Reviewed the pertinent medical history, Discussed the surgical course, Reviewed Intra-OP anesthesia mangement and issues during anesthesia, Set expectations for post-procedure period and Allowed opportunity for questions and acknowledgement of understanding      Vitals: (Last set prior to Anesthesia Care Transfer)    CRNA VITALS  9/27/2018 1211 - 9/27/2018 1254      9/27/2018             Pulse: 88    SpO2: 99 %    Resp Rate (set): 10                Electronically Signed By: JAIME Vilchis CRNA  September 27, 2018  12:54 PM

## 2018-09-27 NOTE — IP AVS SNAPSHOT
[place] [address]    [place] [address]    Phone:  [phone]                                       After Visit Summary   9/27/2018    Ross Caballero    MRN: [id number]           After Visit Summary Signature Page     I have received my discharge instructions, and my questions have been answered. I have discussed any challenges I see with this plan with the nurse or doctor.    ..........................................................................................................................................  Patient/Patient Representative Signature      ..........................................................................................................................................  Patient Representative Print Name and Relationship to Patient    ..................................................               ................................................  Date                                   Time    ..........................................................................................................................................  Reviewed by Signature/Title    ...................................................              ..............................................  Date                                               Time          22EPIC Rev 08/18

## 2018-09-27 NOTE — DISCHARGE INSTRUCTIONS
Same Day Surgery Discharge Instructions for  Sedation and General Anesthesia       It's not unusual to feel dizzy, light-headed or faint for up to 24 hours after surgery or while taking pain medication.  If you have these symptoms: sit for a few minutes before standing and have someone assist you when you get up to walk or use the bathroom.      You should rest and relax for the next 24 hours. We recommend you make arrangements to have an adult stay with you for at least 24 hours after your discharge.  Avoid hazardous and strenuous activity.      DO NOT DRIVE any vehicle or operate mechanical equipment for 24 hours following the end of your surgery.  Even though you may feel normal, your reactions may be affected by the medication you have received.      Do not drink alcoholic beverages for 24 hours following surgery.       Slowly progress to your regular diet as you feel able. It's not unusual to feel nauseated and/or vomit after receiving anesthesia.  If you develop these symptoms, drink clear liquids (apple juice, ginger ale, broth, 7-up, etc. ) until you feel better.  If your nausea and vomiting persists for 24 hours, please notify your surgeon.        All narcotic pain medications, along with inactivity and anesthesia, can cause constipation. Drinking plenty of liquids and increasing fiber intake will help.      For any questions of a medical nature, call your surgeon.      Do not make important decisions for 24 hours.      If you had general anesthesia, you may have a sore throat for a couple of days related to the breathing tube used during surgery.  You may use Cepacol lozenges to help with this discomfort.  If it worsens or if you develop a fever, contact your surgeon.       If you feel your pain is not well managed with the pain medications prescribed by your surgeon, please contact your surgeon's office to let them know so they can address your concerns.           Discharge Instructions following  Cryoblation of the Prostate  [facility] [place]    Diet:    Diet as tolerated.  Drink at least 6 glasses of liquid per day.  Activity:    No heavy lifting or strenuous activity until approved by surgeon.    Short walks and stair climbing are permissible.  Care After Surgery    Do not hold urine in your bladder.  Always empty your bladder when you have the urge to urinate.    Do not strain to have a bowel movement.  If constipated, take over-the-counter stool softeners (follow directions on package).    Do not drive a car or have intercourse until approved by your surgeon.    It is not unusual to pass small clots or to have red-tinged urine.  If this occurs, decrease activity and increase your fluid intake.  o You may expect to have some blood for at least 3-4 weeks, especially at the beginning or end of urination.  If there is dark, thick blood with difficulty urinating call your surgeon.        **If you have questions or concerns about your procedure,   Call Dr. Calvert at [phone]**

## 2018-11-01 ENCOUNTER — TRANSFERRED RECORDS (OUTPATIENT)
Dept: HEALTH INFORMATION MANAGEMENT | Facility: CLINIC | Age: 78
End: 2018-11-01

## 2018-12-17 DIAGNOSIS — N40.0 BENIGN PROSTATIC HYPERPLASIA, PRESENCE OF LOWER URINARY TRACT SYMPTOMS UNSPECIFIED: ICD-10-CM

## 2018-12-19 NOTE — TELEPHONE ENCOUNTER
"tamsulosin (FLOMAX) 0.4 MG capsule  Last Written Prescription Date:  5/3/18  Last Fill Quantity: 180,  # refills: 1   Last office visit: 9/14/2018 with prescribing provider:  damaris   Future Office Visit:          Requested Prescriptions   Pending Prescriptions Disp Refills     tamsulosin (FLOMAX) 0.4 MG capsule 180 capsule 1     Sig: Take 2 capsules (0.8 mg) by mouth At Bedtime    Alpha Blockers Passed - 12/17/2018  5:47 PM       Passed - Blood pressure under 140/90 in past 12 months    BP Readings from Last 3 Encounters:   09/27/18 132/78   09/14/18 108/68   05/18/18 117/73                Passed - Recent (12 mo) or future (30 days) visit within the authorizing provider's specialty    Patient had office visit in the last 12 months or has a visit in the next 30 days with authorizing provider or within the authorizing provider's specialty.  See \"Patient Info\" tab in inbasket, or \"Choose Columns\" in Meds & Orders section of the refill encounter.             Passed - Patient does not have Tadalafil, Vardenafil, or Sildenafil on their medication list       Passed - Patient is 18 years of age or older          "

## 2018-12-21 RX ORDER — TAMSULOSIN HYDROCHLORIDE 0.4 MG/1
0.8 CAPSULE ORAL AT BEDTIME
Qty: 180 CAPSULE | Refills: 1 | Status: SHIPPED | OUTPATIENT
Start: 2018-12-21 | End: 2019-05-29

## 2019-02-01 ENCOUNTER — TRANSFERRED RECORDS (OUTPATIENT)
Dept: HEALTH INFORMATION MANAGEMENT | Facility: CLINIC | Age: 79
End: 2019-02-01

## 2019-02-20 ENCOUNTER — TRANSFERRED RECORDS (OUTPATIENT)
Dept: HEALTH INFORMATION MANAGEMENT | Facility: CLINIC | Age: 79
End: 2019-02-20

## 2019-05-16 ENCOUNTER — TRANSFERRED RECORDS (OUTPATIENT)
Dept: HEALTH INFORMATION MANAGEMENT | Facility: CLINIC | Age: 79
End: 2019-05-16

## 2019-05-22 ENCOUNTER — TRANSFERRED RECORDS (OUTPATIENT)
Dept: HEALTH INFORMATION MANAGEMENT | Facility: CLINIC | Age: 79
End: 2019-05-22

## 2019-05-28 DIAGNOSIS — N40.0 BENIGN PROSTATIC HYPERPLASIA, PRESENCE OF LOWER URINARY TRACT SYMPTOMS UNSPECIFIED: ICD-10-CM

## 2019-05-29 RX ORDER — TAMSULOSIN HYDROCHLORIDE 0.4 MG/1
0.8 CAPSULE ORAL AT BEDTIME
Qty: 180 CAPSULE | Refills: 0 | Status: SHIPPED | OUTPATIENT
Start: 2019-05-29 | End: 2019-09-10

## 2019-05-29 NOTE — TELEPHONE ENCOUNTER
Prescription approved per [facility] Refill Protocol.  Elizabeth KWAN RN

## 2019-05-29 NOTE — TELEPHONE ENCOUNTER
"Last Written Prescription Date:  12/21/2018  Last Fill Quantity: 180,  # refills: 1   Last office visit: 9/14/2018 with prescribing provider:  Joel   Future Office Visit:      Requested Prescriptions   Pending Prescriptions Disp Refills     tamsulosin (FLOMAX) 0.4 MG capsule 180 capsule 1     Sig: Take 2 capsules (0.8 mg) by mouth At Bedtime       Alpha Blockers Passed - 5/28/2019  7:52 PM        Passed - Blood pressure under 140/90 in past 12 months     BP Readings from Last 3 Encounters:   09/27/18 132/78   09/14/18 108/68   05/18/18 117/73                 Passed - Recent (12 mo) or future (30 days) visit within the authorizing provider's specialty     Patient had office visit in the last 12 months or has a visit in the next 30 days with authorizing provider or within the authorizing provider's specialty.  See \"Patient Info\" tab in inbasket, or \"Choose Columns\" in Meds & Orders section of the refill encounter.              Passed - Patient does not have Tadalafil, Vardenafil, or Sildenafil on their medication list        Passed - Medication is active on med list        Passed - Patient is 18 years of age or older          "

## 2019-08-28 ENCOUNTER — TRANSFERRED RECORDS (OUTPATIENT)
Dept: HEALTH INFORMATION MANAGEMENT | Facility: CLINIC | Age: 79
End: 2019-08-28

## 2019-09-10 DIAGNOSIS — N40.0 BENIGN PROSTATIC HYPERPLASIA, PRESENCE OF LOWER URINARY TRACT SYMPTOMS UNSPECIFIED: ICD-10-CM

## 2019-09-10 NOTE — TELEPHONE ENCOUNTER
"tamsulosin (FLOMAX) 0.4 MG capsule    Last Written Prescription Date:  05/29/2019  Last Fill Quantity: 180,  # refills: 0   Last office visit: 9/14/2018 with prescribing provider:  Joel   Future Office Visit:  Unknown     Requested Prescriptions   Pending Prescriptions Disp Refills     tamsulosin (FLOMAX) 0.4 MG capsule 180 capsule 0     Sig: Take 2 capsules (0.8 mg) by mouth At Bedtime - Overdue for fasting office visit with Dr Maldonado, call to schedule ASAP       Alpha Blockers Passed - 9/10/2019 11:09 AM        Passed - Blood pressure under 140/90 in past 12 months     BP Readings from Last 3 Encounters:   09/27/18 132/78   09/14/18 108/68   05/18/18 117/73                 Passed - Recent (12 mo) or future (30 days) visit within the authorizing provider's specialty     Patient had office visit in the last 12 months or has a visit in the next 30 days with authorizing provider or within the authorizing provider's specialty.  See \"Patient Info\" tab in inbasket, or \"Choose Columns\" in Meds & Orders section of the refill encounter.              Passed - Patient does not have Tadalafil, Vardenafil, or Sildenafil on their medication list        Passed - Medication is active on med list        Passed - Patient is 18 years of age or older          "

## 2019-09-11 RX ORDER — TAMSULOSIN HYDROCHLORIDE 0.4 MG/1
0.8 CAPSULE ORAL AT BEDTIME
Qty: 180 CAPSULE | Refills: 0 | Status: SHIPPED | OUTPATIENT
Start: 2019-09-11 | End: 2019-12-09

## 2019-09-11 NOTE — TELEPHONE ENCOUNTER
Left message on VM (personal greeting).  Advised calling back asap to schedule a fasting physical with  prior to next refills.    Jolene Ragsdale RN on 9/11/2019 at 2:01 PM

## 2019-10-18 ENCOUNTER — OFFICE VISIT (OUTPATIENT)
Dept: FAMILY MEDICINE | Facility: CLINIC | Age: 79
End: 2019-10-18
Payer: MEDICARE

## 2019-10-18 VITALS
HEART RATE: 70 BPM | BODY MASS INDEX: 41.28 KG/M2 | HEIGHT: 69 IN | TEMPERATURE: 97.7 F | WEIGHT: 278.7 LBS | SYSTOLIC BLOOD PRESSURE: 121 MMHG | OXYGEN SATURATION: 96 % | DIASTOLIC BLOOD PRESSURE: 79 MMHG

## 2019-10-18 DIAGNOSIS — E66.01 MORBID OBESITY (H): ICD-10-CM

## 2019-10-18 DIAGNOSIS — Z13.89 SCREENING FOR DIABETIC PERIPHERAL NEUROPATHY: ICD-10-CM

## 2019-10-18 DIAGNOSIS — N18.30 TYPE 2 DIABETES MELLITUS WITH STAGE 3 CHRONIC KIDNEY DISEASE, WITHOUT LONG-TERM CURRENT USE OF INSULIN (H): ICD-10-CM

## 2019-10-18 DIAGNOSIS — C61 PROSTATE CANCER (H): ICD-10-CM

## 2019-10-18 DIAGNOSIS — E55.9 VITAMIN D DEFICIENCY: ICD-10-CM

## 2019-10-18 DIAGNOSIS — Z00.00 MEDICARE ANNUAL WELLNESS VISIT, SUBSEQUENT: Primary | ICD-10-CM

## 2019-10-18 DIAGNOSIS — E11.22 TYPE 2 DIABETES MELLITUS WITH STAGE 3 CHRONIC KIDNEY DISEASE, WITHOUT LONG-TERM CURRENT USE OF INSULIN (H): ICD-10-CM

## 2019-10-18 LAB
ALBUMIN SERPL-MCNC: 3.7 G/DL (ref 3.4–5)
ALP SERPL-CCNC: 55 U/L (ref 40–150)
ALT SERPL W P-5'-P-CCNC: 24 U/L (ref 0–70)
ANION GAP SERPL CALCULATED.3IONS-SCNC: 6 MMOL/L (ref 3–14)
AST SERPL W P-5'-P-CCNC: 14 U/L (ref 0–45)
BASOPHILS # BLD AUTO: 0 10E9/L (ref 0–0.2)
BASOPHILS NFR BLD AUTO: 0.2 %
BILIRUB SERPL-MCNC: 0.4 MG/DL (ref 0.2–1.3)
BUN SERPL-MCNC: 39 MG/DL (ref 7–30)
CALCIUM SERPL-MCNC: 9.4 MG/DL (ref 8.5–10.1)
CHLORIDE SERPL-SCNC: 104 MMOL/L (ref 94–109)
CHOLEST SERPL-MCNC: 199 MG/DL
CO2 SERPL-SCNC: 27 MMOL/L (ref 20–32)
CREAT SERPL-MCNC: 1.6 MG/DL (ref 0.66–1.25)
CREAT UR-MCNC: 116 MG/DL
DEPRECATED CALCIDIOL+CALCIFEROL SERPL-MC: 24 UG/L (ref 20–75)
DIFFERENTIAL METHOD BLD: ABNORMAL
EOSINOPHIL # BLD AUTO: 0.2 10E9/L (ref 0–0.7)
EOSINOPHIL NFR BLD AUTO: 3.2 %
ERYTHROCYTE [DISTWIDTH] IN BLOOD BY AUTOMATED COUNT: 13.6 % (ref 10–15)
GFR SERPL CREATININE-BSD FRML MDRD: 40 ML/MIN/{1.73_M2}
GLUCOSE SERPL-MCNC: 128 MG/DL (ref 70–99)
HBA1C MFR BLD: 6.4 % (ref 0–5.6)
HCT VFR BLD AUTO: 41.7 % (ref 40–53)
HDLC SERPL-MCNC: 49 MG/DL
HGB BLD-MCNC: 14.2 G/DL (ref 13.3–17.7)
LDLC SERPL CALC-MCNC: 85 MG/DL
LYMPHOCYTES # BLD AUTO: 1.7 10E9/L (ref 0.8–5.3)
LYMPHOCYTES NFR BLD AUTO: 25.9 %
MCH RBC QN AUTO: 32.7 PG (ref 26.5–33)
MCHC RBC AUTO-ENTMCNC: 34.1 G/DL (ref 31.5–36.5)
MCV RBC AUTO: 96 FL (ref 78–100)
MICROALBUMIN UR-MCNC: 298 MG/L
MICROALBUMIN/CREAT UR: 256.9 MG/G CR (ref 0–17)
MONOCYTES # BLD AUTO: 0.5 10E9/L (ref 0–1.3)
MONOCYTES NFR BLD AUTO: 7.1 %
NEUTROPHILS # BLD AUTO: 4.1 10E9/L (ref 1.6–8.3)
NEUTROPHILS NFR BLD AUTO: 63.6 %
NONHDLC SERPL-MCNC: 150 MG/DL
PLATELET # BLD AUTO: 215 10E9/L (ref 150–450)
POTASSIUM SERPL-SCNC: 5.1 MMOL/L (ref 3.4–5.3)
PROT SERPL-MCNC: 7.2 G/DL (ref 6.8–8.8)
RBC # BLD AUTO: 4.34 10E12/L (ref 4.4–5.9)
SODIUM SERPL-SCNC: 137 MMOL/L (ref 133–144)
TRIGL SERPL-MCNC: 323 MG/DL
WBC # BLD AUTO: 6.5 10E9/L (ref 4–11)

## 2019-10-18 PROCEDURE — 80061 LIPID PANEL: CPT | Performed by: INTERNAL MEDICINE

## 2019-10-18 PROCEDURE — 99213 OFFICE O/P EST LOW 20 MIN: CPT | Mod: 25 | Performed by: INTERNAL MEDICINE

## 2019-10-18 PROCEDURE — 80053 COMPREHEN METABOLIC PANEL: CPT | Performed by: INTERNAL MEDICINE

## 2019-10-18 PROCEDURE — 82306 VITAMIN D 25 HYDROXY: CPT | Performed by: INTERNAL MEDICINE

## 2019-10-18 PROCEDURE — 82043 UR ALBUMIN QUANTITATIVE: CPT | Performed by: INTERNAL MEDICINE

## 2019-10-18 PROCEDURE — 85025 COMPLETE CBC W/AUTO DIFF WBC: CPT | Performed by: INTERNAL MEDICINE

## 2019-10-18 PROCEDURE — 36415 COLL VENOUS BLD VENIPUNCTURE: CPT | Performed by: INTERNAL MEDICINE

## 2019-10-18 PROCEDURE — 83036 HEMOGLOBIN GLYCOSYLATED A1C: CPT | Performed by: INTERNAL MEDICINE

## 2019-10-18 PROCEDURE — G0438 PPPS, INITIAL VISIT: HCPCS | Performed by: INTERNAL MEDICINE

## 2019-10-18 RX ORDER — BLOOD SUGAR DIAGNOSTIC
1 STRIP MISCELLANEOUS 2 TIMES DAILY
Qty: 100 EACH | Refills: 3 | Status: SHIPPED | OUTPATIENT
Start: 2019-10-18

## 2019-10-18 RX ORDER — ACETAMINOPHEN 325 MG/1
325-650 TABLET ORAL EVERY 6 HOURS PRN
COMMUNITY

## 2019-10-18 ASSESSMENT — ENCOUNTER SYMPTOMS
LIGHT-HEADEDNESS: 0
DIZZINESS: 0
SLEEP DISTURBANCE: 0
NUMBNESS: 1
HEADACHES: 0
SORE THROAT: 0
ARTHRALGIAS: 0
DIARRHEA: 0
SINUS PAIN: 0
DIFFICULTY URINATING: 0
NECK PAIN: 0
CHILLS: 0
COUGH: 0
PALPITATIONS: 0
BLOOD IN STOOL: 0
EYE PAIN: 0
FATIGUE: 0
RHINORRHEA: 1
CONSTIPATION: 1
SHORTNESS OF BREATH: 0
WEAKNESS: 0
DYSPHORIC MOOD: 0
MYALGIAS: 0
BACK PAIN: 0
NERVOUS/ANXIOUS: 0
NAUSEA: 0
SINUS PRESSURE: 0
TROUBLE SWALLOWING: 0
FEVER: 0
ABDOMINAL PAIN: 0
VOMITING: 0
HALLUCINATIONS: 0

## 2019-10-18 ASSESSMENT — MIFFLIN-ST. JEOR: SCORE: 1969.55

## 2019-10-18 ASSESSMENT — ACTIVITIES OF DAILY LIVING (ADL): CURRENT_FUNCTION: NO ASSISTANCE NEEDED

## 2019-10-18 NOTE — PROGRESS NOTES
"SUBJECTIVE:   Ross Caballero is a 79 year old male who presents for Preventive Visit.      Patient is able to control his diabetes through diet.  He checks his blood glucose at home infrequently.    Continues to be treated for prostate cancer with Lupron.  Denies any urinary symptoms.    Eye Exam: Feb 2019, Donya Montillaa, no diabetic changes      Are you in the first 12 months of your Medicare coverage?  No    Healthy Habits:     In general, how would you rate your overall health?  Fair    Frequency of exercise:  2-3 days/week    Duration of exercise:  30-45 minutes    Do you usually eat at least 4 servings of fruit and vegetables a day, include whole grains    & fiber and avoid regularly eating high fat or \"junk\" foods?  Yes    Taking medications regularly:  Yes    Barriers to taking medications:  None    Medication side effects:  None    Ability to successfully perform activities of daily living:  No assistance needed    Home Safety:  No safety concerns identified    Hearing Impairment:  No hearing concerns    In the past 6 months, have you been bothered by leaking of urine? Yes    In general, how would you rate your overall mental or emotional health?  Excellent      PHQ-2 Total Score: 0    Additional concerns today:  Yes    Do you feel safe in your environment? Yes     Do you have a Health Care Directive? No: Advance care planning reviewed with patient; information given to patient to review.      Cognitive Screening   1) Repeat 3 items (Leader, Season, Table)    2) Clock draw: NORMAL  3) 3 item recall: Recalls 2 objects   Results: NORMAL clock, 1-2 items recalled: COGNITIVE IMPAIRMENT LESS LIKELY    Mini-CogTM Copyright JAIME Amezcua. Licensed by the author for use in [facility]; reprinted with permission (renee@.[facility]). All rights reserved.      Do you have sleep apnea, excessive snoring or daytime drowsiness?: no    Reviewed and updated as needed this visit by clinical staff  Tobacco  Allergies  " Meds  Problems  Med Hx  Surg Hx  Fam Hx       Reviewed and updated as needed this visit by Provider  Allergies  Meds  Problems  Med Hx  Surg Hx        Social History     Tobacco Use     Smoking status: Former Smoker     Last attempt to quit: 1970     Years since quittin.8     Smokeless tobacco: Never Used   Substance Use Topics     Alcohol use: Yes     Alcohol/week: 0.0 - 1.0 standard drinks     Comment: rare       Current providers sharing in care for this patient include:   Patient Care Team:  Errol Maldonado MD as PCP - General (Internal Medicine)  Errol Maldonado MD as Assigned PCP    The following health maintenance items are reviewed in Epic and correct as of today:  Health Maintenance   Topic Date Due     ZOSTER IMMUNIZATION (1 of 2) 1990     MEDICARE ANNUAL WELLNESS VISIT  2005     PNEUMOCOCCAL IMMUNIZATION 65+ LOW/MEDIUM RISK (2 of 2 - PCV13) 2012     A1C  2019     MICROALBUMIN  2019     BMP  2019     EYE EXAM  2020     LIPID  10/18/2020     DIABETIC FOOT EXAM  10/18/2020     FALL RISK ASSESSMENT  10/18/2020     TSH W/FREE T4 REFLEX  10/18/2021     ADVANCE CARE PLANNING  10/27/2021     DTAP/TDAP/TD IMMUNIZATION (2 - Td) 2022     PHQ-2  Completed     INFLUENZA VACCINE  Addressed     IPV IMMUNIZATION  Aged Out     MENINGITIS IMMUNIZATION  Aged Out       Review of Systems   Constitutional: Negative for chills, fatigue and fever.   HENT: Positive for rhinorrhea (whenever he's eating). Negative for congestion, ear pain, hearing loss, sinus pressure, sinus pain, sore throat and trouble swallowing.    Eyes: Negative for pain and visual disturbance.   Respiratory: Negative for cough and shortness of breath.    Cardiovascular: Negative for chest pain, palpitations and leg swelling.   Gastrointestinal: Positive for constipation. Negative for abdominal pain, blood in stool, diarrhea, nausea and vomiting.  "  Genitourinary: Negative for difficulty urinating and testicular pain.   Musculoskeletal: Negative for arthralgias, back pain, myalgias and neck pain.   Skin: Negative for rash.   Neurological: Positive for numbness (at feet). Negative for dizziness, weakness, light-headedness and headaches.   Psychiatric/Behavioral: Negative for dysphoric mood, hallucinations and sleep disturbance. The patient is not nervous/anxious.        OBJECTIVE:   /79 (BP Location: Right arm, Patient Position: Sitting, Cuff Size: Adult Regular)   Pulse 70   Temp 97.7  F (36.5  C) (Tympanic)   Ht 1.753 m (5' 9\")   Wt 126.4 kg (278 lb 11.2 oz)   SpO2 96%   BMI 41.16 kg/m   Estimated body mass index is 41.16 kg/m  as calculated from the following:    Height as of this encounter: 1.753 m (5' 9\").    Weight as of this encounter: 126.4 kg (278 lb 11.2 oz).      Physical Exam  Vitals signs and nursing note reviewed.   Constitutional:       General: He is not in acute distress.  HENT:      Right Ear: Tympanic membrane and external ear normal.      Left Ear: Tympanic membrane and external ear normal.   Eyes:      Conjunctiva/sclera: Conjunctivae normal.      Pupils: Pupils are equal, round, and reactive to light.   Neck:      Thyroid: No thyromegaly.      Vascular: No carotid bruit.   Cardiovascular:      Rate and Rhythm: Normal rate and regular rhythm.      Heart sounds: Normal heart sounds.   Pulmonary:      Effort: Pulmonary effort is normal. No respiratory distress.      Breath sounds: Normal breath sounds.   Abdominal:      Palpations: Abdomen is soft.      Tenderness: There is no tenderness.   Genitourinary:     Penis: No discharge.    Musculoskeletal: Normal range of motion.         General: No tenderness.      Right lower leg: No edema.      Left lower leg: No edema.   Lymphadenopathy:      Cervical: No cervical adenopathy.   Skin:     Findings: No rash.   Neurological:      Mental Status: He is alert and oriented to person, " place, and time.      Coordination: Coordination normal.   Psychiatric:         Mood and Affect: Mood normal.         Behavior: Behavior normal.     Diabetic Foot Screen:  Any complaints of increased pain or numbness ?  YES   Is there a foot ulcer now or a history of foot ulcer? No  Does the foot have an abnormal shape? No  Are the nails thick, too long or ingrown?  YES some thick toenails  Are there any redness or open areas? No         Sensation Testing done at all points on the diagram with monofilament     Right Foot: Sensation Absent at the following points , 1, 3, 4, 5, 6, 7, 8, 9 and 10  Left Foot: Sensation Absent at the following points , 1, 2, 3, 4, 5, 6, 7, 8 and 9     Risk Category: 2- Loss of protective sensation with weakness, deformity, pre-ulcer or callous but no ulceration  Performed by Errol Maldonado MD        ASSESSMENT / PLAN:       ICD-10-CM    1. Medicare annual wellness visit, subsequent Z00.00 CBC with platelets differential     Comprehensive metabolic panel   2. Type 2 diabetes mellitus with stage 3 chronic kidney disease, without long-term current use of insulin (H) E11.22 Alcohol Swabs (ALCOHOL PADS) 70 % PADS    N18.3 blood glucose monitoring (NO BRAND SPECIFIED) meter device kit     blood glucose (NO BRAND SPECIFIED) test strip     blood glucose (NO BRAND SPECIFIED) lancets standard     Albumin Random Urine Quantitative with Creat Ratio     Lipid panel reflex to direct LDL Fasting     Hemoglobin A1c   3. Screening for diabetic peripheral neuropathy Z13.89 See patient instructions below     4. Prostate cancer (H) C61 Patient on Lupron   5. Morbid obesity (H) E66.01    6. Vitamin D deficiency E55.9 Vitamin D Deficiency       Patient Instructions   Monitor your blood sugar twice a day (before breakfast and before dinner) and record in a small notebook (always bring this notebook with you during you clinic visits).  Call doctor if your blood sugar readings are not consistently  "between 100-140, or if they are greater than 200 or less than 80.    Monitor your blood pressure once a week.  Call doctor if:  -- your blood pressure for the top/upper number is greater than 140 or less than 90  -- your blood pressure for the bottom/lower number is greater than 90 or less than 60    Maintain low fat/calorie diet and regular exercise.    Check your feet every day and report any skin breaks, wounds, redness, swelling, or pain to the doctor.    Follow up in 6 months.      End of Life Planning:  Patient currently has an advanced directive: Yes.  Practitioner is supportive of decision.    COUNSELING:  Reviewed preventive health counseling, as reflected in patient instructions    Estimated body mass index is 41.16 kg/m  as calculated from the following:    Height as of this encounter: 1.753 m (5' 9\").    Weight as of this encounter: 126.4 kg (278 lb 11.2 oz).    Weight management plan: Discussed healthy diet and exercise guidelines     reports that he quit smoking about 49 years ago. He has never used smokeless tobacco.      Appropriate preventive services were discussed with this patient, including applicable screening as appropriate for cardiovascular disease, diabetes, osteopenia/osteoporosis, and glaucoma.  As appropriate for age/gender, discussed screening for colorectal cancer, prostate cancer, breast cancer, and cervical cancer. Checklist reviewing preventive services available has been given to the patient.    Reviewed patients plan of care and provided an AVS. The Basic Care Plan (routine screening as documented in Health Maintenance) for Ross meets the Care Plan requirement. This Care Plan has been established and reviewed with the Patient.    Counseling Resources:  ATP IV Guidelines  Pooled Cohorts Equation Calculator  Breast Cancer Risk Calculator  FRAX Risk Assessment  ICSI Preventive Guidelines  Dietary Guidelines for Americans, 2010  USDA's MyPlate  ASA Prophylaxis  Lung CA " Screening    Errol Maldonado MD  [facility]    Identified Health Risks:

## 2019-10-18 NOTE — PATIENT INSTRUCTIONS
Monitor your blood sugar twice a day (before breakfast and before dinner) and record in a small notebook (always bring this notebook with you during you clinic visits).  Call doctor if your blood sugar readings are not consistently between 100-140, or if they are greater than 200 or less than 80.    Monitor your blood pressure once a week.  Call doctor if:  -- your blood pressure for the top/upper number is greater than 140 or less than 90  -- your blood pressure for the bottom/lower number is greater than 90 or less than 60    Maintain low fat/calorie diet and regular exercise.    Check your feet every day and report any skin breaks, wounds, redness, swelling, or pain to the doctor.    Follow up in 6 months.

## 2019-11-02 ENCOUNTER — TRANSFERRED RECORDS (OUTPATIENT)
Dept: HEALTH INFORMATION MANAGEMENT | Facility: CLINIC | Age: 79
End: 2019-11-02

## 2019-11-20 ENCOUNTER — TRANSFERRED RECORDS (OUTPATIENT)
Dept: HEALTH INFORMATION MANAGEMENT | Facility: CLINIC | Age: 79
End: 2019-11-20

## 2019-12-06 DIAGNOSIS — N40.0 BENIGN PROSTATIC HYPERPLASIA, PRESENCE OF LOWER URINARY TRACT SYMPTOMS UNSPECIFIED: ICD-10-CM

## 2019-12-07 NOTE — TELEPHONE ENCOUNTER
"Last Written Prescription Date:  9/11/19  Last Fill Quantity: 180 capsule,  # refills: 0   Last office visit: 10/18/2019 with prescribing provider:  Joel   Future Office Visit:      Requested Prescriptions   Pending Prescriptions Disp Refills     tamsulosin (FLOMAX) 0.4 MG capsule 180 capsule 0     Sig: Take 2 capsules (0.8 mg) by mouth At Bedtime - Overdue for fasting office visit with Dr Maldonado, call to schedule ASAP       Alpha Blockers Passed - 12/6/2019  6:56 PM        Passed - Blood pressure under 140/90 in past 12 months     BP Readings from Last 3 Encounters:   10/18/19 121/79   09/27/18 132/78   09/14/18 108/68                 Passed - Recent (12 mo) or future (30 days) visit within the authorizing provider's specialty     Patient has had an office visit with the authorizing provider or a provider within the authorizing providers department within the previous 12 mos or has a future within next 30 days. See \"Patient Info\" tab in inbasket, or \"Choose Columns\" in Meds & Orders section of the refill encounter.              Passed - Patient does not have Tadalafil, Vardenafil, or Sildenafil on their medication list        Passed - Medication is active on med list        Passed - Patient is 18 years of age or older          "

## 2019-12-09 RX ORDER — TAMSULOSIN HYDROCHLORIDE 0.4 MG/1
0.8 CAPSULE ORAL AT BEDTIME
Qty: 180 CAPSULE | Refills: 2 | Status: SHIPPED | OUTPATIENT
Start: 2019-12-09 | End: 2020-10-19

## 2020-03-07 ENCOUNTER — TRANSFERRED RECORDS (OUTPATIENT)
Dept: HEALTH INFORMATION MANAGEMENT | Facility: CLINIC | Age: 80
End: 2020-03-07

## 2020-03-09 ENCOUNTER — OFFICE VISIT (OUTPATIENT)
Dept: FAMILY MEDICINE | Facility: CLINIC | Age: 80
End: 2020-03-09
Payer: MEDICARE

## 2020-03-09 ENCOUNTER — TELEPHONE (OUTPATIENT)
Dept: FAMILY MEDICINE | Facility: CLINIC | Age: 80
End: 2020-03-09

## 2020-03-09 VITALS
HEIGHT: 69 IN | TEMPERATURE: 98.3 F | BODY MASS INDEX: 41.01 KG/M2 | DIASTOLIC BLOOD PRESSURE: 92 MMHG | HEART RATE: 73 BPM | WEIGHT: 276.9 LBS | SYSTOLIC BLOOD PRESSURE: 156 MMHG | OXYGEN SATURATION: 93 %

## 2020-03-09 DIAGNOSIS — M10.9 ACUTE GOUTY ARTHRITIS: ICD-10-CM

## 2020-03-09 DIAGNOSIS — I49.9 IRREGULAR HEART BEAT: Primary | ICD-10-CM

## 2020-03-09 PROCEDURE — 99214 OFFICE O/P EST MOD 30 MIN: CPT | Performed by: INTERNAL MEDICINE

## 2020-03-09 PROCEDURE — 93000 ELECTROCARDIOGRAM COMPLETE: CPT | Performed by: INTERNAL MEDICINE

## 2020-03-09 RX ORDER — COLCHICINE 0.6 MG/1
TABLET ORAL
Qty: 10 TABLET | Refills: 1 | Status: SHIPPED | OUTPATIENT
Start: 2020-03-09 | End: 2021-07-19

## 2020-03-09 ASSESSMENT — MIFFLIN-ST. JEOR: SCORE: 1961.39

## 2020-03-09 NOTE — TELEPHONE ENCOUNTER
"Spoke with Pt:     S: Lingering URI and Fluid retention     B: Went to orthopedics doctor, was having a problem with his toe- took x-rays, was advised to see PCP     Hx of prostate cancer     A:   URI x2-3 weeks     Pt states when he eats his nose runs and eyes tear     Mucus is white \"like it's coming out my lungs\"     Denies fever now, \"maybe\" had a fever the first week    Breathing is OK at rest, does get winded with activity, and lying flat.     Also overall feeling exhausted     Heart palpitations  \"every once in a while feels like I'm missing a beat\"   Denies chest pains, now  Had some discomfort with URI 2 weeks ago     \"Cold is on it's last leg\"    Pt would have preferred to see PCP, but PCP out of office.     Scheduled with available Provider.     Dasia VIZCAINO RN           "

## 2020-03-09 NOTE — PATIENT INSTRUCTIONS
Use the colchicine and let us know if the toe is not back to normal soon.    The swelling should improve on it's own    Give the cold another week, call if it worsens    Juliano Pereira M.D.

## 2020-03-09 NOTE — PROGRESS NOTES
The patient is here for several issues.    The patient has had a cough for the last 3 weeks with a clear runny nose and runny eyes intermittently.  The cough is somewhat better.  He has not had a fever 1 day only.  No earache or sore throat or sinus pain or pressure.  He has no history of lung disease and he does not smoke.    Patient also has had pain in his left great metacarpal area.  He was seen at urgent care and given prednisone and this did relieve but not completely.  No history of gout but his uric acid was high at the urgent care as noted.  No diet changes.    The patient also feels like he is retaining fluid.  This is been in the last couple of weeks.  No chest pain or shortness of breath or history of heart disease.    The patient's blood pressure is elevated today.  It has been okay in the past.  He is not on any medication for this.    He has had no abdominal pain or nausea or vomiting.    Past Medical History:   Diagnosis Date     Benign essential hypertension      BPH (benign prostatic hyperplasia)     flomax      Calculus of kidney 2002    with lithrotripsy      Diabetes mellitus (H)      Melanoma (H)      Obese      Osteoarthritis, knee     has had synvisc type shot      Osteomyelitis (H)     toe amputation     Prostate cancer (H) 10/18/2019     Pyelonephritis 7/20/2016     Past Surgical History:   Procedure Laterality Date     AMPUTATION of toe       COLONOSCOPY       CYSTOSCOPY FLEXIBLE, CYOABLATION PROSTATE N/A 9/27/2018    Procedure: CYSTOSCOPY FLEXIBLE, CRYOABLATION PROSTATE;  FLEXIBLE CYSTOSCOPY, CRYOABLATION OF THE PROSTATE ;  Surgeon: Nima Calvert MD;  Location: [facility] OR     GENITOURINARY SURGERY Left     Nephrectomy     HERNIA REPAIR      Umbilical hernia      melanoma removal       Social History     Socioeconomic History     Marital status:      Spouse name: Not on file     Number of children: Not on file     Years of education: Not on file     Highest education level: Not  on file   Occupational History     Not on file   Social Needs     Financial resource strain: Not on file     Food insecurity     Worry: Not on file     Inability: Not on file     Transportation needs     Medical: Not on file     Non-medical: Not on file   Tobacco Use     Smoking status: Former Smoker     Last attempt to quit: 1970     Years since quittin.2     Smokeless tobacco: Never Used   Substance and Sexual Activity     Alcohol use: Yes     Alcohol/week: 0.0 - 1.0 standard drinks     Comment: rare     Drug use: No     Sexual activity: Yes     Partners: Female   Lifestyle     Physical activity     Days per week: Not on file     Minutes per session: Not on file     Stress: Not on file   Relationships     Social connections     Talks on phone: Not on file     Gets together: Not on file     Attends Sikhism service: Not on file     Active member of club or organization: Not on file     Attends meetings of clubs or organizations: Not on file     Relationship status: Not on file     Intimate partner violence     Fear of current or ex partner: Not on file     Emotionally abused: Not on file     Physically abused: Not on file     Forced sexual activity: Not on file   Other Topics Concern     Parent/sibling w/ CABG, MI or angioplasty before 65F 55M? Not Asked   Social History Narrative     Not on file     Current Outpatient Medications   Medication Sig Dispense Refill     acetaminophen (TYLENOL) 325 MG tablet Take 325-650 mg by mouth every 6 hours as needed for mild pain Patient reports taking ES Tylenol       Alcohol Swabs (ALCOHOL PADS) 70 % PADS 1 Application 2 times daily 100 each 3     blood glucose (NO BRAND SPECIFIED) lancets standard Use to test blood sugar 2 times daily or as directed. 100 each 3     blood glucose (NO BRAND SPECIFIED) test strip Check blood sugar twice a day 100 strip 3     blood glucose monitoring (NO BRAND SPECIFIED) meter device kit Use to test blood sugar 2 times daily or as  "directed. 1 kit 0     colchicine (COLCYRS) 0.6 MG tablet Take 2 pills today then 1 daily as needed for the gout 10 tablet 1     Leuprolide Acetate, 4 Month, (LUPRON DEPOT, 4-MONTH, IM)        tamsulosin (FLOMAX) 0.4 MG capsule Take 2 capsules (0.8 mg) by mouth At Bedtime 180 capsule 2     Allergies   Allergen Reactions     No Known Allergies      FAMILY HISTORY NOTED AND REVIEWED    REVIEW OF SYSTEMS: above    PHYSICAL EXAM    BP (!) 156/92   Pulse 73   Temp 98.3  F (36.8  C) (Tympanic)   Ht 1.753 m (5' 9\")   Wt 125.6 kg (276 lb 14.4 oz)   SpO2 93%   BMI 40.89 kg/m      Patient appears non toxic  Tympanic membranes and canals: within normal limits bilaterally.   Mouth: Posterior pharynx, mucous membranes and tongue exam within normal limits.  Neck: supple, no nuchal rigidity or masses.  No anterior or posterior cervical adenopathy.    Lungs: clear, normal flow and effort.  cv reglar rate and rhythm with freq ectopy, no murmer, rub or gallop, no jvp, min pedal and lower leg edema  Abdomen normal active bowel sounds, soft non-tender  Left great toe at mtp red, swollen, min tender, no effusion    ekg - nsr, pac's, otherwise within normal limits.    ASSESSMENT:  1. Uri, suspect viral, doubt bacterial, flu, covid, chf  2. Toe swelling, c/w gout  3. irreg heart, pac's, not afib  4. Hypertension, up now, monitor it  5. Edema, suspect prednisone    PLAN:  For uri call if worsens, not gone soon  For toe colchicine  For blood pressure monitor it  For edema no changes, call if not gone soon    Juliano Pereira M.D.        PLAN:    Juliano Pereira M.D.        "

## 2020-03-09 NOTE — TELEPHONE ENCOUNTER
Reason for call:  Patient reporting a symptom    Symptom or request: heart palpitations, fluid retention - is diabetic    Duration (how long have symptoms been present): 3   weeks    Have you been treated for this before? No    Additional comments: Pt is trying to recover from a cold x3 weeks. Typically has a small amount of fluid retention but this time is a lot more. When Pt eats, fluid runs out of his nose and eyes    Phone Number patient can be reached at:  Cell number on file:    Telephone Information:   Mobile [phone]     Best Time:  any    Can we leave a detailed message on this number:  YES    Call taken on 3/9/2020 at 9:06 AM by Lulu Lockett    *transferred to triage

## 2020-05-20 ENCOUNTER — TRANSFERRED RECORDS (OUTPATIENT)
Dept: HEALTH INFORMATION MANAGEMENT | Facility: CLINIC | Age: 80
End: 2020-05-20

## 2020-08-19 ENCOUNTER — TELEPHONE (OUTPATIENT)
Dept: FAMILY MEDICINE | Facility: CLINIC | Age: 80
End: 2020-08-19

## 2020-08-19 DIAGNOSIS — R30.0 DYSURIA: Primary | ICD-10-CM

## 2020-08-19 RX ORDER — CEPHALEXIN 500 MG/1
500 CAPSULE ORAL 2 TIMES DAILY
Qty: 14 CAPSULE | Refills: 0 | Status: SHIPPED | OUTPATIENT
Start: 2020-08-19 | End: 2021-07-19

## 2020-08-19 NOTE — TELEPHONE ENCOUNTER
"Pt was very rude/interruptive when discussing message below-     \"this is just ridiculous, I'll just wait to talk to Dr. Maldonado\"     Had not yet had a chance to explain that antibiotic was sent to the pharmacy    Once relayed that RX was at pharmacy discussed the importance of Cx to ensure that it is the RIGHT antibiotic - Can someone  a specimen cup for Pt to give a sample at home and drop off at clinic?     \"No, just, no. It is not possible. It is a terrible inconvenience\"     Advised to start abx and CALL BACK if symptoms get worse and not better (explained that some bacteria becomes resistant to certain antibiotics)     Verbalized agreement to call back with worsening symptoms    Dasia VIZCAINO RN    "

## 2020-08-19 NOTE — TELEPHONE ENCOUNTER
I understand concerns , is it possible that he gives at least urine sample for urine culture . Order placed.   Will send a prescription for Keflex 500 mg BID x 7 days.   Agree with advise if fever, flank pain, hematuria, lethargy follow up immediately for evaluation

## 2020-08-19 NOTE — TELEPHONE ENCOUNTER
Reason for Call:  Other prescription    Detailed comments: patient would like a prescription for UTI sent to his pharmacy. Please follow up with patient.     Phone Number Patient can be reached at: Home number on file [phone] (home)    Best Time: anytime     Can we leave a detailed message on this number? YES    Call taken on 8/19/2020 at 2:26 PM by Kristi Pressley

## 2020-08-19 NOTE — TELEPHONE ENCOUNTER
Pt requesting medication for UTI.  Has had UTI's before. .  Would normally advise phone visits, but none left today, and PCP not in, pt scared to come into clinic/UC due to covid.    Reports: Burning for 2 days, frequency, urgency, slightly cloudy urine.   Pt denies fever, flank pain, confusion, unsteadiness - Did advise if any of these pt should be seen right away.    Would a Abx be a possibility, or a phone visit workin with pt?      Mary Lugo RN

## 2020-09-02 ENCOUNTER — TRANSFERRED RECORDS (OUTPATIENT)
Dept: HEALTH INFORMATION MANAGEMENT | Facility: CLINIC | Age: 80
End: 2020-09-02

## 2020-10-19 DIAGNOSIS — N40.0 BENIGN PROSTATIC HYPERPLASIA, PRESENCE OF LOWER URINARY TRACT SYMPTOMS UNSPECIFIED: ICD-10-CM

## 2020-10-19 RX ORDER — TAMSULOSIN HYDROCHLORIDE 0.4 MG/1
0.8 CAPSULE ORAL AT BEDTIME
Qty: 180 CAPSULE | Refills: 1 | Status: SHIPPED | OUTPATIENT
Start: 2020-10-19

## 2020-10-19 NOTE — TELEPHONE ENCOUNTER
Prescription approved per [facility] Refill Protocol.  Elizabeth KWAN RN

## 2020-10-19 NOTE — TELEPHONE ENCOUNTER
Last physical October 2019 Maldonado    SIG note given, using mail order pharmacy, pended for 90 days.    Will send [employer] message    RT Kaitlin (R)

## 2020-12-04 ENCOUNTER — TRANSFERRED RECORDS (OUTPATIENT)
Dept: HEALTH INFORMATION MANAGEMENT | Facility: CLINIC | Age: 80
End: 2020-12-04

## 2020-12-27 ENCOUNTER — HEALTH MAINTENANCE LETTER (OUTPATIENT)
Age: 80
End: 2020-12-27

## 2021-03-29 ENCOUNTER — TRANSFERRED RECORDS (OUTPATIENT)
Dept: HEALTH INFORMATION MANAGEMENT | Facility: CLINIC | Age: 81
End: 2021-03-29

## 2021-05-04 ENCOUNTER — TRANSFERRED RECORDS (OUTPATIENT)
Dept: HEALTH INFORMATION MANAGEMENT | Facility: CLINIC | Age: 81
End: 2021-05-04

## 2021-05-21 ENCOUNTER — TRANSFERRED RECORDS (OUTPATIENT)
Dept: HEALTH INFORMATION MANAGEMENT | Facility: CLINIC | Age: 81
End: 2021-05-21

## 2021-05-27 ENCOUNTER — TRANSFERRED RECORDS (OUTPATIENT)
Dept: HEALTH INFORMATION MANAGEMENT | Facility: CLINIC | Age: 81
End: 2021-05-27

## 2021-06-17 ENCOUNTER — OFFICE VISIT (OUTPATIENT)
Dept: FAMILY MEDICINE | Facility: CLINIC | Age: 81
End: 2021-06-17
Payer: MEDICARE

## 2021-06-17 VITALS
TEMPERATURE: 97.7 F | WEIGHT: 270 LBS | HEART RATE: 106 BPM | OXYGEN SATURATION: 96 % | HEIGHT: 69 IN | BODY MASS INDEX: 39.99 KG/M2

## 2021-06-17 DIAGNOSIS — N18.30 STAGE 3 CHRONIC KIDNEY DISEASE, UNSPECIFIED WHETHER STAGE 3A OR 3B CKD (H): ICD-10-CM

## 2021-06-17 DIAGNOSIS — E11.22 TYPE 2 DIABETES MELLITUS WITH STAGE 3 CHRONIC KIDNEY DISEASE, WITHOUT LONG-TERM CURRENT USE OF INSULIN, UNSPECIFIED WHETHER STAGE 3A OR 3B CKD (H): ICD-10-CM

## 2021-06-17 DIAGNOSIS — N18.30 TYPE 2 DIABETES MELLITUS WITH STAGE 3 CHRONIC KIDNEY DISEASE, WITHOUT LONG-TERM CURRENT USE OF INSULIN, UNSPECIFIED WHETHER STAGE 3A OR 3B CKD (H): ICD-10-CM

## 2021-06-17 DIAGNOSIS — Z00.00 WELLNESS EXAMINATION: Primary | ICD-10-CM

## 2021-06-17 LAB
BASOPHILS # BLD AUTO: 0 10E9/L (ref 0–0.2)
BASOPHILS NFR BLD AUTO: 0.2 %
CHOLEST SERPL-MCNC: 158 MG/DL
CREAT SERPL-MCNC: 1.46 MG/DL (ref 0.66–1.25)
DIFFERENTIAL METHOD BLD: ABNORMAL
EOSINOPHIL # BLD AUTO: 0.2 10E9/L (ref 0–0.7)
EOSINOPHIL NFR BLD AUTO: 1.9 %
ERYTHROCYTE [DISTWIDTH] IN BLOOD BY AUTOMATED COUNT: 14.4 % (ref 10–15)
GFR SERPL CREATININE-BSD FRML MDRD: 44 ML/MIN/{1.73_M2}
HBA1C MFR BLD: 7.4 % (ref 0–5.6)
HCT VFR BLD AUTO: 38.3 % (ref 40–53)
HDLC SERPL-MCNC: 53 MG/DL
HGB BLD-MCNC: 13 G/DL (ref 13.3–17.7)
LDLC SERPL CALC-MCNC: 81 MG/DL
LYMPHOCYTES # BLD AUTO: 1.6 10E9/L (ref 0.8–5.3)
LYMPHOCYTES NFR BLD AUTO: 19.5 %
MCH RBC QN AUTO: 31.9 PG (ref 26.5–33)
MCHC RBC AUTO-ENTMCNC: 33.9 G/DL (ref 31.5–36.5)
MCV RBC AUTO: 94 FL (ref 78–100)
MONOCYTES # BLD AUTO: 0.9 10E9/L (ref 0–1.3)
MONOCYTES NFR BLD AUTO: 10.4 %
NEUTROPHILS # BLD AUTO: 5.7 10E9/L (ref 1.6–8.3)
NEUTROPHILS NFR BLD AUTO: 68 %
NONHDLC SERPL-MCNC: 105 MG/DL
PLATELET # BLD AUTO: 254 10E9/L (ref 150–450)
RBC # BLD AUTO: 4.08 10E12/L (ref 4.4–5.9)
TRIGL SERPL-MCNC: 122 MG/DL
WBC # BLD AUTO: 8.4 10E9/L (ref 4–11)

## 2021-06-17 PROCEDURE — G0439 PPPS, SUBSEQ VISIT: HCPCS | Performed by: INTERNAL MEDICINE

## 2021-06-17 PROCEDURE — 83036 HEMOGLOBIN GLYCOSYLATED A1C: CPT | Performed by: INTERNAL MEDICINE

## 2021-06-17 PROCEDURE — 82565 ASSAY OF CREATININE: CPT | Performed by: INTERNAL MEDICINE

## 2021-06-17 PROCEDURE — 80061 LIPID PANEL: CPT | Performed by: INTERNAL MEDICINE

## 2021-06-17 PROCEDURE — 85025 COMPLETE CBC W/AUTO DIFF WBC: CPT | Performed by: INTERNAL MEDICINE

## 2021-06-17 PROCEDURE — 36415 COLL VENOUS BLD VENIPUNCTURE: CPT | Performed by: INTERNAL MEDICINE

## 2021-06-17 PROCEDURE — 82043 UR ALBUMIN QUANTITATIVE: CPT | Performed by: INTERNAL MEDICINE

## 2021-06-17 ASSESSMENT — ACTIVITIES OF DAILY LIVING (ADL): CURRENT_FUNCTION: NO ASSISTANCE NEEDED

## 2021-06-17 ASSESSMENT — MIFFLIN-ST. JEOR: SCORE: 1920.09

## 2021-06-17 NOTE — PROGRESS NOTES
"SUBJECTIVE:   Ross Caballero is a 81 year old male who presents for Preventive Visit.    Patient has been advised of split billing requirements and indicates understanding: Yes   Are you in the first 12 months of your Medicare coverage?  No    Healthy Habits:     In general, how would you rate your overall health?  Fair    Frequency of exercise:  None    Do you usually eat at least 4 servings of fruit and vegetables a day, include whole grains    & fiber and avoid regularly eating high fat or \"junk\" foods?  Yes    Taking medications regularly:  Yes    Medication side effects:  None    Ability to successfully perform activities of daily living:  No assistance needed    Home Safety:  No safety concerns identified    Hearing Impairment:  No hearing concerns    In the past 6 months, have you been bothered by leaking of urine? Yes    In general, how would you rate your overall mental or emotional health?  Good      PHQ-2 Total Score: 1    Additional concerns today:  No    Do you feel safe in your environment? Yes    Have you ever done Advance Care Planning? (For example, a Health Directive, POLST, or a discussion with a medical provider or your loved ones about your wishes): No, advance care planning information given to patient to review.  Patient plans to discuss their wishes with loved ones or provider.        Fall risk       Cognitive Screening   1) Repeat 3 items (Leader, Season, Table)    2) Clock draw: NORMAL  3) 3 item recall: Recalls 2 objects   Results: NORMAL clock, 1-2 items recalled: COGNITIVE IMPAIRMENT LESS LIKELY    Mini-CogTM Copyright JAIME Amezcua. Licensed by the author for use in [facility]; reprinted with permission (renee@.[email or web address]). All rights reserved.      Do you have sleep apnea, excessive snoring or daytime drowsiness?: no    Reviewed and updated as needed this visit by clinical staff                 Reviewed and updated as needed this visit by Provider                Social History " "    Tobacco Use     Smoking status: Former Smoker     Quit date: 1970     Years since quittin.4     Smokeless tobacco: Never Used   Substance Use Topics     Alcohol use: Yes     Alcohol/week: 0.0 - 1.0 standard drinks     Comment: rare     If you drink alcohol do you typically have >3 drinks per day or >7 drinks per week? No    Alcohol Use 2021   Prescreen: >3 drinks/day or >7 drinks/week? Not Applicable        Current providers sharing in care for this patient include:   Patient Care Team:  Errol Maldonado MD as PCP - General (Internal Medicine)  Juliano Pereira MD as Assigned PCP    The following health maintenance items are reviewed in Epic and correct as of today:  Health Maintenance Due   Topic Date Due     ANNUAL REVIEW OF  ORDERS  Never done     ZOSTER IMMUNIZATION (1 of 2) Never done     EYE EXAM  2020     A1C  2020     MEDICARE ANNUAL WELLNESS VISIT  10/18/2020     BMP  10/18/2020     LIPID  10/18/2020     MICROALBUMIN  10/18/2020     DIABETIC FOOT EXAM  10/18/2020     FALL RISK ASSESSMENT  2021     Labs reviewed in EPIC      Review of Systems  Constitutional, HEENT, cardiovascular, pulmonary, GI, , musculoskeletal, neuro, skin, endocrine and psych systems are negative, except as otherwise noted.    OBJECTIVE:   Pulse 106   Temp 97.7  F (36.5  C) (Temporal)   Ht 1.753 m (5' 9\")   Wt 122.5 kg (270 lb)   SpO2 96%   BMI 39.87 kg/m   Estimated body mass index is 40.89 kg/m  as calculated from the following:    Height as of 3/9/20: 1.753 m (5' 9\").    Weight as of 3/9/20: 125.6 kg (276 lb 14.4 oz).  Physical Exam  GENERAL: healthy, alert and no distress  EYES: Eyes grossly normal to inspection, PERRL and conjunctivae and sclerae normal  HENT: ear canals and TM's normal, nose and mouth without ulcers or lesions  NECK: no adenopathy, no asymmetry, masses, or scars and thyroid normal to palpation  RESP: lungs clear to auscultation - no rales, rhonchi " "or wheezes  CV: regular rate and rhythm, normal S1 S2, no S3 or S4, no murmur, click or rub, no peripheral edema and peripheral pulses strong  ABDOMEN: soft, nontender, no hepatosplenomegaly, no masses and bowel sounds normal  MS: no gross musculoskeletal defects noted, no edema  SKIN: no suspicious lesions or rashes  NEURO: Normal strength and tone, mentation intact and speech normal  PSYCH: mentation appears normal, affect normal/bright    Diagnostic Test Results:  Labs reviewed in Epic    ASSESSMENT / PLAN:   (Z00.00) Wellness examination  (primary encounter diagnosis)  (E11.22,  N18.30) Type 2 diabetes mellitus with stage 3 chronic kidney disease, without long-term current use of insulin, unspecified whether stage 3a or 3b CKD (H)  Comment: yearly physical exam today  Plan: Hemoglobin A1c, Albumin Random Urine         Quantitative with Creat Ratio, Lipid panel         reflex to direct LDL Fasting, CBC with         platelets and differential, MED THERAPY MANAGE         REFERRAL, Creatinine    (N18.30) Stage 3 chronic kidney disease, unspecified whether stage 3a or 3b CKD  Comment: chronic CKD, s/p previous left kidney nephrectomy  Plan: NEPHROLOGY ADULT REFERRAL      Patient has been advised of split billing requirements and indicates understanding: Yes  COUNSELING:  Reviewed preventive health counseling, as reflected in patient instructions  Special attention given to:       Regular exercise       Healthy diet/nutrition    Estimated body mass index is 40.89 kg/m  as calculated from the following:    Height as of 3/9/20: 1.753 m (5' 9\").    Weight as of 3/9/20: 125.6 kg (276 lb 14.4 oz).    Weight management plan: Discussed healthy diet and exercise guidelines    He reports that he quit smoking about 51 years ago. He has never used smokeless tobacco.      Appropriate preventive services were discussed with this patient, including applicable screening as appropriate for cardiovascular disease, diabetes, " osteopenia/osteoporosis, and glaucoma.  As appropriate for age/gender, discussed screening for colorectal cancer, prostate cancer, breast cancer, and cervical cancer. Checklist reviewing preventive services available has been given to the patient.    Reviewed patients plan of care and provided an AVS. The Basic Care Plan (routine screening as documented in Health Maintenance) for Ross meets the Care Plan requirement. This Care Plan has been established and reviewed with the Patient.    Counseling Resources:  ATP IV Guidelines  Pooled Cohorts Equation Calculator  Breast Cancer Risk Calculator  Breast Cancer: Medication to Reduce Risk  FRAX Risk Assessment  ICSI Preventive Guidelines  Dietary Guidelines for Americans, 2010  USDA's MyPlate  ASA Prophylaxis  Lung CA Screening    Martha Cr MD  [facility]    Identified Health Risks:

## 2021-06-18 LAB
CREAT UR-MCNC: 122 MG/DL
MICROALBUMIN UR-MCNC: 274 MG/L
MICROALBUMIN/CREAT UR: 224.59 MG/G CR (ref 0–17)

## 2021-07-06 ENCOUNTER — TRANSFERRED RECORDS (OUTPATIENT)
Dept: HEALTH INFORMATION MANAGEMENT | Facility: CLINIC | Age: 81
End: 2021-07-06

## 2021-07-12 ENCOUNTER — TELEPHONE (OUTPATIENT)
Dept: WOUND CARE | Facility: CLINIC | Age: 81
End: 2021-07-12

## 2021-07-12 NOTE — TELEPHONE ENCOUNTER
RADHA Hawkins was referred by Dr. Juliano Grayson from  Orthopedics referred him for his heel wound for the past 3 months.

## 2021-07-19 ENCOUNTER — HOSPITAL ENCOUNTER (OUTPATIENT)
Dept: WOUND CARE | Facility: CLINIC | Age: 81
Discharge: HOME | End: 2021-07-19
Attending: SURGERY | Admitting: SURGERY
Payer: MEDICARE

## 2021-07-19 VITALS
HEART RATE: 110 BPM | BODY MASS INDEX: 40.29 KG/M2 | DIASTOLIC BLOOD PRESSURE: 77 MMHG | TEMPERATURE: 97 F | SYSTOLIC BLOOD PRESSURE: 147 MMHG | WEIGHT: 272 LBS | HEIGHT: 69 IN | RESPIRATION RATE: 16 BRPM

## 2021-07-19 DIAGNOSIS — E11.621 DIABETIC ULCER OF LEFT HEEL ASSOCIATED WITH TYPE 2 DIABETES MELLITUS, WITH FAT LAYER EXPOSED (H): ICD-10-CM

## 2021-07-19 DIAGNOSIS — L97.422 DIABETIC ULCER OF LEFT HEEL ASSOCIATED WITH TYPE 2 DIABETES MELLITUS, WITH FAT LAYER EXPOSED (H): ICD-10-CM

## 2021-07-19 PROBLEM — N39.3 MALE URINARY STRESS INCONTINENCE: Status: ACTIVE | Noted: 2019-01-17

## 2021-07-19 PROBLEM — N17.9 AKI (ACUTE KIDNEY INJURY) (H): Status: ACTIVE | Noted: 2018-05-09

## 2021-07-19 PROBLEM — R39.9 LOWER URINARY TRACT SYMPTOMS: Status: ACTIVE | Noted: 2018-07-19

## 2021-07-19 PROBLEM — N13.30 HYDROURETERONEPHROSIS: Status: ACTIVE | Noted: 2018-05-09

## 2021-07-19 PROCEDURE — 11042 DBRDMT SUBQ TIS 1ST 20SQCM/<: CPT | Performed by: SURGERY

## 2021-07-19 PROCEDURE — 17250 CHEM CAUT OF GRANLTJ TISSUE: CPT | Performed by: SURGERY

## 2021-07-19 PROCEDURE — 99203 OFFICE O/P NEW LOW 30 MIN: CPT | Performed by: SURGERY

## 2021-07-19 PROCEDURE — 97597 DBRDMT OPN WND 1ST 20 CM/<: CPT | Performed by: SURGERY

## 2021-07-19 RX ORDER — ONDANSETRON 8 MG/1
TABLET, ORALLY DISINTEGRATING ORAL
COMMUNITY
Start: 2020-12-04

## 2021-07-19 ASSESSMENT — MIFFLIN-ST. JEOR: SCORE: 1929.16

## 2021-07-19 NOTE — PROGRESS NOTES
[facility] [place] Progress Note    Subject: Ross Caballero consultation for left heel ulceration, chronic.  Medical record reviewed.  History of polycystic kidney disease, chronic renal insufficiency, adult-onset diabetes on insulin, denies history of deep venous thromboses of the left lower extremity.  States wound has been present for multiple months.  Has had prior evaluation, foam pad was placed in shoe, has been performing triple antibiotic ointment dressing changes to the heel.  Has had partial amputation of the right third toe previously, no surgical procedures on the left foot.  History of renal stones, history of prostate cancer in 2019.  Medical record reviewed, medications reviewed, not on Norvasc.  Cease tobacco use 97.    PMH:   Past Medical History:   Diagnosis Date     Benign essential hypertension      BPH (benign prostatic hyperplasia)     flomax      Calculus of kidney     with lithrotripsy      Diabetes mellitus (H)      Melanoma (H)      Obese      Osteoarthritis, knee     has had synvisc type shot      Osteomyelitis (H)     toe amputation     Prostate cancer (H) 10/18/2019     Pyelonephritis 2016     Patient Active Problem List   Diagnosis     Calculus of kidney     Polycystic kidney disease     Essential hypertension     Advanced directives, counseling/discussion     Chronic kidney disease, stage III (moderate)     Type 2 diabetes mellitus with stage 3 chronic kidney disease, without long-term current use of insulin (H)     Prostate cancer (H)     Morbid obesity (H)     Social Hx:   Social History     Socioeconomic History     Marital status:      Spouse name: Not on file     Number of children: Not on file     Years of education: Not on file     Highest education level: Not on file   Occupational History     Not on file   Tobacco Use     Smoking status: Former Smoker     Quit date: 1970     Years since quittin.5     Smokeless tobacco: Never Used    Substance and Sexual Activity     Alcohol use: Yes     Alcohol/week: 0.0 - 1.0 standard drinks     Comment: rare     Drug use: No     Sexual activity: Yes     Partners: Female   Other Topics Concern     Parent/sibling w/ CABG, MI or angioplasty before 65F 55M? Not Asked   Social History Narrative     Not on file     Social Determinants of Health     Financial Resource Strain:      Difficulty of Paying Living Expenses:    Food Insecurity:      Worried About Running Out of Food in the Last Year:      Ran Out of Food in the Last Year:    Transportation Needs:      Lack of Transportation (Medical):      Lack of Transportation (Non-Medical):    Physical Activity:      Days of Exercise per Week:      Minutes of Exercise per Session:    Stress:      Feeling of Stress :    Social Connections:      Frequency of Communication with Friends and Family:      Frequency of Social Gatherings with Friends and Family:      Attends [employer] Services:      Active Member of Clubs or Organizations:      Attends Club or Organization Meetings:      Marital Status:    Intimate Partner Violence:      Fear of Current or Ex-Partner:      Emotionally Abused:      Physically Abused:      Sexually Abused:        Surgical Hx:   Past Surgical History:   Procedure Laterality Date     AMPUTATION of toe       COLONOSCOPY       CYSTOSCOPY FLEXIBLE, CYOABLATION PROSTATE N/A 9/27/2018    Procedure: CYSTOSCOPY FLEXIBLE, CRYOABLATION PROSTATE;  FLEXIBLE CYSTOSCOPY, CRYOABLATION OF THE PROSTATE ;  Surgeon: Nima Calvert MD;  Location: [facility] OR     GENITOURINARY SURGERY Left     Nephrectomy     HERNIA REPAIR      Umbilical hernia      melanoma removal         Allergies:    Allergies   Allergen Reactions     No Known Allergies        Medications:   Current Outpatient Medications   Medication     acetaminophen (TYLENOL) 325 MG tablet     Alcohol Swabs (ALCOHOL PADS) 70 % PADS     blood glucose (NO BRAND SPECIFIED) lancets standard     blood glucose (NO  BRAND SPECIFIED) test strip     blood glucose monitoring (NO BRAND SPECIFIED) meter device kit     cephALEXin (KEFLEX) 500 MG capsule     colchicine (COLCYRS) 0.6 MG tablet     Leuprolide Acetate, 4 Month, (LUPRON DEPOT, 4-MONTH, IM)     tamsulosin (FLOMAX) 0.4 MG capsule     No current facility-administered medications for this encounter.       Labs:   Recent Labs   Lab Test 06/17/21  0758 10/18/19  1002 05/10/18  0000   ALBUMIN  --  3.7  --    HGB 13.0* 14.2  --    INR  --   --  1.1   WBC 8.4 6.5  --    A1C 7.4* 6.4*  --      Creatinine   Date Value Ref Range Status   06/17/2021 1.46 (H) 0.66 - 1.25 mg/dL Final     GFR Estimate   Date Value Ref Range Status   06/17/2021 44 (L) >60 mL/min/[1.73_m2] Final     Comment:     Non  GFR Calc  Starting 12/18/2018, serum creatinine based estimated GFR (eGFR) will be   calculated using the Chronic Kidney Disease Epidemiology Collaboration   (CKD-EPI) equation.       GFR Estimate If Black   Date Value Ref Range Status   06/17/2021 52 (L) >60 mL/min/[1.73_m2] Final     Comment:      GFR Calc  Starting 12/18/2018, serum creatinine based estimated GFR (eGFR) will be   calculated using the Chronic Kidney Disease Epidemiology Collaboration   (CKD-EPI) equation.       Lab Results   Component Value Date    WBC 8.4 06/17/2021     Lab Results   Component Value Date    RBC 4.08 06/17/2021     Lab Results   Component Value Date    HGB 13.0 06/17/2021     Lab Results   Component Value Date    HCT 38.3 06/17/2021     No components found for: MCT  Lab Results   Component Value Date    MCV 94 06/17/2021     Lab Results   Component Value Date    MCH 31.9 06/17/2021     Lab Results   Component Value Date    MCHC 33.9 06/17/2021     Lab Results   Component Value Date    RDW 14.4 06/17/2021     Lab Results   Component Value Date     06/17/2021          Nutrition requirements were discussed with patient today.  Objective:  BP (!) 147/77   Pulse 110   Temp  "97  F (36.1  C) (Temporal)   Resp 16   Ht 1.753 m (5' 9\")   Wt 123.4 kg (272 lb)   BMI 40.17 kg/m          General:  Patient is alert and orientated, no acute distress.  Conversant    Vascular: Palpable pedal pulses.  Examination of right foot reveals no ulcerations, partial amputation distal aspect of right third toe.  Chronic wound plantar surface left heel with minimal undermining which has been excised with a 15 blade and hemostasis achieved with application of silver nitrate, hypochlorous acid applied.  Mild to moderate neuropathy right and left lower extremities.  Obesity.  Lymphedema related to diabetes of the left lower extremity    Procedure:   Patient was determined to be capable of making their own medical decisions and informed consent was obtained, topical anesthetic of 4% lidocaine was applied, debridement was performed.  15 blade was used to debride ulcer down to and including subcutaneous tissue and periwound callus of the left heel. Bleeding controlled with light pressure and application of silver nitrate. Patient tolerated procedure well.    Impression: Chronic left heel wound, diabetic, neuropathy, obesity, polycystic kidney disease, chronic renal insufficiency      Plan: Obtain ankle-brachial indices.  Discussed performance of total contact casting left lower extremity, will utilize edema wear for reduction of diabetic related lymphedema left lower extremity, begin use now, then continue 1 total contact cast, anticipate 4-6 total contact cast changes with subsequent follow-up with podiatry to arrange for appropriate shoe wear to decrease recidivism rates.  Anticipate initiating total contact cast application next week, initial cast change every 3 to 4 days, then on a weekly basis until healed +1-week.  Will begin micronized purified flavonoid fraction for management of lymphedema associated with diabetes, B12, B6, folate, Ivonne-C, vitamin D at reduced dosing of 5000 IU  daily given history of " renal stones.    Patient will return to the clinic in 1 weeks time after Omid Anand MD on 7/19/2021 at 10:49 AM

## 2021-07-21 ENCOUNTER — HOSPITAL ENCOUNTER (OUTPATIENT)
Dept: ULTRASOUND IMAGING | Facility: CLINIC | Age: 81
Discharge: HOME | End: 2021-07-21
Attending: SURGERY | Admitting: SURGERY
Payer: MEDICARE

## 2021-07-21 DIAGNOSIS — E11.621 DIABETIC ULCER OF LEFT HEEL ASSOCIATED WITH TYPE 2 DIABETES MELLITUS, WITH FAT LAYER EXPOSED (H): ICD-10-CM

## 2021-07-21 DIAGNOSIS — L97.422 DIABETIC ULCER OF LEFT HEEL ASSOCIATED WITH TYPE 2 DIABETES MELLITUS, WITH FAT LAYER EXPOSED (H): ICD-10-CM

## 2021-07-21 PROCEDURE — 93922 UPR/L XTREMITY ART 2 LEVELS: CPT

## 2021-07-21 NOTE — NURSING NOTE
Health Maintenance --faxed [facility] for last eye exam [facility] in [facility].  Received 2018 notes.

## 2021-07-22 ENCOUNTER — TRANSFERRED RECORDS (OUTPATIENT)
Dept: HEALTH INFORMATION MANAGEMENT | Facility: CLINIC | Age: 81
End: 2021-07-22

## 2021-07-28 ENCOUNTER — HOSPITAL ENCOUNTER (OUTPATIENT)
Dept: WOUND CARE | Facility: CLINIC | Age: 81
Discharge: HOME | End: 2021-07-28
Attending: SURGERY | Admitting: SURGERY
Payer: MEDICARE

## 2021-07-28 VITALS — SYSTOLIC BLOOD PRESSURE: 151 MMHG | TEMPERATURE: 96.9 F | DIASTOLIC BLOOD PRESSURE: 96 MMHG | HEART RATE: 90 BPM

## 2021-07-28 DIAGNOSIS — L97.422 DIABETIC ULCER OF LEFT HEEL ASSOCIATED WITH TYPE 2 DIABETES MELLITUS, WITH FAT LAYER EXPOSED (H): ICD-10-CM

## 2021-07-28 DIAGNOSIS — E11.621 DIABETIC ULCER OF LEFT HEEL ASSOCIATED WITH TYPE 2 DIABETES MELLITUS, WITH FAT LAYER EXPOSED (H): ICD-10-CM

## 2021-07-28 PROCEDURE — 11042 DBRDMT SUBQ TIS 1ST 20SQCM/<: CPT

## 2021-07-28 PROCEDURE — 29445 APPL RIGID TOT CNTC LEG CAST: CPT | Mod: XU

## 2021-07-28 PROCEDURE — 97597 DBRDMT OPN WND 1ST 20 CM/<: CPT | Performed by: SURGERY

## 2021-07-28 PROCEDURE — 17250 CHEM CAUT OF GRANLTJ TISSUE: CPT | Performed by: SURGERY

## 2021-07-28 NOTE — DISCHARGE INSTRUCTIONS
"   [facility]  [address] [address]    Call us at [phone] if you have any questions about your wounds, have redness or swelling around your wound, have a fever of 101 or greater or if you have any other problems or concerns. We answer the phone Monday through Friday 8 am to 4 pm, please leave a message as we check the voicemail frequently throughout the day.     Ross VANI Caballero      1940    Wound Dressing Change:Left Heel  Cleanse wound and surrounding skin with: prophase protocol  Cover wound with Endoform Antimicrobial, Ioplex and Edemawear and TCC Foam  Change dressing Friday and weekly  TCC-EZ: The TCC-EZ Total Contact Cast that has been placed on your foot and leg by your doctor has been proven to be an effective method of off-loading your foot. It is important that you understand potential problems with the cast so they can be avoided. Please follow the instructions below during your care.  1. Limit activity for first 24 hours after cast is applied  2. The outer boot MUST always be worn when walking.  3. Do not insert anything under the cast. Injury to your skin can be very dangerous.  4. Subsequent cast changes will take place from 1-2 times per week as instructed.  5. . If the cast is \"loose\" or \"rubbing\" or causing pain, please call us- it may need to be changed. Or if you develop a fever, chills, nausea, or vomiting the cast must be removed to check the wound  6. Keep your cast dry. If the cast gets wet, it must be changed immediately. (Use a protective cast bag when you are bathing.)  7. If the cast is removed in the hospital or ER the cast technician must be notified there is only padding on the front over the shin, on the ankles and over the foot and toes.   8. You will need to wear a cast for 1-2 additional weeks after your wound has healed or until your skin has returned to normal thickness.  9. If the Clinic is closed proceed to " the nearest emergency room or urgent care  This is a non-traditional cast and must be removed in a different manner.   10. Call clinic with any problems with your cast. Our phone number is [phone]. After hours please phone your PCP or go to the nearest ER.            DIONTE Anand M.D.. July 28, 2021      If you had a positive experience please indicate on your patient satisfaction survey form that  [facility] will be sending you.    If you have any billing related questions please call the [facility] Business office at [phone]. The clinic staff does not handle billing related matters.

## 2021-07-28 NOTE — PROGRESS NOTES
[facility] [place] Progress Note    Subject: Ross Caballero chronic left plantar surface heel wound, polycystic any disease, adult-onset diabetes, obesity, history of prostate cancer on Lupron shots, ankle-brachial indices within normal limits, copious benefits of total contact casting discussed.    Patient Active Problem List   Diagnosis     Calculus of kidney     Polycystic kidney disease     Essential hypertension     Advanced directives, counseling/discussion     Chronic kidney disease, stage III (moderate)     Type 2 diabetes mellitus with stage 3 chronic kidney disease, without long-term current use of insulin (H)     Prostate cancer (H)     Morbid obesity (H)     Lower urinary tract symptoms     VARUN (acute kidney injury) (H)     BPH (benign prostatic hyperplasia)     Hydroureteronephrosis     Hyperkalemia     Male urinary stress incontinence     Diabetic ulcer of left heel associated with type 2 diabetes mellitus, 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)     Past Medical History:   Diagnosis Date     Benign essential hypertension      BPH (benign prostatic hyperplasia)     flomax      Calculus of kidney 2002    with lithrotripsy      Diabetes mellitus (H)      Melanoma (H)      Obese      Osteoarthritis, knee     has had synvisc type shot      Osteomyelitis (H)     toe amputation     Prostate cancer (H) 10/18/2019     Pyelonephritis 7/20/2016     Exam:  BP (!) 151/96   Pulse 90   Temp 96.9  F (36.1  C) (Temporal)   Wound (used by OP [facility] only) 07/19/21 1048 Left (Active)   Thickness/Stage full thickness 07/28/21 1239   Dressing Appearance moist drainage 07/28/21 1239   Base granulating 07/28/21 1239   Periwound intact 07/28/21 1239   Periwound Temperature warm 07/28/21 1239   Periwound Skin Turgor soft 07/28/21 1239   Edges open 07/28/21 1239   Length (cm) 1.2 07/28/21 1300   Width (cm) 1 07/28/21 1300   Depth (cm) 0.3 07/28/21 1300   Wound (cm^2) 1.2 cm^2 07/28/21 1300   Wound Volume (cm^3) 0.36 cm^3  07/28/21 1300   Wound healing % -380 07/28/21 1300   Undermining [Depth (cm)/Location] 6to12/0.4 07/28/21 1239   Drainage Characteristics/Odor serosanguineous 07/28/21 1239   Drainage Amount moderate 07/28/21 1239   Care, Wound debrided 07/28/21 1239     Left heel wound, callus, debrided with 15 blade, silver nitrate utilized, buildup EdemaWear placed, total contact cast placed in standard fashion.    Procedure:   Patient was determined to be capable of making their own medical decisions and informed consent was obtained. Topical anesthetic of 4% lidocaine was applied, debridement was performed using a #15 blade down to and including subcutaneous tissue and hyperkeratotic tissue bleeding controlled with light pressure and silver nitrate. Patient tolerated procedure well.    Impression: Chronic left lower extremity plantar surface heel ulceration, normal ankle-brachial indices, adult-onset diabetes, obesity, polycystic kidney disease    Plan: We will dress the wounds with primary dressing endoform and Ioplex, yellow stripe edema wear, total contact cast, adjunctive micronutrients, micronized purified flavonoid fraction, change cast in 3 days than on a weekly basis until healed, anticipate 6-8 cast changes.  Mark Anthony Anand MD on 7/28/2021 at 1:56 PM

## 2021-07-30 ENCOUNTER — HOSPITAL ENCOUNTER (OUTPATIENT)
Dept: WOUND CARE | Facility: CLINIC | Age: 81
Discharge: HOME | End: 2021-07-30
Attending: SURGERY | Admitting: SURGERY
Payer: MEDICARE

## 2021-07-30 VITALS
HEART RATE: 103 BPM | RESPIRATION RATE: 20 BRPM | DIASTOLIC BLOOD PRESSURE: 79 MMHG | SYSTOLIC BLOOD PRESSURE: 127 MMHG | TEMPERATURE: 96.2 F

## 2021-07-30 DIAGNOSIS — L97.422 DIABETIC ULCER OF LEFT HEEL ASSOCIATED WITH TYPE 2 DIABETES MELLITUS, WITH FAT LAYER EXPOSED (H): ICD-10-CM

## 2021-07-30 DIAGNOSIS — E11.621 DIABETIC ULCER OF LEFT HEEL ASSOCIATED WITH TYPE 2 DIABETES MELLITUS, WITH FAT LAYER EXPOSED (H): ICD-10-CM

## 2021-07-30 PROCEDURE — 11042 DBRDMT SUBQ TIS 1ST 20SQCM/<: CPT | Performed by: PHYSICIAN ASSISTANT

## 2021-07-30 PROCEDURE — 11042 DBRDMT SUBQ TIS 1ST 20SQCM/<: CPT

## 2021-07-30 PROCEDURE — 29445 APPL RIGID TOT CNTC LEG CAST: CPT

## 2021-07-30 NOTE — DISCHARGE INSTRUCTIONS
"[facility]  [address] [address]    Call us at [phone] if you have any questions about your wounds, have redness or swelling around your wound, have a fever of 101 or greater or if you have any other problems or concerns. We answer the phone Monday through Friday 8 am to 4 pm, please leave a message as we check the voicemail frequently throughout the day.     Ross VANI Caballero      1940    Wound Dressing Change:Left Heel  Cleanse wound and surrounding skin with: prophase protocol  Cover wound with Endoform Antimicrobial, Ioplex and Edemawear and TCC Foam  Change dressing weekly    TCC-EZ: The TCC-EZ Total Contact Cast that has been placed on your foot and leg by  your doctor has been proven to be an effective method of off-loading your foot. It is  important that you understand potential problems with the cast so they can be  avoided. Please follow the instructions below during your care.  1. Limit activity for first 24 hours after cast is applied  2. The outer boot MUST always be worn when walking.  3. Do not insert anything under the cast. Injury to your skin can be very dangerous.  4. Subsequent cast changes will take place from 1-2 times per week as instructed.  5. . If the cast is \"loose\" or \"rubbing\" or causing pain, please call us- it may need to be changed. Or if you develop a fever, chills, nausea, or vomiting the cast must be removed to check the wound  6. Keep your cast dry. If the cast gets wet, it must be changed immediately. (Use a protective cast bag when you are bathing.)  7. If the cast is removed in the hospital or ER the cast technician must be notified there is only padding on the front over the shin, on the ankles and over the foot and toes.  8. You will need to wear a cast for 1-2 additional weeks after your wound has healed or until your skin has returned to normal thickness.  9. If the Clinic is closed proceed to the " nearest emergency room or urgent care   This is a non-traditional cast and must be removed in a different manner.  10. Call clinic with any problems with your cast. Our phone number is [phone].  After hours please phone your PCP or go to the nearest ER.     June Campbell PA-C July 30, 2021    Wound Clinic follow up as scheduled    If you had a positive experience please indicate that on your patient satisfaction survey form that [facility] will be sending you.    It was a pleasure meeting with you today.  Thank you for allowing me and my team the privilege of caring for you today.  YOU are the reason we are here, and I truly hope we provided you with the excellent service you deserve.  Please let us know if there is anything else we can do for you so that we can be sure you are leaving completely satisfied with your care experience.      If you have any billing related questions please call the [facility] Business office at [phone]. The clinic staff does not handle billing related matters.

## 2021-07-30 NOTE — PROGRESS NOTES
[facility]    ASSESSMENT:   1. 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 Continue TCC with Endoform and Ioplex  2. Minimal ambulation, provided rx for walker to aid with this    HISTORY OF PRESENT ILLNESS:   Ross Caballero is a 81 year old male with a left plantar heel ulcer who returns to clinic today for a TCC change. This was placed on 7/19/21 by Dr. Anand. No issues but did feel a little pressure on his shin. Wound has hyperkeratotic rim.      PHYSICAL EXAM:  GENERAL: Patient is alert and oriented and in no acute distress  INTEGUMENTARY:   Wound (used by OP [facility] only) 07/19/21 1048 Left (Active)   Thickness/Stage full thickness 07/30/21 1251   Dressing Appearance moist drainage 07/30/21 1251   Base granulating 07/30/21 1251   Periwound intact 07/30/21 1251   Periwound Temperature warm 07/30/21 1251   Periwound Skin Turgor soft 07/30/21 1251   Edges callused 07/30/21 1251   Length (cm) 1 07/30/21 1251   Width (cm) 1 07/30/21 1251   Depth (cm) 0.4 07/30/21 1251   Wound (cm^2) 1 cm^2 07/30/21 1251   Wound Volume (cm^3) 0.4 cm^3 07/30/21 1251   Wound healing % -300 07/30/21 1251   Undermining [Depth (cm)/Location] 12-12 o'clock / 0.4 07/30/21 1251   Drainage Characteristics/Odor serosanguineous 07/30/21 1251   Drainage Amount moderate 07/30/21 1251   Care, Wound debrided 07/30/21 1251         PROCEDURE: 4% topical lidocaine was applied to the wound by nursing staff. Patient was determined to be capable of making their own medical decisions and informed consent was obtained. Using a 15 blade a surgical debridement was performed down to and including subcutaneous tissue of <20 cm2. Hemostasis was achieved with pressure. The patient tolerated the procedure well.    ENID MALDONADO PA-C

## 2021-08-05 ENCOUNTER — HOSPITAL ENCOUNTER (OUTPATIENT)
Dept: WOUND CARE | Facility: CLINIC | Age: 81
Discharge: HOME | End: 2021-08-05
Attending: SURGERY | Admitting: SURGERY
Payer: MEDICARE

## 2021-08-05 VITALS
DIASTOLIC BLOOD PRESSURE: 82 MMHG | RESPIRATION RATE: 16 BRPM | TEMPERATURE: 95.6 F | SYSTOLIC BLOOD PRESSURE: 132 MMHG | HEART RATE: 96 BPM

## 2021-08-05 DIAGNOSIS — L97.422 DIABETIC ULCER OF LEFT HEEL ASSOCIATED WITH TYPE 2 DIABETES MELLITUS, WITH FAT LAYER EXPOSED (H): ICD-10-CM

## 2021-08-05 DIAGNOSIS — E11.621 DIABETIC ULCER OF LEFT HEEL ASSOCIATED WITH TYPE 2 DIABETES MELLITUS, WITH FAT LAYER EXPOSED (H): ICD-10-CM

## 2021-08-05 PROCEDURE — 11042 DBRDMT SUBQ TIS 1ST 20SQCM/<: CPT | Performed by: SURGERY

## 2021-08-05 PROCEDURE — 29445 APPL RIGID TOT CNTC LEG CAST: CPT | Performed by: SURGERY

## 2021-08-05 PROCEDURE — 17250 CHEM CAUT OF GRANLTJ TISSUE: CPT | Performed by: SURGERY

## 2021-08-05 PROCEDURE — 97597 DBRDMT OPN WND 1ST 20 CM/<: CPT | Performed by: SURGERY

## 2021-08-05 NOTE — PROGRESS NOTES
[facility] Progress Note    Subject: Ross Caballero , chronic left heel wound, chronic left heel pain now resolved after total contact cast utilization, adult-onset diabetes, denies fevers chills sweats.    Patient Active Problem List   Diagnosis     Calculus of kidney     Polycystic kidney disease     Essential hypertension     Advanced directives, counseling/discussion     Chronic kidney disease, stage III (moderate)     Type 2 diabetes mellitus with stage 3 chronic kidney disease, without long-term current use of insulin (H)     Prostate cancer (H)     Morbid obesity (H)     Lower urinary tract symptoms     VARUN (acute kidney injury) (H)     BPH (benign prostatic hyperplasia)     Hydroureteronephrosis     Hyperkalemia     Male urinary stress incontinence     Diabetic ulcer of left heel associated with type 2 diabetes mellitus, 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)     Past Medical History:   Diagnosis Date     Benign essential hypertension      BPH (benign prostatic hyperplasia)     flomax      Calculus of kidney 2002    with lithrotripsy      Diabetes mellitus (H)      Melanoma (H)      Obese      Osteoarthritis, knee     has had synvisc type shot      Osteomyelitis (H)     toe amputation     Prostate cancer (H) 10/18/2019     Pyelonephritis 7/20/2016     Exam:  /82   Pulse 96   Temp (!) 95.6  F (35.3  C) (Temporal)   Resp 16   Wound (used by OP [facility] only) 07/19/21 1048 Left (Active)   Thickness/Stage full thickness 08/05/21 1113   Dressing Appearance moist drainage 08/05/21 1113   Base granulating 08/05/21 1113   Periwound intact;macerated 08/05/21 1113   Periwound Temperature warm 08/05/21 1113   Periwound Skin Turgor soft 08/05/21 1113   Edges open 08/05/21 1113   Length (cm) 0.4 08/05/21 1113   Width (cm) 0.5 08/05/21 1113   Depth (cm) 0.3 08/05/21 1113   Wound (cm^2) 0.2 cm^2 08/05/21 1113   Wound Volume (cm^3) 0.06 cm^3 08/05/21 1113   Wound healing % 20 08/05/21 1113   Undermining [Depth  (cm)/Location] 0 08/05/21 1113   Drainage Characteristics/Odor serosanguineous 08/05/21 1113   Drainage Amount moderate 08/05/21 1113   Care, Wound debrided 08/05/21 1113     Left heel wound, chronic, no cellulitis     Procedure:   Patient was determined to be capable of making their own medical decisions and informed consent was obtained. Topical anesthetic of 4% lidocaine was applied, debridement was performed using a #15 blade down to and including subcutaneous tissue and biofilm, bleeding controlled with light pressure.  Application of silver nitrate patient tolerated procedure well.    Microbial endoform, absorptive dressing, EdemaWear, total contact cast placed    Impression: Chronic left heel wound, serial total contact casting    Plan: Patient will return to the clinic in 1 weeks time for TCC change    Mark Anthony Anand MD on 8/5/2021 at 11:42 AM

## 2021-08-05 NOTE — DISCHARGE INSTRUCTIONS
"   [facility]  [address] [address]    Call us at [phone] if you have any questions about your wounds, have redness or swelling around your wound, have a fever of 101 or greater or if you have any other problems or concerns. We answer the phone Monday through Friday 8 am to 4 pm, please leave a message as we check the voicemail frequently throughout the day.     Ross Caballero      1940    Wound Dressing Change:Left Heel  Cleanse wound and surrounding skin with: prophase protocol  Cover wound with Endoform Antimicrobial, Edemawear and TCC Foam  Change dressing weekly  TCC-EZ: The TCC-EZ Total Contact Cast that has been placed on your foot and leg by your doctor has been proven to be an effective method of off-loading your foot. It is important that you understand potential problems with the cast so they can be avoided. Please follow the instructions below during your care.  1. Limit activity for first 24 hours after cast is applied  2. The outer boot MUST always be worn when walking.  3. Do not insert anything under the cast. Injury to your skin can be very dangerous.  4. Subsequent cast changes will take place from 1-2 times per week as instructed.  5. . If the cast is \"loose\" or \"rubbing\" or causing pain, please call us- it may need to be changed. Or if you develop a fever, chills, nausea, or vomiting the cast must be removed to check the wound  6. Keep your cast dry. If the cast gets wet, it must be changed immediately. (Use a protective cast bag when you are bathing.)  7. If the cast is removed in the hospital or ER the cast technician must be notified there is only padding on the front over the shin, on the ankles and over the foot and toes.   8. You will need to wear a cast for 1-2 additional weeks after your wound has healed or until your skin has returned to normal thickness.  9. If the Clinic is closed proceed to the nearest emergency " room or urgent care  This is a non-traditional cast and must be removed in a different manner.   10. Call clinic with any problems with your cast. Our phone number is [phone]. After hours please phone your PCP or go to the nearest ER.        DIONTE Anand M.D.. August 5, 2021      If you had a positive experience please indicate on your patient satisfaction survey form that [employer] [facility] will be sending you.    If you have any billing related questions please call the  [employer] Business office at [phone]. The clinic staff does not handle billing related matters.

## 2021-08-12 ENCOUNTER — HOSPITAL ENCOUNTER (OUTPATIENT)
Dept: WOUND CARE | Facility: CLINIC | Age: 81
Discharge: HOME | End: 2021-08-12
Attending: SURGERY | Admitting: SURGERY
Payer: MEDICARE

## 2021-08-12 VITALS
HEART RATE: 91 BPM | TEMPERATURE: 97.5 F | SYSTOLIC BLOOD PRESSURE: 119 MMHG | RESPIRATION RATE: 16 BRPM | DIASTOLIC BLOOD PRESSURE: 89 MMHG

## 2021-08-12 DIAGNOSIS — L97.422 DIABETIC ULCER OF LEFT HEEL ASSOCIATED WITH TYPE 2 DIABETES MELLITUS, WITH FAT LAYER EXPOSED (H): ICD-10-CM

## 2021-08-12 DIAGNOSIS — E11.621 DIABETIC ULCER OF LEFT HEEL ASSOCIATED WITH TYPE 2 DIABETES MELLITUS, WITH FAT LAYER EXPOSED (H): ICD-10-CM

## 2021-08-12 PROCEDURE — 11042 DBRDMT SUBQ TIS 1ST 20SQCM/<: CPT | Performed by: SURGERY

## 2021-08-12 PROCEDURE — 29581 APPL MULTLAYER CMPRN SYS LEG: CPT | Mod: XU

## 2021-08-12 PROCEDURE — 87070 CULTURE OTHR SPECIMN AEROBIC: CPT | Performed by: SURGERY

## 2021-08-12 PROCEDURE — 17250 CHEM CAUT OF GRANLTJ TISSUE: CPT | Performed by: SURGERY

## 2021-08-12 PROCEDURE — 11042 DBRDMT SUBQ TIS 1ST 20SQCM/<: CPT

## 2021-08-12 PROCEDURE — 99213 OFFICE O/P EST LOW 20 MIN: CPT | Mod: 25 | Performed by: SURGERY

## 2021-08-12 NOTE — PROGRESS NOTES
[facility] [place] Progress Note    Subject: Ross Caballero left heel wound, serial total contact casting, has had increased discomfort at heel region this week.  States blood sugars are remain within normal limits.    Patient Active Problem List   Diagnosis     Calculus of kidney     Polycystic kidney disease     Essential hypertension     Advanced directives, counseling/discussion     Chronic kidney disease, stage III (moderate)     Type 2 diabetes mellitus with stage 3 chronic kidney disease, without long-term current use of insulin (H)     Prostate cancer (H)     Morbid obesity (H)     Lower urinary tract symptoms     VARUN (acute kidney injury) (H)     BPH (benign prostatic hyperplasia)     Hydroureteronephrosis     Hyperkalemia     Male urinary stress incontinence     Diabetic ulcer of left heel associated with type 2 diabetes mellitus, 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)     Past Medical History:   Diagnosis Date     Benign essential hypertension      BPH (benign prostatic hyperplasia)     flomax      Calculus of kidney 2002    with lithrotripsy      Diabetes mellitus (H)      Melanoma (H)      Obese      Osteoarthritis, knee     has had synvisc type shot      Osteomyelitis (H)     toe amputation     Prostate cancer (H) 10/18/2019     Pyelonephritis 7/20/2016     Exam:  /89   Pulse 91   Temp 97.5  F (36.4  C) (Temporal)   Resp 16   Wound (used by OP [facility] only) 07/19/21 1048 Left (Active)   Thickness/Stage full thickness 08/12/21 1038   Dressing Appearance moist drainage 08/12/21 1038   Base granulating 08/12/21 1038   Periwound intact 08/12/21 1038   Periwound Temperature warm 08/12/21 1038   Periwound Skin Turgor soft 08/12/21 1038   Edges callused 08/12/21 1038   Length (cm) 0.5 08/12/21 1038   Width (cm) 0.4 08/12/21 1038   Depth (cm) 0.8 08/12/21 1038   Wound (cm^2) 0.2 cm^2 08/12/21 1038   Wound Volume (cm^3) 0.16 cm^3 08/12/21 1038   Wound healing % 20 08/12/21 1038   Drainage  Characteristics/Odor serosanguineous;brown 08/12/21 1038   Drainage Amount moderate 08/12/21 1038   Care, Wound debrided 08/12/21 1038     Total contact cast removed, significant periwound hypertrophic callus, mild serosanguineous fluid which is new, this was cultured, wound was thoroughly irrigated, subsequently debrided, palpable 2+ left dorsalis pedis pulse, no purulent odor, no bone or tendon exposure    Procedure:   Patient was determined to be capable of making their own medical decisions and informed consent was obtained. Topical anesthetic of 4% lidocaine was applied, debridement was performed using a #15 blade down to and including subcutaneous tissue hyperkeratotic tissue, bleeding controlled with light pressure and silver nitrate.. Patient tolerated procedure well.    Impression: Chronic left heel wound, pronation of foot, chronic ankle deformation    Plan: Obtain consultation with Dr. Sergei Sofia to evaluate left foot biomechanics and architecture determine if there are options for surgical management if indicated.  We will not replace total contact cast today, EdemaWear and two layer wrap placed.  Patient will return to the clinic in 1 weeks time, would anticipate continued two layer wrap at this time with advice to offload from any weight on heel, use walker, until evaluation with Dr. Sofia.    Mark Anthony Anand MD on 8/12/2021 at 11:19 AM

## 2021-08-14 LAB — BACTERIA WND CULT: NORMAL

## 2021-08-17 ENCOUNTER — TRANSFERRED RECORDS (OUTPATIENT)
Dept: HEALTH INFORMATION MANAGEMENT | Facility: CLINIC | Age: 81
End: 2021-08-17

## 2021-08-18 ENCOUNTER — HOSPITAL ENCOUNTER (OUTPATIENT)
Dept: WOUND CARE | Facility: CLINIC | Age: 81
Discharge: HOME | End: 2021-08-18
Attending: SURGERY | Admitting: SURGERY
Payer: MEDICARE

## 2021-08-18 VITALS
TEMPERATURE: 97.4 F | RESPIRATION RATE: 16 BRPM | HEART RATE: 114 BPM | SYSTOLIC BLOOD PRESSURE: 116 MMHG | DIASTOLIC BLOOD PRESSURE: 78 MMHG

## 2021-08-18 DIAGNOSIS — L97.422 DIABETIC ULCER OF LEFT HEEL ASSOCIATED WITH TYPE 2 DIABETES MELLITUS, WITH FAT LAYER EXPOSED (H): ICD-10-CM

## 2021-08-18 DIAGNOSIS — E11.621 DIABETIC ULCER OF LEFT HEEL ASSOCIATED WITH TYPE 2 DIABETES MELLITUS, WITH FAT LAYER EXPOSED (H): ICD-10-CM

## 2021-08-18 PROCEDURE — 11042 DBRDMT SUBQ TIS 1ST 20SQCM/<: CPT | Performed by: PHYSICIAN ASSISTANT

## 2021-08-18 PROCEDURE — 11042 DBRDMT SUBQ TIS 1ST 20SQCM/<: CPT

## 2021-08-18 NOTE — DISCHARGE INSTRUCTIONS
[facility]  [address] [place] [address]    Call us at [phone] if you have any questions about your wounds, have redness or swelling around your wound, have a fever of 101 or greater or if you have any other problems or concerns. We answer the phone Monday through Friday 8 am to 4 pm, please leave a message as we check the voicemail frequently throughout the day.     Ross Caballero      1940    Wound Dressing Change:Left Heel  Cleanse wound and surrounding skin with: prophase protocol  Cover wound with Endoform Antimicrobial then Ioplex, Edemawear and 2 layer Coflex wrap  Change dressing weekly at [facility]    After your visit with Dr. Sofia apply Ioplex to wound, then apply edemawear toes to knee and cover with gauze/roll gauze over the edemawear and change daily.    Please call [facility] [phone] ([address] [place], [place]) to schedule your MRI of your left heel appointment if you have not heard from them in two business days.       June Campbell PA-C. August 18, 2021    Wound Clinic follow up as scheduled    If you had a positive experience please indicate that on your patient satisfaction survey form that [facility] will be sending you.    It was a pleasure meeting with you today.  Thank you for allowing me and my team the privilege of caring for you today.  YOU are the reason we are here, and I truly hope we provided you with the excellent service you deserve.  Please let us know if there is anything else we can do for you so that we can be sure you are leaving completely satisfied with your care experience.      If you have any billing related questions please call the [facility] Business office at [phone]. The clinic staff does not handle billing related matters.

## 2021-08-18 NOTE — PROGRESS NOTES
[facility]    ASSESSMENT:   1. 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 Concern for lack of progress, will order MRI today to rule out osteomyelitis  2. appt with Dr. Sofia on Monday to assess for biomechanics and eval for possible surgical intervention  3. Submit PA for EpiFix today  4. Two layer with Ioplex today, will give him piece of Ioplex to replace on Monday after his visit with Dr. Sofia, can leave this on until his appt next Wednesday  5. Minimal ambulation, provided rx for walker to aid with this    HISTORY OF PRESENT ILLNESS:   Ross Caballero is a 81 year old male with type 2 DM, polycystic kidney dz, prior prostate ca and chronic renal insufficiency with a left plantar diabetic heel ulcer who returns today. He has been seeing Dr. Anand regularly for total contact casting.    7/19/21: 1st presented to our clinic. Wound has been present for several months.    7/21/21: ABIs appear normal with triphasic waveforms  7/28/21: TCC initiated  8/12/21: TCC removed due to concern for lack of improvement. Placed in 2-layer wrap with Endoform and Ioplex. Referral made to Dr. Sofia for evaluation of biomechanics and whether any surgical intervention would be of benefit.   8/18/21: Returns today, wound is slightly larger. Has appt with Dr. Sofia on 8/23.       PHYSICAL EXAM:  GENERAL: Patient is alert and oriented and in no acute distress  INTEGUMENTARY:   Wound (used by OP [facility] only) 07/19/21 1048 Left (Active)   Thickness/Stage full thickness 08/18/21 1000   Dressing Appearance moist drainage 08/18/21 1000   Base white 08/18/21 1000   Periwound dry;intact 08/18/21 1000   Periwound Temperature warm 08/18/21 1000   Periwound Skin Turgor firm 08/18/21 1000   Edges callused 08/18/21 1000   Length (cm) 1.2 08/18/21 1000   Width (cm) 0.9 08/18/21 1000   Depth (cm) 0.5 08/18/21 1000   Wound (cm^2) 1.08 cm^2 08/18/21 1000   Wound Volume (cm^3) 0.54 cm^3 08/18/21  1000   Wound healing % -332 08/18/21 1000   Drainage Characteristics/Odor serosanguineous 08/18/21 1000   Drainage Amount moderate 08/18/21 1000   Care, Wound debrided 08/18/21 1000       PROCEDURE: 4% topical lidocaine was applied to the wound by nursing staff. Patient was determined to be capable of making their own medical decisions and informed consent was obtained. Using a 15 blade a surgical debridement was performed down to and including subcutaneous tissue of <20 cm2. Hemostasis was achieved with pressure. The patient tolerated the procedure well.      ENID MALDONADO PA-C

## 2021-08-23 ENCOUNTER — TELEPHONE (OUTPATIENT)
Dept: WOUND CARE | Facility: CLINIC | Age: 81
End: 2021-08-23

## 2021-08-23 NOTE — TELEPHONE ENCOUNTER
Call placed to pt again and able to speak with him. Dr. Sofia was able to assist him with [facility]. He has an MRI scheduled for 8/27/21. No further questions or concerns.

## 2021-08-23 NOTE — TELEPHONE ENCOUNTER
Patient called and said [facility] needs an authorization for [facility] for him to have an MRI.  He would like to be called at .

## 2021-08-25 ENCOUNTER — HOSPITAL ENCOUNTER (OUTPATIENT)
Dept: WOUND CARE | Facility: CLINIC | Age: 81
Discharge: HOME | End: 2021-08-25
Attending: SURGERY | Admitting: SURGERY
Payer: MEDICARE

## 2021-08-25 VITALS — DIASTOLIC BLOOD PRESSURE: 83 MMHG | TEMPERATURE: 98.1 F | SYSTOLIC BLOOD PRESSURE: 154 MMHG | HEART RATE: 110 BPM

## 2021-08-25 DIAGNOSIS — E11.621 DIABETIC ULCER OF LEFT HEEL ASSOCIATED WITH TYPE 2 DIABETES MELLITUS, WITH FAT LAYER EXPOSED (H): ICD-10-CM

## 2021-08-25 DIAGNOSIS — L97.422 DIABETIC ULCER OF LEFT HEEL ASSOCIATED WITH TYPE 2 DIABETES MELLITUS, WITH FAT LAYER EXPOSED (H): ICD-10-CM

## 2021-08-25 PROCEDURE — 97597 DBRDMT OPN WND 1ST 20 CM/<: CPT | Performed by: PHYSICIAN ASSISTANT

## 2021-08-25 PROCEDURE — 97597 DBRDMT OPN WND 1ST 20 CM/<: CPT

## 2021-08-25 NOTE — DISCHARGE INSTRUCTIONS
[facility]  [address] [address]    Call us at [phone] if you have any questions about your wounds, have redness or swelling around your wound, have a fever of 101 or greater or if you have any other problems or concerns. We answer the phone Monday through Friday 8 am to 4 pm, please leave a message as we check the voicemail frequently throughout the day.     Ross Caballero      1940    Wound Dressing Change:Left Heel  Cleanse wound and surrounding skin with: prophase protocol  Cover wound with Endoform then Ioplex, Edemawear and 2 layer Coflex wrap  Change dressing weekly at [facility]    It is very important to follow your healthcare providers instructions. Studies indicate when compression therapy is applied as directed, healing may occur faster and more completely. Do Not remove CoFlex unless your healthcare provider tells you to remove it.  Helpful Tips  Your bandage may feel tight at first. This is normal. When the swelling goes down, it will not feel as tight.  Wear the stocking that comes with the system over the bandage to help you put on shoes and clothing  Wear comfortable shoes and walk regularly. Walking will improve your circulation which will improve healing.  Keep your bandage dry.   CoFlex maybe left on your leg for up to 7 days. After that your doctor or nurse will apply a new CoFlex.   Call your doctor or nurse if the bandage gets wet, slips down or if you have pain, tingling, numbness or discoloration.      June Campbell PA-C. August 25, 2021    Wound Clinic follow up as scheduled    If you had a positive experience please indicate that on your patient satisfaction survey form that [facility] will be sending you.    It was a pleasure meeting with you today.  Thank you for allowing me and my team the privilege of caring for you today.  YOU are the reason we are here, and I truly hope we provided you with the excellent  service you deserve.  Please let us know if there is anything else we can do for you so that we can be sure you are leaving completely satisfied with your care experience.      If you have any billing related questions please call the [facility] Business office at [phone]. The clinic staff does not handle billing related matters.

## 2021-08-25 NOTE — PROGRESS NOTES
[place] [facility]    ASSESSMENT:   1. 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  2. epifix was approved, available for use if determined to be appropriate, will hold at this time due to pending MRI  3. Two layer with Endoform (plain) and Ioplex today  4. Minimal ambulation, utilize walker to aid with this    HISTORY OF PRESENT ILLNESS:   Ross Caballero is a 81 year old male with type 2 DM, polycystic kidney dz, prior prostate ca and chronic renal insufficiency with a left plantar diabetic heel ulcer who returns today. He has been seeing Dr. Anand regularly for total contact casting.    7/19/21: 1st presented to our clinic. Wound has been present for several months.    7/21/21: ABIs appear normal with triphasic waveforms  7/28/21: TCC initiated  8/12/21: TCC removed due to concern for lack of improvement. Placed in 2-layer wrap with Endoform and Ioplex. Referral made to Dr. Sofia for evaluation of biomechanics and whether any surgical intervention would be of benefit.   8/18/21: Returns today, wound is slightly larger. Has appt with Dr. Sofia on 8/23. Placed in 2-layer wrap.   08/25/21: Returns for follow-up. Wound improved since last week. Had visit with Dr. Sofia who recommended procedure on achilles and tendon in toe. Has MRI scheduled at [facility] on Friday and will determine next steps from there.      PHYSICAL EXAM:  GENERAL: Patient is alert and oriented and in no acute distress  INTEGUMENTARY:   Wound (used by OP [facility] only) 07/19/21 1048 Left (Active)   Thickness/Stage full thickness 08/25/21 0958   Dressing Appearance moist drainage 08/25/21 0958   Base granulating 08/25/21 0958   Periwound intact 08/25/21 0958   Periwound Temperature warm 08/25/21 0958   Periwound Skin Turgor soft 08/25/21 0958   Edges callused 08/25/21 0958   Length (cm) 1 08/25/21 0958   Width (cm) 0.6 08/25/21 0958   Depth (cm) 0.5 08/25/21 0958    Wound (cm^2) 0.6 cm^2 08/25/21 0958   Wound Volume (cm^3) 0.3 cm^3 08/25/21 0958   Wound healing % -140 08/25/21 0958   Drainage Characteristics/Odor serosanguineous 08/25/21 0958   Drainage Amount moderate 08/25/21 0958   Care, Wound debrided 08/25/21 0958       PROCEDURE:4% topical lidocaine was applied to the wound by the nursing staff. Patient was determined to be capable of making their own medical decisions and informed consent was obtained. Using a 15 blade a selective debridement of non-viable tissue was performed of <20 cm. Hemostasis was achieved with pressure. The patient tolerated the procedure well.        ENID MALDONADO PA-C

## 2021-08-27 ENCOUNTER — TRANSFERRED RECORDS (OUTPATIENT)
Dept: HEALTH INFORMATION MANAGEMENT | Facility: CLINIC | Age: 81
End: 2021-08-27

## 2021-09-01 ENCOUNTER — HOSPITAL ENCOUNTER (OUTPATIENT)
Dept: WOUND CARE | Facility: CLINIC | Age: 81
Discharge: HOME | End: 2021-09-01
Attending: SURGERY | Admitting: SURGERY
Payer: MEDICARE

## 2021-09-01 VITALS
HEART RATE: 107 BPM | DIASTOLIC BLOOD PRESSURE: 86 MMHG | RESPIRATION RATE: 18 BRPM | SYSTOLIC BLOOD PRESSURE: 151 MMHG | TEMPERATURE: 97.5 F

## 2021-09-01 DIAGNOSIS — L97.422 DIABETIC ULCER OF LEFT HEEL ASSOCIATED WITH TYPE 2 DIABETES MELLITUS, WITH FAT LAYER EXPOSED (H): ICD-10-CM

## 2021-09-01 DIAGNOSIS — E11.621 DIABETIC ULCER OF LEFT HEEL ASSOCIATED WITH TYPE 2 DIABETES MELLITUS, WITH FAT LAYER EXPOSED (H): ICD-10-CM

## 2021-09-01 PROCEDURE — 15004 WOUND PREP F/N/HF/G: CPT | Performed by: PHYSICIAN ASSISTANT

## 2021-09-01 PROCEDURE — 11042 DBRDMT SUBQ TIS 1ST 20SQCM/<: CPT | Performed by: PHYSICIAN ASSISTANT

## 2021-09-01 PROCEDURE — 15275 SKIN SUB GRAFT FACE/NK/HF/G: CPT | Performed by: PHYSICIAN ASSISTANT

## 2021-09-01 PROCEDURE — 11042 DBRDMT SUBQ TIS 1ST 20SQCM/<: CPT

## 2021-09-01 PROCEDURE — 15275 SKIN SUB GRAFT FACE/NK/HF/G: CPT

## 2021-09-01 NOTE — PROGRESS NOTES
[facility]    ASSESSMENT:   1. 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 #1 application of EpiFix applied today with SilverCel, Mepitel, EdemaWear, KerraMax and 2-layer CoFlex applied  2. Minimal ambulation, utilize walker to aid with this  3. Follow-up 1 week    HISTORY OF PRESENT ILLNESS:   Ross Caballero is a 81 year old male with type 2 DM, polycystic kidney dz, prior prostate ca and chronic renal insufficiency with a left plantar diabetic heel ulcer who returns today. He has been seeing Dr. nAand regularly for total contact casting.    7/19/21: 1st presented to our clinic. Wound has been present for several months.    7/21/21: ABIs appear normal with triphasic waveforms  7/28/21: TCC initiated  8/12/21: TCC removed due to concern for lack of improvement. Placed in 2-layer wrap with Endoform and Ioplex. Referral made to Dr. Sofia for evaluation of biomechanics and whether any surgical intervention would be of benefit.   8/18/21: Returns today, wound is slightly larger. Has appt with Dr. Sofia on 8/23. Placed in 2-layer wrap.   08/25/21: Returns for follow-up. Wound improved since last week. Had visit with Dr. Sofia who recommended procedure on achilles and tendon in toe. Has MRI scheduled at [facility] on Friday and will determine next steps from there.   9/1/21: Returns for follow-up. Last week we applied Endoform and Ioplex under a 2-layer wrap. Underwent MRI since last visit which was negative for osteomyelitis. Wound continues to improve.      PHYSICAL EXAM:  GENERAL: Patient is alert and oriented and in no acute distress  INTEGUMENTARY:   Wound (used by [facility] only) 07/19/21 1048 Left (Active)   Thickness/Stage full thickness 09/01/21 1020   Dressing Appearance moist drainage 09/01/21 1020   Base granulating 09/01/21 1020   Periwound intact 09/01/21 1020   Periwound Temperature warm 09/01/21 1020   Periwound Skin Turgor soft 09/01/21 1020    Edges callused 09/01/21 1020   Length (cm) 0.9 09/01/21 1020   Width (cm) 0.4 09/01/21 1020   Depth (cm) 0.3 09/01/21 1020   Wound (cm^2) 0.36 cm^2 09/01/21 1020   Wound Volume (cm^3) 0.11 cm^3 09/01/21 1020   Wound healing % -44 09/01/21 1020   Drainage Characteristics/Odor serosanguineous 09/01/21 1020   Drainage Amount moderate 09/01/21 1020   Care, Wound debrided;other (see comments) 09/01/21 1020   Dressing Care dressing applied 09/01/21 1020       Wound (used by [facility] only) 07/19/21 1048 Left (Active)   Thickness/Stage full thickness 09/01/21 1020   Dressing Appearance moist drainage 09/01/21 1020   Base granulating 09/01/21 1020   Periwound intact 09/01/21 1020   Periwound Temperature warm 09/01/21 1020   Periwound Skin Turgor soft 09/01/21 1020   Edges callused 09/01/21 1020   Length (cm) 0.9 09/01/21 1020   Width (cm) 0.4 09/01/21 1020   Depth (cm) 0.3 09/01/21 1020   Wound (cm^2) 0.36 cm^2 09/01/21 1020   Wound Volume (cm^3) 0.11 cm^3 09/01/21 1020   Wound healing % -44 09/01/21 1020   Drainage Characteristics/Odor serosanguineous 09/01/21 1020   Drainage Amount moderate 09/01/21 1020   Care, Wound debrided;other (see comments) 09/01/21 1020   Dressing Care dressing applied 09/01/21 1020          PROCEDURE:4% topical lidocaine was applied to the wound by the nursing staff. Patient was determined to be capable of making their own medical decisions and informed consent was obtained. Using a 15 blade a surgical debridement was performed down to and including subcutaneous tissue of <20 cm. Hemostasis was achieved with pressure. Wound bed was cleansed with Vashe. Tincture of benzoin used on periwound, 14mm disc of EpiFix applied to wound bed, the entire disc was used without any remainder, layer of SilverCel applied over the Epifix, Mepitel and steri strips was used to secure the dressings in place. The patient tolerated the procedure well.      ENID MALDONADO PA-C

## 2021-09-01 NOTE — DISCHARGE INSTRUCTIONS
[facility]  [address] [address]    Call us at [phone] if you have any questions about your wounds, have redness or swelling around your wound, have a fever of 101 or greater or if you have any other problems or concerns. We answer the phone Monday through Friday 8 am to 4 pm, please leave a message as we check the voicemail frequently throughout the day.     Ross Caballero      1940    Wound Dressing Change:Left Heel  Cleanse wound and surrounding skin with: prophase protocol  Epifix applied to wound then covered with mepitel and silvercel, then apply Edemawear and 2 layer Coflex wrap  Change dressing weekly at [facility]     June Campbell PA-C. September 1, 2021    Wound Clinic follow up as scheduled    If you had a positive experience please indicate that on your patient satisfaction survey form that [facility] will be sending you.    It was a pleasure meeting with you today.  Thank you for allowing me and my team the privilege of caring for you today.  YOU are the reason we are here, and I truly hope we provided you with the excellent service you deserve.  Please let us know if there is anything else we can do for you so that we can be sure you are leaving completely satisfied with your care experience.      If you have any billing related questions please call the [facility] Business office at [phone]. The clinic staff does not handle billing related matters.

## 2021-09-03 NOTE — ADDENDUM NOTE
Encounter addended by: June Campbell PA-C on: 9/3/2021 9:20 AM   Actions taken: Clinical Note Signed

## 2021-09-04 ENCOUNTER — TRANSFERRED RECORDS (OUTPATIENT)
Dept: HEALTH INFORMATION MANAGEMENT | Facility: CLINIC | Age: 81
End: 2021-09-04
Payer: MEDICARE

## 2021-09-08 ENCOUNTER — HOSPITAL ENCOUNTER (OUTPATIENT)
Dept: WOUND CARE | Facility: CLINIC | Age: 81
Discharge: HOME | End: 2021-09-08
Attending: SURGERY | Admitting: SURGERY
Payer: MEDICARE

## 2021-09-08 VITALS
RESPIRATION RATE: 18 BRPM | HEART RATE: 116 BPM | DIASTOLIC BLOOD PRESSURE: 81 MMHG | TEMPERATURE: 97.6 F | SYSTOLIC BLOOD PRESSURE: 119 MMHG

## 2021-09-08 DIAGNOSIS — L97.422 DIABETIC ULCER OF LEFT HEEL ASSOCIATED WITH TYPE 2 DIABETES MELLITUS, WITH FAT LAYER EXPOSED (H): ICD-10-CM

## 2021-09-08 DIAGNOSIS — E11.621 DIABETIC ULCER OF LEFT HEEL ASSOCIATED WITH TYPE 2 DIABETES MELLITUS, WITH FAT LAYER EXPOSED (H): ICD-10-CM

## 2021-09-08 PROCEDURE — 29581 APPL MULTLAYER CMPRN SYS LEG: CPT

## 2021-09-08 PROCEDURE — 15275 SKIN SUB GRAFT FACE/NK/HF/G: CPT

## 2021-09-08 PROCEDURE — 99213 OFFICE O/P EST LOW 20 MIN: CPT | Performed by: SURGERY

## 2021-09-08 NOTE — DISCHARGE INSTRUCTIONS
[facility]  [address] [address]    Call us at [phone] if you have any questions about your wounds, have redness or swelling around your wound, have a fever of 101 or greater or if you have any other problems or concerns. We answer the phone Monday through Friday 8 am to 4 pm, please leave a message as we check the voicemail frequently throughout the day.     Ross VANI Caballero      1940    Wound Dressing Change:Left Heel  Cleanse wound and surrounding skin with: prophase protocol  Cover wound with epifix cover with mepitel, Edemawear foam pad and 2 layer Coflex wrap  Change dressing weekly at [facility]  It is very important to follow your healthcare providers instructions. Studies indicate  when compression therapy is applied as directed, healing may occur faster and more  completely. Do Not remove CoFlex unless your healthcare provider tells you to remove  it.  Helpful Tips  Your bandage may feel tight at first. This is normal. When the swelling goes down, it will  not feel as tight.  Wear the stocking that comes with the system over the bandage to help you put on  shoes and clothing  Wear comfortable shoes and walk regularly. Walking will improve your circulation which  will improve healing.  Keep your bandage dry.  CoFlex maybe left on your leg for up to 7 days. After that your doctor or nurse will  apply a new CoFlex.  Call your doctor or nurse if the bandage gets wet, slips down or if you have pain,  tingling, numbness or discoloration     DIONTE Anand M.D.. September 8, 2021      If you had a positive experience please indicate on your patient satisfaction survey form that [facility] will be sending you.    If you have any billing related questions please call the [facility] Business office at [phone]. The clinic staff does not handle billing related matters.

## 2021-09-08 NOTE — PROGRESS NOTES
[facility] [place] Progress Note    Subject: Ross Caballero has canceled his appoint with Dr. Sofia to discuss left Achilles tendon lengthening for management of left heel ulceration, he has a urology issue which is being dealt with and his wife has a medical issue, he will be rescheduling with Dr. Sofia.    Patient Active Problem List   Diagnosis     Calculus of kidney     Polycystic kidney disease     Essential hypertension     Advanced directives, counseling/discussion     Chronic kidney disease, stage III (moderate)     Type 2 diabetes mellitus with stage 3 chronic kidney disease, without long-term current use of insulin (H)     Prostate cancer (H)     Morbid obesity (H)     Lower urinary tract symptoms     VARUN (acute kidney injury) (H)     BPH (benign prostatic hyperplasia)     Hydroureteronephrosis     Hyperkalemia     Male urinary stress incontinence     Diabetic ulcer of left heel associated with type 2 diabetes mellitus, 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)     Past Medical History:   Diagnosis Date     Benign essential hypertension      BPH (benign prostatic hyperplasia)     flomax      Calculus of kidney 2002    with lithrotripsy      Diabetes mellitus (H)      Melanoma (H)      Obese      Osteoarthritis, knee     has had synvisc type shot      Osteomyelitis (H)     toe amputation     Prostate cancer (H) 10/18/2019     Pyelonephritis 7/20/2016     Exam:  /81   Pulse 116   Temp 97.6  F (36.4  C) (Temporal)   Resp 18   Wound (used by [facility] only) 07/19/21 1048 Left (Active)   Dressing Appearance moist drainage 09/08/21 1000   Length (cm) 0.7 09/08/21 1000   Width (cm) 0.3 09/08/21 1000   Depth (cm) 0.3 09/08/21 1000   Wound (cm^2) 0.21 cm^2 09/08/21 1000   Wound Volume (cm^3) 0.06 cm^3 09/08/21 1000   Wound healing % 16 09/08/21 1000   Drainage Characteristics/Odor serosanguineous 09/08/21 1000   Drainage Amount moderate 09/08/21 1000     Palpable left dorsalis pedis pulse, left heel  ulceration, see photodocumentation, no undermining, no bone or tendon exposure, approximately 20% improved, no cellulitis, no odor, no drainage.        Impression: Chronic left heel ulceration, left Achilles tendon shortening    Plan: Patient will contact Dr. Sofia to arrange for new surgical date for left foot, Achilles tendon thinning.  We will dress the wounds with primary dressing Zorflex activated carbon cloth, EdemaWear, offloading heel with use of a knee scooter, he did not tolerate a total contact cast.  Brachial indices July 21, 2021 were within normal limits, no evidence of peripheral arterial diseasePatient will return to the clinic in 4 weeks time    Mark Anthony Anand MD on 9/8/2021 at 11:19 AM

## 2021-09-15 ENCOUNTER — HOSPITAL ENCOUNTER (OUTPATIENT)
Dept: WOUND CARE | Facility: CLINIC | Age: 81
Discharge: HOME | End: 2021-09-15
Attending: SURGERY | Admitting: SURGERY
Payer: MEDICARE

## 2021-09-15 VITALS
DIASTOLIC BLOOD PRESSURE: 96 MMHG | TEMPERATURE: 97.3 F | HEART RATE: 106 BPM | SYSTOLIC BLOOD PRESSURE: 127 MMHG | RESPIRATION RATE: 18 BRPM

## 2021-09-15 DIAGNOSIS — E11.621 DIABETIC ULCER OF LEFT HEEL ASSOCIATED WITH TYPE 2 DIABETES MELLITUS, WITH FAT LAYER EXPOSED (H): ICD-10-CM

## 2021-09-15 DIAGNOSIS — L97.422 DIABETIC ULCER OF LEFT HEEL ASSOCIATED WITH TYPE 2 DIABETES MELLITUS, WITH FAT LAYER EXPOSED (H): ICD-10-CM

## 2021-09-15 PROCEDURE — 99213 OFFICE O/P EST LOW 20 MIN: CPT | Mod: 25 | Performed by: SURGERY

## 2021-09-15 PROCEDURE — 29581 APPL MULTLAYER CMPRN SYS LEG: CPT | Performed by: SURGERY

## 2021-09-15 NOTE — PROGRESS NOTES
[facility] [place] Progress Note    Subject: Ross Caballero wound left heel closed, initial consult July 19th, 2021  Patient Active Problem List   Diagnosis     Calculus of kidney     Polycystic kidney disease     Essential hypertension     Advanced directives, counseling/discussion     Chronic kidney disease, stage III (moderate)     Type 2 diabetes mellitus with stage 3 chronic kidney disease, without long-term current use of insulin (H)     Prostate cancer (H)     Morbid obesity (H)     Lower urinary tract symptoms     VARUN (acute kidney injury) (H)     BPH (benign prostatic hyperplasia)     Hydroureteronephrosis     Hyperkalemia     Male urinary stress incontinence     Diabetic ulcer of left heel associated with type 2 diabetes mellitus, 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)     Past Medical History:   Diagnosis Date     Benign essential hypertension      BPH (benign prostatic hyperplasia)     flomax      Calculus of kidney 2002    with lithrotripsy      Diabetes mellitus (H)      Melanoma (H)      Obese      Osteoarthritis, knee     has had synvisc type shot      Osteomyelitis (H)     toe amputation     Prostate cancer (H) 10/18/2019     Pyelonephritis 7/20/2016     Exam:  BP (!) 127/96   Pulse 106   Temp 97.3  F (36.3  C) (Temporal)   Resp 18   Wound (used by OP [facility] only) 07/19/21 1048 Left (Active)   Dressing Appearance moist drainage 09/15/21 1000   Length (cm) 1.6 09/15/21 1000   Width (cm) 1.5 09/15/21 1000   Depth (cm) 0.2 09/15/21 1000   Wound (cm^2) 2.4 cm^2 09/15/21 1000   Wound Volume (cm^3) 0.48 cm^3 09/15/21 1000   Wound healing % -860 09/15/21 1000     Closed left heel wound palpable pulse, posterior tibial      Impression: Closed wound left heel    Plan: Continue 1 additional week of EdemaWear and 2 layer wrap to decrease risk of early recidivism rate then ceramide-based lotion on the foot on a daily basis and mild compression stockings.    Patient will return to the clinic in 1 weeks  time    Mark Anthony Anand MD on 9/15/2021 at 11:42 AM

## 2021-09-22 ENCOUNTER — HOSPITAL ENCOUNTER (OUTPATIENT)
Dept: WOUND CARE | Facility: CLINIC | Age: 81
Discharge: HOME | End: 2021-09-22
Attending: SURGERY | Admitting: SURGERY
Payer: MEDICARE

## 2021-09-22 VITALS
DIASTOLIC BLOOD PRESSURE: 110 MMHG | RESPIRATION RATE: 18 BRPM | TEMPERATURE: 97.4 F | HEART RATE: 102 BPM | SYSTOLIC BLOOD PRESSURE: 127 MMHG

## 2021-09-22 DIAGNOSIS — L97.422 DIABETIC ULCER OF LEFT HEEL ASSOCIATED WITH TYPE 2 DIABETES MELLITUS, WITH FAT LAYER EXPOSED (H): ICD-10-CM

## 2021-09-22 DIAGNOSIS — E11.621 DIABETIC ULCER OF LEFT HEEL ASSOCIATED WITH TYPE 2 DIABETES MELLITUS, WITH FAT LAYER EXPOSED (H): ICD-10-CM

## 2021-09-22 PROCEDURE — 99213 OFFICE O/P EST LOW 20 MIN: CPT | Performed by: SURGERY

## 2021-09-22 PROCEDURE — G0463 HOSPITAL OUTPT CLINIC VISIT: HCPCS

## 2021-09-22 NOTE — PROGRESS NOTES
[facility] [place] Progress Note    Subject: Ross Caballero closed left heel wound, pronation left foot, neuropathy, adult-onset diabetes    Patient Active Problem List   Diagnosis     Calculus of kidney     Polycystic kidney disease     Essential hypertension     Advanced directives, counseling/discussion     Chronic kidney disease, stage III (moderate)     Type 2 diabetes mellitus with stage 3 chronic kidney disease, without long-term current use of insulin (H)     Prostate cancer (H)     Morbid obesity (H)     Lower urinary tract symptoms     VARUN (acute kidney injury) (H)     BPH (benign prostatic hyperplasia)     Hydroureteronephrosis     Hyperkalemia     Male urinary stress incontinence     Diabetic ulcer of left heel associated with type 2 diabetes mellitus, 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)     Past Medical History:   Diagnosis Date     Benign essential hypertension      BPH (benign prostatic hyperplasia)     flomax      Calculus of kidney 2002    with lithrotripsy      Diabetes mellitus (H)      Melanoma (H)      Obese      Osteoarthritis, knee     has had synvisc type shot      Osteomyelitis (H)     toe amputation     Prostate cancer (H) 10/18/2019     Pyelonephritis 7/20/2016     Exam:  BP (!) 127/110   Pulse 102   Temp 97.4  F (36.3  C) (Temporal)   Resp 18      Closed left heel, hypertrophic dermis        Impression: Closed left heel, diabetes, neuropathy, left foot pronation    Plan: Discussed appropriate shoe wear, he will follow-up for insert creation in shoe evaluation, urea cream application to left heel twice daily, utilization of a lightweight compression sock, recommended either evaluation at [facility] or [employer] [facility].  Daily would utilize micronized purified flavonoid fraction lifelong.  We will follow-up as needed.    Mark Anthony Anand MD on 9/22/2021 at 10:11 AM

## 2021-09-22 NOTE — DISCHARGE INSTRUCTIONS
[facility]  [address] [facility] [address] [place] [address]  Appointment Phone [phone]     Ross Caballero      1940  Your wound is Healed!! Dressings are no longer required. Apply UREA cream to foot daily.  Apply compression sock daily for the rest of your life.      DIONTE Anand M.D.. September 22, 2021  Take special care  Here are tips for taking special care of your feet:    Inspect your feet daily for problems such as redness, blisters, cracks, dry skin, or numbness. Use a mirror to see the bottoms of your feet. Or, ask for help.    Manage your diabetes. Monitor and control your blood sugar. Take all your medicines as prescribed.    Avoid walking barefoot, even indoors. Always wear socks inside your shoes.     Wash your feet with warm water and mild soap. Dry well, especially between toes.    Don t treat corns or calluses yourself. Talk to your healthcare provider or podiatrist (a healthcare provider who specializes in foot care) if you need assistance trimming your toenails.    Use moisturizing cream or lotion if you have dry skin, but don t use it between toes.    Don t use heating pads on your feet. If you have neuropathy, you could get a burn and not feel it.    Stop smoking. Smoking restricts blood flow and can make it harder for wounds to heal.    Don't use sharp blades to trim your nails. Use a nail clipper and file instead.   Have regular checkups  Foot problems can develop quickly. So be sure to follow your healthcare team s schedule for regular checkups. During office visits, take off your shoes and socks as soon as you get in the exam room. Ask your healthcare provider to examine your feet for problems. This will make it easier to find and treat small skin irritations before they get worse. Regular checkups can also help keep track of the blood flow and feeling in your feet. If you have neuropathy, you may need to have checkups more often.  Wear proper  footwear  Wearing proper footwear is very important. If areas of your feet have been damaged by too much pressure, your healthcare provider may recommend changing your footwear. In some cases, avoiding high heels or tight work boots may be all that s needed. Or, your healthcare provider may recommend special shoes or custom inserts. These help protect your feet and keep existing irritations from getting worse. If you need special footwear, ask your healthcare provider if you qualify for Medicare's custom-molded and extra-depth diabetic shoe and insert program.   Make sure shoes and socks fit  Any pair of shoes--new or old--should feel comfortable as soon as you put them on. There shouldn t be any rubbing when you walk. Wear the right shoe for any activity. For instance, a running shoe is designed to keep your feet injury-free while jogging. Buy shoes at the end of the day, when your feet are larger. Make sure they provide support without feeling too loose. Make sure your socks fit, too. Wear soft, seamless, well-padded socks for activity. Cotton or microfiber socks are best to help to absorb sweat. To protect your feet, avoid shoes that are open-toed or open-heeled. If you have questions about what kinds of shoes and socks are best, talk to your healthcare team.

## 2021-09-22 NOTE — PROGRESS NOTES
Patient arrived for wound care visit. Certified Wound Care Nurse time spent evaluating patient record, completed a full evaluation and documented healed wound(s) & live-wound skin; provided recommendation based on treatment plan. Applied dressing, reviewed discharge instructions, patient education, and discussed plan of care with appropriate medical team staff members and patient and/or family members.

## 2021-09-29 ENCOUNTER — TRANSFERRED RECORDS (OUTPATIENT)
Dept: HEALTH INFORMATION MANAGEMENT | Facility: CLINIC | Age: 81
End: 2021-09-29

## 2021-10-04 ENCOUNTER — HEALTH MAINTENANCE LETTER (OUTPATIENT)
Age: 81
End: 2021-10-04

## 2021-10-05 ENCOUNTER — TRANSFERRED RECORDS (OUTPATIENT)
Dept: HEALTH INFORMATION MANAGEMENT | Facility: CLINIC | Age: 81
End: 2021-10-05

## 2021-10-08 ENCOUNTER — HOSPITAL ENCOUNTER (OUTPATIENT)
Dept: NUCLEAR MEDICINE | Facility: CLINIC | Age: 81
Setting detail: NUCLEAR MEDICINE
End: 2021-10-08
Attending: INTERNAL MEDICINE
Payer: MEDICARE

## 2021-10-08 DIAGNOSIS — C61 PRIMARY MALIGNANT NEOPLASM OF PROSTATE (H): ICD-10-CM

## 2021-10-08 PROCEDURE — A9503 TC99M MEDRONATE: HCPCS | Performed by: INTERNAL MEDICINE

## 2021-10-08 PROCEDURE — 78306 BONE IMAGING WHOLE BODY: CPT

## 2021-10-08 PROCEDURE — 343N000001 HC RX 343: Performed by: INTERNAL MEDICINE

## 2021-10-08 RX ORDER — TC 99M MEDRONATE 20 MG/10ML
25 INJECTION, POWDER, LYOPHILIZED, FOR SOLUTION INTRAVENOUS ONCE
Status: COMPLETED | OUTPATIENT
Start: 2021-10-08 | End: 2021-10-08

## 2021-10-08 RX ADMIN — TC 99M MEDRONATE 26.7 MCI.: 20 INJECTION, POWDER, LYOPHILIZED, FOR SOLUTION INTRAVENOUS at 12:07

## 2021-10-11 ENCOUNTER — TRANSFERRED RECORDS (OUTPATIENT)
Dept: HEALTH INFORMATION MANAGEMENT | Facility: CLINIC | Age: 81
End: 2021-10-11
Payer: MEDICARE

## 2021-10-14 DIAGNOSIS — Z11.59 ENCOUNTER FOR SCREENING FOR OTHER VIRAL DISEASES: ICD-10-CM

## 2021-10-18 ENCOUNTER — TRANSFERRED RECORDS (OUTPATIENT)
Dept: HEALTH INFORMATION MANAGEMENT | Facility: CLINIC | Age: 81
End: 2021-10-18
Payer: MEDICARE

## 2021-11-08 ENCOUNTER — OFFICE VISIT (OUTPATIENT)
Dept: FAMILY MEDICINE | Facility: CLINIC | Age: 81
End: 2021-11-08
Payer: MEDICARE

## 2021-11-08 VITALS
WEIGHT: 254.5 LBS | SYSTOLIC BLOOD PRESSURE: 120 MMHG | TEMPERATURE: 96.9 F | HEART RATE: 117 BPM | BODY MASS INDEX: 37.58 KG/M2 | OXYGEN SATURATION: 96 % | RESPIRATION RATE: 20 BRPM | DIASTOLIC BLOOD PRESSURE: 71 MMHG

## 2021-11-08 DIAGNOSIS — R33.8 BENIGN PROSTATIC HYPERPLASIA WITH URINARY RETENTION: ICD-10-CM

## 2021-11-08 DIAGNOSIS — Z01.810 PRE-OPERATIVE CARDIOVASCULAR EXAMINATION: Primary | ICD-10-CM

## 2021-11-08 DIAGNOSIS — N40.1 BENIGN PROSTATIC HYPERPLASIA WITH URINARY RETENTION: ICD-10-CM

## 2021-11-08 LAB — HGB BLD-MCNC: 13.6 G/DL (ref 13.3–17.7)

## 2021-11-08 PROCEDURE — 85018 HEMOGLOBIN: CPT | Performed by: INTERNAL MEDICINE

## 2021-11-08 PROCEDURE — 99214 OFFICE O/P EST MOD 30 MIN: CPT | Performed by: INTERNAL MEDICINE

## 2021-11-08 PROCEDURE — 36415 COLL VENOUS BLD VENIPUNCTURE: CPT | Performed by: INTERNAL MEDICINE

## 2021-11-08 NOTE — H&P (VIEW-ONLY)
[address], [address]  [facility] [address]  Phone: [phone]  Primary Provider: Martha Cr  Pre-op Performing Provider: MARTHA CR      PREOPERATIVE EVALUATION:  Today's date: 11/8/2021    Ross Caballero is a 81 year old male who presents for a preoperative evaluation.    Surgical Information:  Surgery/Procedure: CYSTOSCOPY, RIGHT RETROGRADE PYELOGRAM, POSSIBLE RIGHT STENT PLACEMENT  GREEN LIGHT LASER TRANSURETHRAL RESECTION PROSTATE  Surgery Location:  OR   Surgeon: Gurdeep Hickman MD  Surgery Date: 11/16/2021  Time of Surgery: 10:15 am   Where patient plans to recover: At home with family  Fax number for surgical facility: Note does not need to be faxed, will be available electronically in Epic.    Type of Anesthesia Anticipated: General    Assessment & Plan     The proposed surgical procedure is considered INTERMEDIATE risk.    Pre-operative cardiovascular examination  Benign prostatic hyperplasia with urinary retention  - Hemoglobin  -     Asymptomatic COVID-19 Virus (Coronavirus) by PCR; Future      Risks and Recommendations:  The patient has the following additional risks and recommendations for perioperative complications:   - No identified additional risk factors other than previously addressed    Medication Instructions:  Patient is to take all scheduled medications on the day of surgery    RECOMMENDATION:  APPROVAL GIVEN to proceed with proposed procedure, without further diagnostic evaluation.      Subjective     HPI related to upcoming procedure: patient Spenser Caballero is a very pleasant 81 year old male with BPH who presents to internal medicine clinic for a pre op cardiac evaluation for upcoming CYSTOSCOPY, RIGHT RETROGRADE PYELOGRAM, POSSIBLE RIGHT STENT PLACEMENT  GREEN LIGHT LASER TRANSURETHRAL RESECTION PROSTATE surgery for treatment of chronic BPH. No chest pain, headaches, fever or chills.    Preop Questions 11/4/2021   1. Have you ever  had a heart attack or stroke? No   2. Have you ever had surgery on your heart or blood vessels, such as a stent placement, a coronary artery bypass, or surgery on an artery in your head, neck, heart, or legs? No   3. Do you have chest pain with activity? No   4. Do you have a history of  heart failure? No   5. Do you currently have a cold, bronchitis or symptoms of other infection? No   6. Do you have a cough, shortness of breath, or wheezing? No   7. Do you or anyone in your family have previous history of blood clots? No   8. Do you or does anyone in your family have a serious bleeding problem such as prolonged bleeding following surgeries or cuts? No   9. Have you ever had problems with anemia or been told to take iron pills? No   10. Have you had any abnormal blood loss such as black, tarry or bloody stools? No   11. Have you ever had a blood transfusion? UNKNOWN    12. Are you willing to have a blood transfusion if it is medically needed before, during, or after your surgery? Yes   13. Have you or any of your relatives ever had problems with anesthesia? No   14. Do you have sleep apnea, excessive snoring or daytime drowsiness? No   15. Do you have any artifical heart valves or other implanted medical devices like a pacemaker, defibrillator, or continuous glucose monitor? No   16. Do you have artificial joints? No   17. Are you allergic to latex? No       Health Care Directive:  Patient does not have a Health Care Directive or Living Will: Discussed advance care planning with patient; information given to patient to review.      Review of Systems  Constitutional, neuro, ENT, endocrine, pulmonary, cardiac, gastrointestinal, genitourinary, musculoskeletal, integument and psychiatric systems are negative, except as otherwise noted.    Patient Active Problem List    Diagnosis Date Noted     Diabetic ulcer of left heel associated with type 2 diabetes mellitus, 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) 07/19/2021     Priority:  Medium     Prostate cancer (H) 10/18/2019     Priority: Medium     Morbid obesity (H) 10/18/2019     Priority: Medium     Male urinary stress incontinence 01/17/2019     Priority: Medium     Lower urinary tract symptoms 07/19/2018     Priority: Medium     VARUN (acute kidney injury) (H) 05/09/2018     Priority: Medium     Hydroureteronephrosis 05/09/2018     Priority: Medium     Type 2 diabetes mellitus with stage 3 chronic kidney disease, without long-term current use of insulin (H) 05/03/2018     Priority: Medium     Chronic kidney disease, stage III (moderate) (H) 10/28/2016     Priority: Medium     Advanced directives, counseling/discussion 10/27/2016     Priority: Medium     Advance Care Planning 10/27/2016: ACP Review of Chart / Resources Provided:  Reviewed chart for advance care plan.  Ross Ada has no plan or code status on file. Discussed available resources, patient declined at this time.  Added by Mabel Carrero             Essential hypertension 10/17/2016     Priority: Medium     Polycystic kidney disease 09/06/2016     Priority: Medium     BPH (benign prostatic hyperplasia) 07/29/2016     Priority: Medium     Hyperkalemia 07/29/2016     Priority: Medium     Calculus of kidney      Priority: Medium     with lithrotripsy         Past Medical History:   Diagnosis Date     Benign essential hypertension      BPH (benign prostatic hyperplasia)     flomax      Calculus of kidney 2002    with lithrotripsy      Diabetes mellitus (H)      Melanoma (H)      Obese      Osteoarthritis, knee     has had synvisc type shot      Osteomyelitis (H)     toe amputation     Prostate cancer (H) 10/18/2019     Pyelonephritis 7/20/2016     Past Surgical History:   Procedure Laterality Date     AMPUTATION of toe       COLONOSCOPY       CYSTOSCOPY FLEXIBLE, CYOABLATION PROSTATE N/A 9/27/2018    Procedure: CYSTOSCOPY FLEXIBLE, CRYOABLATION PROSTATE;  FLEXIBLE CYSTOSCOPY, CRYOABLATION OF THE PROSTATE ;  Surgeon: Nima Calvert  MD Gino;  Location: [facility] OR     GENITOURINARY SURGERY Left     Nephrectomy     HERNIA REPAIR      Umbilical hernia      melanoma removal       Current Outpatient Medications   Medication Sig Dispense Refill     acetaminophen (TYLENOL) 325 MG tablet Take 325-650 mg by mouth every 6 hours as needed for mild pain Patient reports taking ES Tylenol       Alcohol Swabs (ALCOHOL PADS) 70 % PADS 1 Application 2 times daily 100 each 3     blood glucose (NO BRAND SPECIFIED) lancets standard Use to test blood sugar 2 times daily or as directed. 100 each 3     blood glucose (NO BRAND SPECIFIED) test strip Check blood sugar twice a day 100 strip 3     blood glucose monitoring (NO BRAND SPECIFIED) meter device kit Use to test blood sugar 2 times daily or as directed. 1 kit 0     enzalutamide (XTANDI) 40 MG capsule        Leuprolide Acetate, 4 Month, (LUPRON DEPOT, 4-MONTH, IM)        ondansetron (ZOFRAN-ODT) 8 MG ODT tab        tamsulosin (FLOMAX) 0.4 MG capsule Take 2 capsules (0.8 mg) by mouth At Bedtime - Fasting physical due for further refills 180 capsule 1       Allergies   Allergen Reactions     No Known Allergies         Social History     Tobacco Use     Smoking status: Former Smoker     Quit date: 1970     Years since quittin.8     Smokeless tobacco: Never Used   Substance Use Topics     Alcohol use: Yes     Alcohol/week: 0.0 - 1.0 standard drinks     Comment: rare     Family History   Problem Relation Age of Onset     Coronary Artery Disease Father      Breast Cancer Sister      Breast Cancer Mother      History   Drug Use No         Objective     /71 (BP Location: Right arm, Patient Position: Sitting, Cuff Size: Adult Large)   Pulse 117   Temp 96.9  F (36.1  C) (Temporal)   Resp 20   Wt 115.4 kg (254 lb 8 oz)   SpO2 96%   BMI 37.58 kg/m      Physical Exam    GENERAL APPEARANCE: alert and no distress     EYES: EOMI,  PERRL     HENT: ear canals and TM's normal and nose and mouth without ulcers or  lesions     NECK: no adenopathy, no asymmetry, masses, or scars and thyroid normal to palpation     RESP: lungs clear to auscultation - no rales, rhonchi or wheezes     CV: regular rates and rhythm     ABDOMEN:  soft, nontender, no HSM or masses and bowel sounds normal     MS: extremities normal- no gross deformities noted     SKIN: no suspicious lesions or rashes     NEURO: Normal strength and tone, sensory exam grossly normal, mentation intact and speech normal     PSYCH: mentation appears normal. and affect normal/bright     LYMPHATICS: No cervical adenopathy    Recent Labs   Lab Test 06/17/21  0758   HGB 13.0*      CR 1.46*   A1C 7.4*        Diagnostics:  Recent Results (from the past 24 hour(s))   Hemoglobin    Collection Time: 11/08/21 11:10 AM   Result Value Ref Range    Hemoglobin 13.6 13.3 - 17.7 g/dL             Signed Electronically by: Martha Cr MD  Copy of this evaluation report is provided to requesting physician.

## 2021-11-08 NOTE — PROGRESS NOTES
[address], [address]  [facility] [address]  Phone: [phone]  Primary Provider: Martha Cr  Pre-op Performing Provider: MARTHA CR      PREOPERATIVE EVALUATION:  Today's date: 11/8/2021    Ross Caballero is a 81 year old male who presents for a preoperative evaluation.    Surgical Information:  Surgery/Procedure: CYSTOSCOPY, RIGHT RETROGRADE PYELOGRAM, POSSIBLE RIGHT STENT PLACEMENT  GREEN LIGHT LASER TRANSURETHRAL RESECTION PROSTATE  Surgery Location:  OR   Surgeon: Gurdeep Hickman MD  Surgery Date: 11/16/2021  Time of Surgery: 10:15 am   Where patient plans to recover: At home with family  Fax number for surgical facility: Note does not need to be faxed, will be available electronically in Epic.    Type of Anesthesia Anticipated: General    Assessment & Plan     The proposed surgical procedure is considered INTERMEDIATE risk.    Pre-operative cardiovascular examination  Benign prostatic hyperplasia with urinary retention  - Hemoglobin  -     Asymptomatic COVID-19 Virus (Coronavirus) by PCR; Future      Risks and Recommendations:  The patient has the following additional risks and recommendations for perioperative complications:   - No identified additional risk factors other than previously addressed    Medication Instructions:  Patient is to take all scheduled medications on the day of surgery    RECOMMENDATION:  APPROVAL GIVEN to proceed with proposed procedure, without further diagnostic evaluation.      Subjective     HPI related to upcoming procedure: patient Spenser Caballero is a very pleasant 81 year old male with BPH who presents to internal medicine clinic for a pre op cardiac evaluation for upcoming CYSTOSCOPY, RIGHT RETROGRADE PYELOGRAM, POSSIBLE RIGHT STENT PLACEMENT  GREEN LIGHT LASER TRANSURETHRAL RESECTION PROSTATE surgery for treatment of chronic BPH. No chest pain, headaches, fever or chills.    Preop Questions 11/4/2021   1. Have you ever  had a heart attack or stroke? No   2. Have you ever had surgery on your heart or blood vessels, such as a stent placement, a coronary artery bypass, or surgery on an artery in your head, neck, heart, or legs? No   3. Do you have chest pain with activity? No   4. Do you have a history of  heart failure? No   5. Do you currently have a cold, bronchitis or symptoms of other infection? No   6. Do you have a cough, shortness of breath, or wheezing? No   7. Do you or anyone in your family have previous history of blood clots? No   8. Do you or does anyone in your family have a serious bleeding problem such as prolonged bleeding following surgeries or cuts? No   9. Have you ever had problems with anemia or been told to take iron pills? No   10. Have you had any abnormal blood loss such as black, tarry or bloody stools? No   11. Have you ever had a blood transfusion? UNKNOWN    12. Are you willing to have a blood transfusion if it is medically needed before, during, or after your surgery? Yes   13. Have you or any of your relatives ever had problems with anesthesia? No   14. Do you have sleep apnea, excessive snoring or daytime drowsiness? No   15. Do you have any artifical heart valves or other implanted medical devices like a pacemaker, defibrillator, or continuous glucose monitor? No   16. Do you have artificial joints? No   17. Are you allergic to latex? No       Health Care Directive:  Patient does not have a Health Care Directive or Living Will: Discussed advance care planning with patient; information given to patient to review.      Review of Systems  Constitutional, neuro, ENT, endocrine, pulmonary, cardiac, gastrointestinal, genitourinary, musculoskeletal, integument and psychiatric systems are negative, except as otherwise noted.    Patient Active Problem List    Diagnosis Date Noted     Diabetic ulcer of left heel associated with type 2 diabetes mellitus, 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) 07/19/2021     Priority:  Medium     Prostate cancer (H) 10/18/2019     Priority: Medium     Morbid obesity (H) 10/18/2019     Priority: Medium     Male urinary stress incontinence 01/17/2019     Priority: Medium     Lower urinary tract symptoms 07/19/2018     Priority: Medium     VARUN (acute kidney injury) (H) 05/09/2018     Priority: Medium     Hydroureteronephrosis 05/09/2018     Priority: Medium     Type 2 diabetes mellitus with stage 3 chronic kidney disease, without long-term current use of insulin (H) 05/03/2018     Priority: Medium     Chronic kidney disease, stage III (moderate) (H) 10/28/2016     Priority: Medium     Advanced directives, counseling/discussion 10/27/2016     Priority: Medium     Advance Care Planning 10/27/2016: ACP Review of Chart / Resources Provided:  Reviewed chart for advance care plan.  Ross Ada has no plan or code status on file. Discussed available resources, patient declined at this time.  Added by Mabel Carrero             Essential hypertension 10/17/2016     Priority: Medium     Polycystic kidney disease 09/06/2016     Priority: Medium     BPH (benign prostatic hyperplasia) 07/29/2016     Priority: Medium     Hyperkalemia 07/29/2016     Priority: Medium     Calculus of kidney      Priority: Medium     with lithrotripsy         Past Medical History:   Diagnosis Date     Benign essential hypertension      BPH (benign prostatic hyperplasia)     flomax      Calculus of kidney 2002    with lithrotripsy      Diabetes mellitus (H)      Melanoma (H)      Obese      Osteoarthritis, knee     has had synvisc type shot      Osteomyelitis (H)     toe amputation     Prostate cancer (H) 10/18/2019     Pyelonephritis 7/20/2016     Past Surgical History:   Procedure Laterality Date     AMPUTATION of toe       COLONOSCOPY       CYSTOSCOPY FLEXIBLE, CYOABLATION PROSTATE N/A 9/27/2018    Procedure: CYSTOSCOPY FLEXIBLE, CRYOABLATION PROSTATE;  FLEXIBLE CYSTOSCOPY, CRYOABLATION OF THE PROSTATE ;  Surgeon: Nima Calvert  MD Gino;  Location: [facility] OR     GENITOURINARY SURGERY Left     Nephrectomy     HERNIA REPAIR      Umbilical hernia      melanoma removal       Current Outpatient Medications   Medication Sig Dispense Refill     acetaminophen (TYLENOL) 325 MG tablet Take 325-650 mg by mouth every 6 hours as needed for mild pain Patient reports taking ES Tylenol       Alcohol Swabs (ALCOHOL PADS) 70 % PADS 1 Application 2 times daily 100 each 3     blood glucose (NO BRAND SPECIFIED) lancets standard Use to test blood sugar 2 times daily or as directed. 100 each 3     blood glucose (NO BRAND SPECIFIED) test strip Check blood sugar twice a day 100 strip 3     blood glucose monitoring (NO BRAND SPECIFIED) meter device kit Use to test blood sugar 2 times daily or as directed. 1 kit 0     enzalutamide (XTANDI) 40 MG capsule        Leuprolide Acetate, 4 Month, (LUPRON DEPOT, 4-MONTH, IM)        ondansetron (ZOFRAN-ODT) 8 MG ODT tab        tamsulosin (FLOMAX) 0.4 MG capsule Take 2 capsules (0.8 mg) by mouth At Bedtime - Fasting physical due for further refills 180 capsule 1       Allergies   Allergen Reactions     No Known Allergies         Social History     Tobacco Use     Smoking status: Former Smoker     Quit date: 1970     Years since quittin.8     Smokeless tobacco: Never Used   Substance Use Topics     Alcohol use: Yes     Alcohol/week: 0.0 - 1.0 standard drinks     Comment: rare     Family History   Problem Relation Age of Onset     Coronary Artery Disease Father      Breast Cancer Sister      Breast Cancer Mother      History   Drug Use No         Objective     /71 (BP Location: Right arm, Patient Position: Sitting, Cuff Size: Adult Large)   Pulse 117   Temp 96.9  F (36.1  C) (Temporal)   Resp 20   Wt 115.4 kg (254 lb 8 oz)   SpO2 96%   BMI 37.58 kg/m      Physical Exam    GENERAL APPEARANCE: alert and no distress     EYES: EOMI,  PERRL     HENT: ear canals and TM's normal and nose and mouth without ulcers or  lesions     NECK: no adenopathy, no asymmetry, masses, or scars and thyroid normal to palpation     RESP: lungs clear to auscultation - no rales, rhonchi or wheezes     CV: regular rates and rhythm     ABDOMEN:  soft, nontender, no HSM or masses and bowel sounds normal     MS: extremities normal- no gross deformities noted     SKIN: no suspicious lesions or rashes     NEURO: Normal strength and tone, sensory exam grossly normal, mentation intact and speech normal     PSYCH: mentation appears normal. and affect normal/bright     LYMPHATICS: No cervical adenopathy    Recent Labs   Lab Test 06/17/21  0758   HGB 13.0*      CR 1.46*   A1C 7.4*        Diagnostics:  Recent Results (from the past 24 hour(s))   Hemoglobin    Collection Time: 11/08/21 11:10 AM   Result Value Ref Range    Hemoglobin 13.6 13.3 - 17.7 g/dL             Signed Electronically by: Martha Cr MD  Copy of this evaluation report is provided to requesting physician.

## 2021-11-09 ENCOUNTER — TRANSFERRED RECORDS (OUTPATIENT)
Dept: HEALTH INFORMATION MANAGEMENT | Facility: CLINIC | Age: 81
End: 2021-11-09
Payer: MEDICARE

## 2021-11-12 ENCOUNTER — ALLIED HEALTH/NURSE VISIT (OUTPATIENT)
Dept: FAMILY MEDICINE | Facility: CLINIC | Age: 81
End: 2021-11-12
Payer: MEDICARE

## 2021-11-12 DIAGNOSIS — Z01.810 PRE-OPERATIVE CARDIOVASCULAR EXAMINATION: ICD-10-CM

## 2021-11-12 DIAGNOSIS — R33.8 BENIGN PROSTATIC HYPERPLASIA WITH URINARY RETENTION: ICD-10-CM

## 2021-11-12 DIAGNOSIS — Z11.59 ENCOUNTER FOR SCREENING FOR OTHER VIRAL DISEASES: ICD-10-CM

## 2021-11-12 DIAGNOSIS — N40.1 BENIGN PROSTATIC HYPERPLASIA WITH URINARY RETENTION: ICD-10-CM

## 2021-11-12 LAB — SARS-COV-2 RNA RESP QL NAA+PROBE: NEGATIVE

## 2021-11-12 PROCEDURE — U0003 INFECTIOUS AGENT DETECTION BY NUCLEIC ACID (DNA OR RNA); SEVERE ACUTE RESPIRATORY SYNDROME CORONAVIRUS 2 (SARS-COV-2) (CORONAVIRUS DISEASE [COVID-19]), AMPLIFIED PROBE TECHNIQUE, MAKING USE OF HIGH THROUGHPUT TECHNOLOGIES AS DESCRIBED BY CMS-2020-01-R: HCPCS

## 2021-11-12 PROCEDURE — U0005 INFEC AGEN DETEC AMPLI PROBE: HCPCS

## 2021-11-12 PROCEDURE — 99207 PR NO CHARGE NURSE ONLY: CPT

## 2021-11-12 NOTE — LETTER
November 15, 2021      Ross Caballero  [address] [facility] [address]        Dear ,    We are writing to inform you of your test results.    Your COVID test was negative.    Resulted Orders   Asymptomatic COVID-19 Virus (Coronavirus) by PCR Nose   Result Value Ref Range    SARS CoV2 PCR Negative Negative, Testing sent to reference lab. Results will be returned via unsolicited result      Comment:      NEGATIVE: SARS-CoV-2 (COVID-19) RNA not detected, presumed negative.    Narrative    Testing was performed using the [employer] SARS-CoV-2 Assay on the  [employer] Instrument System. Additional information about this  Emergency Use Authorization (EUA) assay can be found via the Lab  Guide. This test should be ordered for the detection of SARS-CoV-2 in  individuals who meet SARS-CoV-2 clinical and/or epidemiological  criteria. Test performance is unknown in asymptomatic patients. This  test is for in vitro diagnostic use under the FDA EUA for  laboratories certified under CLIA to perform high complexity testing.  This test has not been FDA cleared or approved. A negative result  does not rule out the presence of PCR inhibitors in the specimen or  target RNA in concentration below the limit of detection for the  assay. The possibility of a false negative should be considered if  the patient's recent exposure or clinical presentation suggests  COVID-19. This test was validated by the [facility]. This laboratory is certified under  the Clinical Laboratory Improvement Amendments of 1988 (CLIA-88) as  qualified to perform high complexity laboratory testing.       If you have any questions or concerns, please call the clinic at the number listed above.       Sincerely,      Martha Cr MD        jacqui

## 2021-11-15 ENCOUNTER — ANESTHESIA EVENT (OUTPATIENT)
Dept: SURGERY | Facility: CLINIC | Age: 81
End: 2021-11-15
Payer: MEDICARE

## 2021-11-16 ENCOUNTER — ANESTHESIA (OUTPATIENT)
Dept: SURGERY | Facility: CLINIC | Age: 81
End: 2021-11-16
Payer: MEDICARE

## 2021-11-16 ENCOUNTER — HOSPITAL ENCOUNTER (OUTPATIENT)
Facility: CLINIC | Age: 81
Discharge: HOME | End: 2021-11-16
Attending: UROLOGY | Admitting: UROLOGY
Payer: MEDICARE

## 2021-11-16 VITALS
TEMPERATURE: 98.2 F | HEIGHT: 69 IN | OXYGEN SATURATION: 96 % | HEART RATE: 85 BPM | RESPIRATION RATE: 16 BRPM | WEIGHT: 251.4 LBS | BODY MASS INDEX: 37.23 KG/M2 | DIASTOLIC BLOOD PRESSURE: 64 MMHG | SYSTOLIC BLOOD PRESSURE: 114 MMHG

## 2021-11-16 DIAGNOSIS — C61 PROSTATE CANCER (H): Primary | ICD-10-CM

## 2021-11-16 LAB
CREAT SERPL-MCNC: 1.37 MG/DL (ref 0.66–1.25)
FASTING STATUS PATIENT QL REPORTED: YES
GFR SERPL CREATININE-BSD FRML MDRD: 48 ML/MIN/1.73M2
GLUCOSE BLD-MCNC: 137 MG/DL (ref 70–99)
POTASSIUM BLD-SCNC: 4.1 MMOL/L (ref 3.4–5.3)

## 2021-11-16 PROCEDURE — 710N000010 HC RECOVERY PHASE 1, LEVEL 2, PER MIN: Performed by: UROLOGY

## 2021-11-16 PROCEDURE — 250N000025 HC SEVOFLURANE, PER MIN: Performed by: UROLOGY

## 2021-11-16 PROCEDURE — C1769 GUIDE WIRE: HCPCS | Performed by: UROLOGY

## 2021-11-16 PROCEDURE — 250N000011 HC RX IP 250 OP 636: Performed by: PHYSICIAN ASSISTANT

## 2021-11-16 PROCEDURE — 258N000003 HC RX IP 258 OP 636: Performed by: NURSE ANESTHETIST, CERTIFIED REGISTERED

## 2021-11-16 PROCEDURE — 272N000001 HC OR GENERAL SUPPLY STERILE: Performed by: UROLOGY

## 2021-11-16 PROCEDURE — 36415 COLL VENOUS BLD VENIPUNCTURE: CPT | Performed by: ANESTHESIOLOGY

## 2021-11-16 PROCEDURE — 82565 ASSAY OF CREATININE: CPT | Performed by: ANESTHESIOLOGY

## 2021-11-16 PROCEDURE — 360N000077 HC SURGERY LEVEL 4, PER MIN: Performed by: UROLOGY

## 2021-11-16 PROCEDURE — 250N000009 HC RX 250: Performed by: NURSE ANESTHETIST, CERTIFIED REGISTERED

## 2021-11-16 PROCEDURE — 370N000017 HC ANESTHESIA TECHNICAL FEE, PER MIN: Performed by: UROLOGY

## 2021-11-16 PROCEDURE — 84132 ASSAY OF SERUM POTASSIUM: CPT | Performed by: ANESTHESIOLOGY

## 2021-11-16 PROCEDURE — 258N000003 HC RX IP 258 OP 636: Performed by: PHYSICIAN ASSISTANT

## 2021-11-16 PROCEDURE — 250N000011 HC RX IP 250 OP 636: Performed by: NURSE ANESTHETIST, CERTIFIED REGISTERED

## 2021-11-16 PROCEDURE — 360N000079 HC SURGERY LEVEL 6, PER MIN: Performed by: UROLOGY

## 2021-11-16 PROCEDURE — 710N000012 HC RECOVERY PHASE 2, PER MINUTE: Performed by: UROLOGY

## 2021-11-16 PROCEDURE — 999N000141 HC STATISTIC PRE-PROCEDURE NURSING ASSESSMENT: Performed by: UROLOGY

## 2021-11-16 PROCEDURE — 82947 ASSAY GLUCOSE BLOOD QUANT: CPT | Performed by: ANESTHESIOLOGY

## 2021-11-16 PROCEDURE — 258N000003 HC RX IP 258 OP 636: Performed by: ANESTHESIOLOGY

## 2021-11-16 PROCEDURE — 250N000013 HC RX MED GY IP 250 OP 250 PS 637: Performed by: PHYSICIAN ASSISTANT

## 2021-11-16 PROCEDURE — 250N000009 HC RX 250: Performed by: UROLOGY

## 2021-11-16 RX ORDER — FENTANYL CITRATE 0.05 MG/ML
25 INJECTION, SOLUTION INTRAMUSCULAR; INTRAVENOUS
Status: CANCELLED | OUTPATIENT
Start: 2021-11-16

## 2021-11-16 RX ORDER — ONDANSETRON 4 MG/1
4 TABLET, ORALLY DISINTEGRATING ORAL EVERY 30 MIN PRN
Status: CANCELLED | OUTPATIENT
Start: 2021-11-16

## 2021-11-16 RX ORDER — HYDROMORPHONE HCL IN WATER/PF 6 MG/30 ML
0.4 PATIENT CONTROLLED ANALGESIA SYRINGE INTRAVENOUS EVERY 5 MIN PRN
Status: CANCELLED | OUTPATIENT
Start: 2021-11-16

## 2021-11-16 RX ORDER — OXYCODONE HYDROCHLORIDE 5 MG/1
5 TABLET ORAL EVERY 4 HOURS PRN
Status: CANCELLED | OUTPATIENT
Start: 2021-11-16

## 2021-11-16 RX ORDER — ONDANSETRON 2 MG/ML
4 INJECTION INTRAMUSCULAR; INTRAVENOUS EVERY 30 MIN PRN
Status: CANCELLED | OUTPATIENT
Start: 2021-11-16

## 2021-11-16 RX ORDER — LIDOCAINE HYDROCHLORIDE 20 MG/ML
INJECTION, SOLUTION INFILTRATION; PERINEURAL PRN
Status: DISCONTINUED | OUTPATIENT
Start: 2021-11-16 | End: 2021-11-16

## 2021-11-16 RX ORDER — LABETALOL HYDROCHLORIDE 5 MG/ML
5 INJECTION, SOLUTION INTRAVENOUS
Status: CANCELLED | OUTPATIENT
Start: 2021-11-16

## 2021-11-16 RX ORDER — ATROPA BELLADONNA AND OPIUM 16.2; 3 MG/1; MG/1
SUPPOSITORY RECTAL PRN
Status: DISCONTINUED | OUTPATIENT
Start: 2021-11-16 | End: 2021-11-16 | Stop reason: HOSPADM

## 2021-11-16 RX ORDER — TRAMADOL HYDROCHLORIDE 50 MG/1
50 TABLET ORAL EVERY 6 HOURS PRN
Qty: 10 TABLET | Refills: 0 | Status: SHIPPED | OUTPATIENT
Start: 2021-11-16 | End: 2021-11-19

## 2021-11-16 RX ORDER — FUROSEMIDE 10 MG/ML
INJECTION INTRAMUSCULAR; INTRAVENOUS PRN
Status: DISCONTINUED | OUTPATIENT
Start: 2021-11-16 | End: 2021-11-16

## 2021-11-16 RX ORDER — FENTANYL CITRATE 0.05 MG/ML
25 INJECTION, SOLUTION INTRAMUSCULAR; INTRAVENOUS EVERY 5 MIN PRN
Status: CANCELLED | OUTPATIENT
Start: 2021-11-16

## 2021-11-16 RX ORDER — ONDANSETRON 2 MG/ML
INJECTION INTRAMUSCULAR; INTRAVENOUS PRN
Status: DISCONTINUED | OUTPATIENT
Start: 2021-11-16 | End: 2021-11-16

## 2021-11-16 RX ORDER — FENTANYL CITRATE 50 UG/ML
INJECTION, SOLUTION INTRAMUSCULAR; INTRAVENOUS PRN
Status: DISCONTINUED | OUTPATIENT
Start: 2021-11-16 | End: 2021-11-16

## 2021-11-16 RX ORDER — SODIUM CHLORIDE, SODIUM LACTATE, POTASSIUM CHLORIDE, CALCIUM CHLORIDE 600; 310; 30; 20 MG/100ML; MG/100ML; MG/100ML; MG/100ML
INJECTION, SOLUTION INTRAVENOUS CONTINUOUS
Status: CANCELLED | OUTPATIENT
Start: 2021-11-16

## 2021-11-16 RX ORDER — SODIUM CHLORIDE, SODIUM LACTATE, POTASSIUM CHLORIDE, CALCIUM CHLORIDE 600; 310; 30; 20 MG/100ML; MG/100ML; MG/100ML; MG/100ML
INJECTION, SOLUTION INTRAVENOUS CONTINUOUS
Status: DISCONTINUED | OUTPATIENT
Start: 2021-11-16 | End: 2021-11-16 | Stop reason: HOSPADM

## 2021-11-16 RX ORDER — LIDOCAINE HYDROCHLORIDE 20 MG/ML
JELLY TOPICAL PRN
Status: DISCONTINUED | OUTPATIENT
Start: 2021-11-16 | End: 2021-11-16 | Stop reason: HOSPADM

## 2021-11-16 RX ORDER — PROPOFOL 10 MG/ML
INJECTION, EMULSION INTRAVENOUS PRN
Status: DISCONTINUED | OUTPATIENT
Start: 2021-11-16 | End: 2021-11-16

## 2021-11-16 RX ORDER — ACETAMINOPHEN 325 MG/1
975 TABLET ORAL ONCE
Status: COMPLETED | OUTPATIENT
Start: 2021-11-16 | End: 2021-11-16

## 2021-11-16 RX ORDER — EPHEDRINE SULFATE 50 MG/ML
INJECTION, SOLUTION INTRAMUSCULAR; INTRAVENOUS; SUBCUTANEOUS PRN
Status: DISCONTINUED | OUTPATIENT
Start: 2021-11-16 | End: 2021-11-16

## 2021-11-16 RX ADMIN — PHENYLEPHRINE HYDROCHLORIDE 0.4 MCG/KG/MIN: 10 INJECTION INTRAVENOUS at 09:38

## 2021-11-16 RX ADMIN — PHENYLEPHRINE HYDROCHLORIDE 100 MCG: 10 INJECTION INTRAVENOUS at 09:22

## 2021-11-16 RX ADMIN — PHENYLEPHRINE HYDROCHLORIDE 100 MCG: 10 INJECTION INTRAVENOUS at 09:29

## 2021-11-16 RX ADMIN — PHENYLEPHRINE HYDROCHLORIDE 200 MCG: 10 INJECTION INTRAVENOUS at 09:17

## 2021-11-16 RX ADMIN — LIDOCAINE HYDROCHLORIDE 60 MG: 20 INJECTION, SOLUTION INFILTRATION; PERINEURAL at 09:15

## 2021-11-16 RX ADMIN — FENTANYL CITRATE 25 MCG: 50 INJECTION, SOLUTION INTRAMUSCULAR; INTRAVENOUS at 09:55

## 2021-11-16 RX ADMIN — Medication 5 MG: at 09:52

## 2021-11-16 RX ADMIN — PHENYLEPHRINE HYDROCHLORIDE 100 MCG: 10 INJECTION INTRAVENOUS at 09:20

## 2021-11-16 RX ADMIN — ONDANSETRON 4 MG: 2 INJECTION INTRAMUSCULAR; INTRAVENOUS at 09:41

## 2021-11-16 RX ADMIN — GENTAMICIN SULFATE 440 MG: 40 INJECTION, SOLUTION INTRAMUSCULAR; INTRAVENOUS at 07:35

## 2021-11-16 RX ADMIN — PROPOFOL 200 MG: 10 INJECTION, EMULSION INTRAVENOUS at 09:15

## 2021-11-16 RX ADMIN — SODIUM CHLORIDE, POTASSIUM CHLORIDE, SODIUM LACTATE AND CALCIUM CHLORIDE: 600; 310; 30; 20 INJECTION, SOLUTION INTRAVENOUS at 07:34

## 2021-11-16 RX ADMIN — PHENYLEPHRINE HYDROCHLORIDE 100 MCG: 10 INJECTION INTRAVENOUS at 10:08

## 2021-11-16 RX ADMIN — FENTANYL CITRATE 50 MCG: 50 INJECTION, SOLUTION INTRAMUSCULAR; INTRAVENOUS at 09:16

## 2021-11-16 RX ADMIN — PHENYLEPHRINE HYDROCHLORIDE 150 MCG: 10 INJECTION INTRAVENOUS at 09:34

## 2021-11-16 RX ADMIN — Medication 5 MG: at 10:08

## 2021-11-16 RX ADMIN — FUROSEMIDE 10 MG: 10 INJECTION, SOLUTION INTRAVENOUS at 10:27

## 2021-11-16 RX ADMIN — Medication 5 MG: at 09:29

## 2021-11-16 RX ADMIN — FENTANYL CITRATE 25 MCG: 50 INJECTION, SOLUTION INTRAMUSCULAR; INTRAVENOUS at 10:00

## 2021-11-16 RX ADMIN — Medication 5 MG: at 09:35

## 2021-11-16 RX ADMIN — ACETAMINOPHEN 975 MG: 325 TABLET, FILM COATED ORAL at 07:30

## 2021-11-16 ASSESSMENT — ENCOUNTER SYMPTOMS
SEIZURES: 0
DYSRHYTHMIAS: 0

## 2021-11-16 ASSESSMENT — LIFESTYLE VARIABLES: TOBACCO_USE: 0

## 2021-11-16 ASSESSMENT — COPD QUESTIONNAIRES: COPD: 0

## 2021-11-16 ASSESSMENT — MIFFLIN-ST. JEOR: SCORE: 1835.72

## 2021-11-16 NOTE — BRIEF OP NOTE
Urology brief op NOTE  PREOP -MET PROSTATE CA, SOLITARY RT KIDNEY, RT HYDRO, HEMATURIA.   POSTOP DX SAME  PROCEDURE CYSTO ATTEMPTED RT RETROGRADE, GREEN LIGHT LASER TURP  ANES- GEN  SURGEON - OTILIO  EBL <5CC  FRINDINGS- FRIABLE PROSTATE.UNABLE TO FIND RT ORIFICE.   PLAN WILL DISCHARGE WITH [facility], RTC IN AM FOR REMOVAL. WILL EVENTUALLY NEED RT PERC TUBE ANTEGRADE STENT.

## 2021-11-16 NOTE — DISCHARGE INSTRUCTIONS
Same Day Surgery Discharge Instructions for  Sedation and General Anesthesia       It's not unusual to feel dizzy, light-headed or faint for up to 24 hours after surgery or while taking pain medication.  If you have these symptoms: sit for a few minutes before standing and have someone assist you when you get up to walk or use the bathroom.      You should rest and relax for the next 24 hours. We recommend you make arrangements to have an adult stay with you for at least 24 hours after your discharge.  Avoid hazardous and strenuous activity.      DO NOT DRIVE any vehicle or operate mechanical equipment for 24 hours following the end of your surgery.  Even though you may feel normal, your reactions may be affected by the medication you have received.      Do not drink alcoholic beverages for 24 hours following surgery.       Slowly progress to your regular diet as you feel able. It's not unusual to feel nauseated and/or vomit after receiving anesthesia.  If you develop these symptoms, drink clear liquids (apple juice, ginger ale, broth, 7-up, etc. ) until you feel better.  If your nausea and vomiting persists for 24 hours, please notify your surgeon.        All narcotic pain medications, along with inactivity and anesthesia, can cause constipation. Drinking plenty of liquids and increasing fiber intake will help.      For any questions of a medical nature, call your surgeon.      Do not make important decisions for 24 hours.      If you had general anesthesia, you may have a sore throat for a couple of days related to the breathing tube used during surgery.  You may use Cepacol lozenges to help with this discomfort.  If it worsens or if you develop a fever, contact your surgeon.       If you feel your pain is not well managed with the pain medications prescribed by your surgeon, please contact your surgeon's office to let them know so they can address your concerns.       CoVid 19 Information    We want to give you  information regarding Covid. Please consult your primary care provider with any questions you might have.     Patient who have symptoms (cough, fever, or shortness of breath), need to isolate for 7 days from when symptoms started OR 72 hours after fever resolves (without fever reducing medications) AND improvement of respiratory symptoms (whichever is longer).      Isolate yourself at home (in own room/own bathroom if possible)    Do Not allow any visitors    Do Not go to work or school    Do Not go to [employer],  centers, shopping, or other public places.    Do Not shake hands.    Avoid close and intimate contact with others (hugging, kissing).    Follow CDC recommendations for household cleaning of frequently touched services.     After the initial 7 days, continue to isolate yourself from household members as much as possible. To continue decrease the risk of community spread and exposure, you and any members of your household should limit activities in public for 14 days after starting home isolation.     You can reference the following CDC link for helpful home isolation/care tips:  https://www.cdc.gov/coronavirus/2019-ncov/downloads/10Things.pdf    Protect Others:    Cover Your Mouth and Nose with a mask, disposable tissue or wash cloth to avoid spreading germs to others.    Wash your hands and face frequently with soap and water    Call Your Primary Doctor If: Breathing difficulty develops or you become worse.    For more information about COVID19 and options for caring for yourself at home, please visit the CDC website at https://www.cdc.gov/coronavirus/2019-ncov/about/steps-when-sick.html  For more options for care at [facility], please visit our website at [email or web address]        Cystoscopy Discharge Instructions      Diet:    Return to the diet that you were on before the procedure, unless you are given specific diet instructions.    It is important to drink  6-8 glasses of fluids per day at home - at least 3-4 glasses should be water.    Activity:    Walk short distances and increase as your strength allows.    You may climb stairs.    Do not do strenuous exercise or heavy lifting until approved by surgeon.    Do not drive while taking narcotic pain medications.    Bathing:    You may take a shower.    Call your physician if these signs/symptoms are present:    Pain that is not relieved by a short rest or ordered pain medications.    Temperature at or above 101.0 F or chills.    Inability or difficulty urinating.  Excessive blood in urine.    Any questions or concerns.    DISCHARGE INSTRUCTIONS FOR   CATHETER CARE AT HOME      .      Basic Catheter Care  1. Always wash hands before and after handling your catheter.  2. Use soap and water to wash the area around your catheter.  3. Do this procedure twice a day.  4. Proper cleansing will help keep the area from becoming irritated or infected.    Leg Bag  This is a small plastic bag that collects urine draining from your catheter and then strapped around your thigh. It will need to be emptied when the bag is 1/2 to 3/4 full.     Large Drainage Bag  1. This bag is larger than the leg bag and holds more urine.  It is to be used while at home, especially at night.    2. Before you go to bed, change the leg bag to the large drainage bag.  3. Pinch off the catheter with your fingers and swab the connection between the catheter and leg bag with alcohol sponge.  4. Disconnect the leg bag and connect the large drainage bag to your catheter.  5. When you get into bed, arrange the drainage tubing so that it doesn t kink.  6. Be sure to keep the bag below the level of your bladder and allow enough slack for turning.    Cleaning Your Drainage Bags    1. Wash hands.  2. Using funnel or syringe, fill the bag half full with a solution of 1/2  vinegar and 1/2 water.  3. Shake bag, allowing mixture to cleanse inside of bag.  4. Empty out  all vinegar and water mixture from your bag.  5. Hang bag to dry when not in use.  6. Clean your bags anytime you change them.      Helpful Hints  1. Always keep drainage bags below bladder level to insure adequate drainage.  2. Drink 4-6 glasses of water daily along with other fluids you normally drink to keep urine free of infection and / or clots.  3. If you notice no urine in your bag for 2 to 4 hours or you develop extreme discomfort in bladder area, your catheter maybe plugged.  Notify your doctor.  4. If you notice your urine becomes foul smelling and cloudy, notify your doctor.  Also notify your doctor if you develop fever or chills.  5. If you notice urine leaking around the outside of the catheter, check to be sure catheter or tubing is not kinked.  6. Don t use leg bag while in bed.

## 2021-11-16 NOTE — OP NOTE
Procedure Date: 11/16/2021    PREOPERATIVE DIAGNOSES:  1.  Metastatic prostate cancer.  2.  Gross hematuria.  3.  Right hydronephrosis.  4.  Solitary right kidney.    POSTOPERATIVE DIAGNOSES:    1.  Metastatic prostate cancer.  2.  Gross hematuria.  3.  Right hydronephrosis.  4.  Solitary right kidney.   5.  Unable to locate right ureteral orifice.  6.  Friable prostatic tissue.    PROCEDURES:    1.  Cystoscopy, attempted right retrograde.  2.  GreenLight laser TURP.    ANESTHESIA:  General.    SURGEON:  Gurdeep Hickman MD    ESTIMATED BLOOD LOSS:  Less than 5 mL    SUMMARY OF FINDINGS:    1.  Friable prostatic fossa.  2.  Unable to locate right orifice bladder diverticula.    BRIEF HISTORY AND PHYSICAL:  The patient is an 81-year-old male, previously followed by Dr. Doran.  The patient is status post TURP in 2016.  He is status post cryotherapy (Dr. lunsford ) in 2018, history of recurrent metastatic prostate cancer with increased pelvic lymph nodes followed by  , presently on Xtandi.  The patient's creatinine was 1.37, although his most recent CT scan demonstrated evidence of progressive right hydronephrosis with significant distal ureteral narrowing and obstruction.  He has been relatively asymptomatic.  He has had recurrent episodes of hematuria with the passage of several clots.  With these findings, he is now scheduled for further evaluation.  Procedure was thoroughly explained to the patient, including possible complications and realistic expectations.  All questions were answered to his satisfaction.    DESCRIPTION OF PROCEDURE:  Proper permits were obtained and signed.  The patient was taken to the operating suite.  After adequate general anesthesia, he was prepped in usual sterile fashion.  A 22-Serbian Storz was visually passed through urethra, which was normal.  External sphincter was intact.  He did have evidence of previous TURP with some scarring of the prostate with significant fragile prostatic  tissue extending into the bladder.  The bladder was entered.  There was evidence of 2 relatively large bladder diverticula on both the right and left sides of the bladder.  Numerous attempts were made with both the 30-degree and 70-degree lenses in order to locate the right ureteral orifice, but this was not possible.  The 22 scope then was removed.  A 26-Polish continuous flow resectoscope with the laser bridge was then gently passed into the bladder with the Blake apparatus.  The patient underwent laser vaporization of the significant tissue at the bladder neck, which was quite obstructing.  Vaporization was continued in a conservative manner along the right from the bladder neck towards approximately a centimeter from the verumontanum.  It was then continued on the left lobe in a similar fashion, and then completed on the floor.  There was minimal tissue on the roof.  This did open up the prostate nicely.  There was no evidence of bleeding noted.  Reattempt was made to locate the right orifice and again was not successful.  Xylocaine jelly was injected into the urethra.  A 20 three-way Oro catheter was placed and irrigated clear.  He was given 10 mg of Lasix and a B and O suppository and taken to recovery in stable condition.  He will most likely eventually require a right percutaneous nephrostomy tube and antegrade stent in the future.    Gurdeep Hickman MD        D: 2021   T: 2021   MT: md/JAIME    Name:     DENNIS BARNETT  MRN:      [id number]        Account:        [id number]   :      1940           Procedure Date: 2021     Document: [id number]    cc:  MD Gurdeep Dennis MD

## 2021-11-16 NOTE — ANESTHESIA PROCEDURE NOTES
Airway       Patient location during procedure: OR  Staff -        Performed By: CRNA  Consent for Airway        Urgency: elective  Indications and Patient Condition       Indications for airway management: live-procedural       Induction type:intravenous       Mask difficulty assessment: 2 - vent by mask + OA or adjuvant +/- NMBA    Final Airway Details       Final airway type: supraglottic airway    Supraglottic Airway Details        Type: LMA       Brand: Ambu AuraGain       LMA size: 5    Post intubation assessment        Placement verified by: capnometry and equal breath sounds        Number of attempts at approach: 1       Secured with: commercial tube escobedo and pink tape       Ease of procedure: easy       Dentition: Unchanged and Intact

## 2021-11-16 NOTE — ANESTHESIA CARE TRANSFER NOTE
Patient: Ross Caballero    Procedure: Procedure(s):  CYSTOSCOPY  GREEN LIGHT LASER TRANSURETHRAL RESECTION PROSTATE       Diagnosis: Prostate cancer (H) [C61]  Hydronephrosis with renal and ureteral calculus obstruction [N13.2]  Hematuria syndrome [R31.9]  Diagnosis Additional Information: No value filed.    Anesthesia Type:   General     Note:    Oropharynx: oropharynx clear of all foreign objects and spontaneously breathing  Level of Consciousness: awake  Oxygen Supplementation: face mask    Independent Airway: airway patency satisfactory and stable  Dentition: dentition unchanged  Vital Signs Stable: post-procedure vital signs reviewed and stable  Report to RN Given: handoff report given  Patient transferred to: PACU    Handoff Report: Identifed the Patient, Identified the Reponsible Provider, Reviewed the pertinent medical history, Discussed the surgical course, Reviewed Intra-OP anesthesia mangement and issues during anesthesia, Set expectations for post-procedure period and Allowed opportunity for questions and acknowledgement of understanding      Vitals:  Vitals Value Taken Time   /70 11/16/21 1029   Temp     Pulse 89 11/16/21 1031   Resp 19 11/16/21 1031   SpO2 98 % 11/16/21 1031   Vitals shown include unvalidated device data.    Electronically Signed By: JAIME Bowser CRNA  November 16, 2021  10:32 AM

## 2021-11-16 NOTE — ANESTHESIA PREPROCEDURE EVALUATION
Anesthesia Pre-Procedure Evaluation    Patient: Ross Caballero   MRN: [id number] : 1940        Preoperative Diagnosis: Prostate cancer (H) [C61]  Hydronephrosis with renal and ureteral calculus obstruction [N13.2]  Hematuria syndrome [R31.9]    Procedure : Procedure(s):  CYSTOSCOPY, RIGHT RETROGRADE PYELOGRAM, POSSIBLE RIGHT STENT PLACEMENT  GREEN LIGHT LASER TRANSURETHRAL RESECTION PROSTATE          Past Medical History:   Diagnosis Date     Benign essential hypertension      BPH (benign prostatic hyperplasia)     flomax      Calculus of kidney     with lithrotripsy      Diabetes mellitus (H)      Melanoma (H)      Obese      Osteoarthritis, knee     has had synvisc type shot      Osteomyelitis (H)     toe amputation     Prostate cancer (H) 10/18/2019     Pyelonephritis 2016      Past Surgical History:   Procedure Laterality Date     AMPUTATION of toe       COLONOSCOPY       CYSTOSCOPY FLEXIBLE, CYOABLATION PROSTATE N/A 2018    Procedure: CYSTOSCOPY FLEXIBLE, CRYOABLATION PROSTATE;  FLEXIBLE CYSTOSCOPY, CRYOABLATION OF THE PROSTATE ;  Surgeon: Nima Calvert MD;  Location: [facility] OR     GENITOURINARY SURGERY Left     Nephrectomy     HERNIA REPAIR      Umbilical hernia      melanoma removal        Allergies   Allergen Reactions     No Known Allergies       Social History     Tobacco Use     Smoking status: Former Smoker     Quit date: 1970     Years since quittin.9     Smokeless tobacco: Never Used   Substance Use Topics     Alcohol use: Yes     Alcohol/week: 0.0 - 1.0 standard drinks     Comment: rare      Wt Readings from Last 1 Encounters:   21 114 kg (251 lb 6.4 oz)        Anesthesia Evaluation            ROS/MED HX  ENT/Pulmonary:    (-) tobacco use, asthma, COPD and sleep apnea   Neurologic:    (-) no seizures and no CVA   Cardiovascular:     (+) hypertension----- (-) CAD, CHF and arrhythmias   METS/Exercise Tolerance: 4 - Raking leaves, gardening    Hematologic:        Musculoskeletal:   (+) arthritis,     GI/Hepatic:    (-) GERD and liver disease   Renal/Genitourinary: Comment: Polycystic kidney disease    (+) BPH,  (-) renal disease   Endo:     (+) type II DM, Obesity,  (-) Type I DM   Psychiatric/Substance Use:       Infectious Disease:       Malignancy:   (+) Malignancy, History of Prostate.    Other:            Physical Exam    Airway        Mallampati: III   TM distance: > 3 FB   Neck ROM: full   Mouth opening: > 3 cm    Respiratory Devices and Support         Dental  no notable dental history         Cardiovascular          Rhythm and rate: regular     Pulmonary           breath sounds clear to auscultation           OUTSIDE LABS:  CBC:   Lab Results   Component Value Date    WBC 8.4 06/17/2021    WBC 6.5 10/18/2019    HGB 13.6 11/08/2021    HGB 13.0 (L) 06/17/2021    HCT 38.3 (L) 06/17/2021    HCT 41.7 10/18/2019     06/17/2021     10/18/2019     BMP:   Lab Results   Component Value Date     10/18/2019     09/14/2018    POTASSIUM 5.1 10/18/2019    POTASSIUM 4.8 09/14/2018    CHLORIDE 104 10/18/2019    CHLORIDE 106 09/14/2018    CO2 27 10/18/2019    CO2 25 09/14/2018    BUN 39 (H) 10/18/2019    BUN 36 (H) 09/14/2018    CR 1.46 (H) 06/17/2021    CR 1.60 (H) 10/18/2019     (H) 10/18/2019     (H) 09/14/2018     COAGS:   Lab Results   Component Value Date    INR 1.1 05/10/2018     POC:   Lab Results   Component Value Date     (H) 09/27/2018     HEPATIC:   Lab Results   Component Value Date    ALBUMIN 3.7 10/18/2019    PROTTOTAL 7.2 10/18/2019    ALT 24 10/18/2019    AST 14 10/18/2019    ALKPHOS 55 10/18/2019    BILITOTAL 0.4 10/18/2019     OTHER:   Lab Results   Component Value Date    A1C 7.4 (H) 06/17/2021    RADHA 9.4 10/18/2019    TSH 0.87 07/20/2016       Anesthesia Plan    ASA Status:  3      Anesthesia Type: General.     - Airway: LMA   Induction: Intravenous, Propofol.   Maintenance: Balanced.        Consents    Anesthesia  Plan(s) and associated risks, benefits, and realistic alternatives discussed. Questions answered and patient/representative(s) expressed understanding.     - Discussed with:  Patient         Postoperative Care    Pain management: Multi-modal analgesia.   PONV prophylaxis: Ondansetron (or other 5HT-3)     Comments:                Magdiel Liang MD

## 2021-11-16 NOTE — ANESTHESIA POSTPROCEDURE EVALUATION
Patient: Ross Caballero    Procedure: Procedure(s):  CYSTOSCOPY  GREEN LIGHT LASER TRANSURETHRAL RESECTION PROSTATE       Diagnosis:Prostate cancer (H) [C61]  Hydronephrosis with renal and ureteral calculus obstruction [N13.2]  Hematuria syndrome [R31.9]  Diagnosis Additional Information: No value filed.    Anesthesia Type:  General    Note:     Postop Pain Control: Uneventful            Sign Out: Well controlled pain   PONV: No   Neuro/Psych: Uneventful            Sign Out: Acceptable/Baseline neuro status   Airway/Respiratory: Uneventful            Sign Out: Acceptable/Baseline resp. status   CV/Hemodynamics: Uneventful            Sign Out: Acceptable CV status; No obvious hypovolemia; No obvious fluid overload   Other NRE: NONE   DID A NON-ROUTINE EVENT OCCUR? No           Last vitals:  Vitals Value Taken Time   /60 11/16/21 1130   Temp 36.5  C (97.7  F) 11/16/21 1030   Pulse 80 11/16/21 1132   Resp 18 11/16/21 1132   SpO2 97 % 11/16/21 1132   Vitals shown include unvalidated device data.    Electronically Signed By: Magdiel Liang MD  November 16, 2021  3:59 PM

## 2022-01-08 ENCOUNTER — TRANSFERRED RECORDS (OUTPATIENT)
Dept: HEALTH INFORMATION MANAGEMENT | Facility: CLINIC | Age: 82
End: 2022-01-08
Payer: MEDICARE

## 2022-01-23 ENCOUNTER — HEALTH MAINTENANCE LETTER (OUTPATIENT)
Age: 82
End: 2022-01-23

## 2022-02-02 ENCOUNTER — TRANSFERRED RECORDS (OUTPATIENT)
Dept: HEALTH INFORMATION MANAGEMENT | Facility: CLINIC | Age: 82
End: 2022-02-02
Payer: MEDICARE

## 2022-03-30 ENCOUNTER — TRANSFERRED RECORDS (OUTPATIENT)
Dept: HEALTH INFORMATION MANAGEMENT | Facility: CLINIC | Age: 82
End: 2022-03-30
Payer: MEDICARE

## 2022-07-26 ENCOUNTER — TRANSFERRED RECORDS (OUTPATIENT)
Dept: HEALTH INFORMATION MANAGEMENT | Facility: CLINIC | Age: 82
End: 2022-07-26

## 2022-08-09 ENCOUNTER — TRANSFERRED RECORDS (OUTPATIENT)
Dept: FAMILY MEDICINE | Facility: CLINIC | Age: 82
End: 2022-08-09

## 2022-08-15 ENCOUNTER — HOSPITAL ENCOUNTER (EMERGENCY)
Facility: CLINIC | Age: 82
Discharge: HOME | End: 2022-08-15
Attending: PHYSICIAN ASSISTANT | Admitting: PHYSICIAN ASSISTANT
Payer: MEDICARE

## 2022-08-15 ENCOUNTER — APPOINTMENT (OUTPATIENT)
Dept: GENERAL RADIOLOGY | Facility: CLINIC | Age: 82
End: 2022-08-15
Attending: EMERGENCY MEDICINE
Payer: MEDICARE

## 2022-08-15 ENCOUNTER — APPOINTMENT (OUTPATIENT)
Dept: CT IMAGING | Facility: CLINIC | Age: 82
End: 2022-08-15
Attending: EMERGENCY MEDICINE
Payer: MEDICARE

## 2022-08-15 VITALS
DIASTOLIC BLOOD PRESSURE: 73 MMHG | OXYGEN SATURATION: 97 % | RESPIRATION RATE: 16 BRPM | HEART RATE: 66 BPM | TEMPERATURE: 98.2 F | SYSTOLIC BLOOD PRESSURE: 137 MMHG

## 2022-08-15 DIAGNOSIS — S09.90XA CLOSED HEAD INJURY, INITIAL ENCOUNTER: ICD-10-CM
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
## 2022-08-15 PROCEDURE — 250N000013 HC RX MED GY IP 250 OP 250 PS 637: Performed by: PHYSICIAN ASSISTANT

## 2022-08-15 PROCEDURE — G1010 CDSM STANSON: HCPCS

## 2022-08-15 PROCEDURE — 71101 X-RAY EXAM UNILAT RIBS/CHEST: CPT | Mod: LT

## 2022-08-15 PROCEDURE — 99284 EMERGENCY DEPT VISIT MOD MDM: CPT | Mod: 25

## 2022-08-15 RX ORDER — ACETAMINOPHEN 500 MG
1000 TABLET ORAL ONCE
Status: COMPLETED | OUTPATIENT
Start: 2022-08-15 | End: 2022-08-15

## 2022-08-15 RX ADMIN — ACETAMINOPHEN 1000 MG: 500 TABLET, FILM COATED ORAL at 14:21

## 2022-08-15 ASSESSMENT — ENCOUNTER SYMPTOMS
HEADACHES: 0
NUMBNESS: 0
NECK PAIN: 0
WEAKNESS: 0
DIZZINESS: 0

## 2022-08-15 NOTE — ED PROVIDER NOTES
History   Chief Complaint:  Fall     HPI   Ross Caballero is a 82 year old male with history of type II diabetes, hypertension, prostate cancer currently being treated for with chemotherapy, who presents after a fall. The patient reports that he was carrying in groceries this morning with when he had a mechanical fall, tripping over carpet in his home. He landed on his left side hitting his rib cage and then hit his head as well. He did not lose consciousness.  He has a small scratch to his left cheek from his glasses.  Teeth come together normally.  His only pain is in his left lateral ribs. He was able to get up after. He reports that he took a 200 mg ibuprofen this morning before the fall. The patient denies dizziness, numbness, weakness, neck pain, hip pain or leg pain. No abdominal or back pain.  He has pain when he breaths in on the L side.    Review of Systems   Musculoskeletal: Negative for neck pain.        + left rib pain  - leg pain   Neurological: Negative for dizziness, syncope, weakness, numbness and headaches.   All other systems reviewed and are negative.    Allergies:  The patient has no known allergies.     Medications:  Deltasone  Xtandi   Flomax    Past Medical History:     Calculus of kidney  Polycystic kidney disease  Hypertension    CKD stage 3   Type II diabetes  Prostate cancer   Morbid obesity   UTI  VARUN  BPH  Hydronephrosis   Hyperkalemia    Diabetic ulcer of left heel   Osteomyelitis   OA    Past Surgical History:    Amputation of toe  Colonoscopy   Cystoscopy   Cystoscopy flexible, cryoablation prostate   Nephrectomy, left  Umbilical hernia   Green light laser transurethral resection prostate   Melanoma removal   Renal cyst drainage     Family History:    Father: CAD  Mother: breast cancer     Social History:  The patient presents to the ED alone  Living Situation: He lives with his wife  PCP: Martha Cr     Physical Exam     Patient Vitals for the past 24 hrs:   BP Temp Temp  src Pulse Resp SpO2   08/15/22 1216 131/77 98.2  F (36.8  C) Oral 82 16 98 %       Physical Exam  General: Alert and cooperative with exam.  Head:  Scalp is NC/AT without bruising, hematomas.   Eyes:  Globes normal and atraumatic.  PERRL, EOMI   ENT:  The external nose and ears are normal, no septal hematoma.    TM's normal bilaterally    Small abrasion to L cheek.  No bruising or facial bone tenderness.    Oropharynx without dental trauma or intraoral lacerations.  Neck:  Normal range of motion without rigidity. Able to rotate 45 degrees BL.  CV:  Regular rate and rhythm    No pathologic murmur, rubs, or gallops.  Resp:  Breath sounds are clear bilaterally.    Non-labored, no retractions or accessory muscle use  Abdomen: Abdomen is soft, no distension, no tenderness, no masses.  No flank tenderness.  MS:  No midline cervical, thoracic, or lumbar tenderness    Mild ttp to left anterior and lateral 7th 8th rib area.  No bruising or crepitance or step off.  Otherwise non-tender.  No tenderness over sternum, scapula,  clavicles.    PROM of all other major joints performed and unremarkable.  Skin:  Warm and dry, No bruising.  Neuro: Alert and oriented.  Strength and sensation grossly intact in all 4 extremities.  Cranial nerves  2-12 intact. GCS: 15. Gait normal.  Psych:  Awake. Alert. Normal affect. Appropriate interactions.      Emergency Department Course   Imaging:  Ribs XR, unilat 3 views + PA chest,  left   Final Result   IMPRESSION: Heart size and pulmonary vascularity normal. Both lungs   clear. No rib fractures.      DWAYNE DUTTON MD            SYSTEM ID:  [id number]      CT Head w/o Contrast   Final Result   IMPRESSION: No acute intracranial abnormality.       ROGERIO RODRIGUEZ MD            SYSTEM ID:  [id number]        Report per radiology      Emergency Department Course:  Reviewed:  I reviewed nursing notes, vitals, past medical history and Care Everywhere    Assessments:  1340 I obtained history and  examined the patient as noted above.   1410 I rechecked the patient and explained findings.     Interventions:  Tylenol, 1,000 mg, PO    Disposition:  The patient was discharged to home.     Impression & Plan   Medical Decision Makin-year-old male presents with left-sided rib pain and head injury after mechanical fall.  CT of the head is negative.  X-ray of the chest and ribs shows no evidence of rib fracture, pneumothorax, hemothorax or other acute abnormality.  Abdominal exam is benign and nontender no left upper quadrant tenderness or evidence of splenic injury.  Very low suspicion for more serious occult intrathoracic or abdominal injury based on mechanism and we have decided together against pursuing CT scan at this time.  No neck pain or neurologic symptoms, C-spine cleared clinically using Nexus criteria.  Head to toe trauma exam otherwise nonconcerning.  Discussed pain control with OTC analgesics.  Also discussed prescription for Lidoderm patches for patient's as his wife has them at home and he will use these.  Will prescribe incentive spirometer for home use and instructed to use to prevent atelectasis and pneumonia.  He will return if increasing pain, shortness of breath, cough, fever, abdominal pain, numbness or weakness, or other new or worsening symptoms.    Diagnosis:    ICD-10-CM    1. Fall, initial encounter  W19.XXXA    2. Closed head injury, initial encounter  S09.90XA        Discharge Medications:  New Prescriptions    No medications on file       Scribe Disclosure:  I, Benjamin Marrero, am serving as a scribe at 1:08 PM on 8/15/2022 to document services personally performed by Armando George PA-C based on my observations and the provider's statements to me.            Armando George PA-C  08/15/22 1413

## 2022-08-15 NOTE — ED NOTES
Rapid Assessment Note    History:   Ross Caballero is a 82 year old male who presents with mechanical fall this morning while carrying groceries and over the threshold stating he tripped and landed on the left chest wall.  He has significant pain of the left ribs with movement.  Minimal shortness of breath.  Patient did hit his head on the cement floor that is lightly covered with a thin carpet.  He denies loss of consciousness.  He denies neck or back pain.  There is no dizziness, palpitations, chest pain prior to the fall or after.  Patient uses ibuprofen but no other blood thinners for his history.  He has a slight abrasion to his left cheek but feels his dental occlusion is normal.    Exam:   General:  Alert, interactive  Cardiovascular:  Well perfused  Lungs:  No respiratory distress, no accessory muscle use clear to auscultation bilaterally.  Neuro:  Moving all 4 extremities  Skin:  Warm, dry  Psych:  Normal affect      Plan of Care:   I evaluated the patient and developed an initial plan of care. I discussed this plan and explained that I, or one of my partners, would be returning to complete the evaluation.  Will order chest x-ray and rib series as well as a head CT and plan for full evaluation by one of my partners when a room is available in the main ED.      08/15/2022  [facility] [facility]    Portions of this medical record were completed by a scribe. UPON MY REVIEW AND AUTHENTICATION BY ELECTRONIC SIGNATURE, this confirms (a) I performed the applicable clinical services, and (b) the record is accurate.        Alecia Bolaños MD  08/15/22 [id number]

## 2022-08-16 ENCOUNTER — PATIENT OUTREACH (OUTPATIENT)
Dept: CARE COORDINATION | Facility: CLINIC | Age: 82
End: 2022-08-16

## 2022-08-16 DIAGNOSIS — Z71.89 OTHER SPECIFIED COUNSELING: ICD-10-CM

## 2022-08-16 NOTE — PROGRESS NOTES
"W provided patient with [facility] scheduling number, [phone] ([phone]), to schedule an Annual Wellness Visit.    Clinic Care Coordination Contact  CHAVO Buaer: Post-Discharge Note  SITUATION                                                      Admission:    Admission Date: 08/15/22   Reason for Admission: Fall  Discharge:   Discharge Date: 08/15/22  Discharge Diagnosis: Fall, initial encounter, Closed head injury, initial encounter    BACKGROUND                                                      Per hospital discharge summary and inpatient provider notes:    Ross Caballero is a 82 year old male with history of type II diabetes, hypertension, prostate cancer currently being treated for with chemotherapy, who presents after a fall. The patient reports that he was carrying in groceries this morning with when he had a mechanical fall, tripping over carpet in his home. He landed on his left side hitting his rib cage and then hit his head as well. He did not lose consciousness.  He has a small scratch to his left cheek from his glasses.  Teeth come together normally.  His only pain is in his left lateral ribs. He was able to get up after. He reports that he took a 200 mg ibuprofen this morning before the fall. The patient denies dizziness, numbness, weakness, neck pain, hip pain or leg pain. No abdominal or back pain.  He has pain when he breaths in on the L side.    ASSESSMENT      Enrollment  Primary Care Care Coordination Status: Declined    Discharge Assessment  How are you doing now that you are home?: \"I'm fine. Well I'm kind of resting right now, just kind of sore...you know my rib cage. I think I'm progressing just fine and healing.\"  How are your symptoms? (Red Flag symptoms escalate to triage hotline per guidelines): Improved  Do you feel your condition is stable enough to be safe at home until your provider visit?: Yes  Does the patient have their discharge instructions? : Yes  Does the patient " have questions regarding their discharge instructions? : No  Were you started on any new medications or were there changes to any of your previous medications? : No  Does the patient have all of their medications?: Yes  Do you have questions regarding any of your medications? : No  Do you have all of your needed medical supplies or equipment (DME)?  (i.e. oxygen tank, CPAP, cane, etc.): Yes  Discharge follow-up appointment scheduled within 14 calendar days? : No (Patient declined [facility] assistence in scheduling a PCP f/u appt.)  Is patient agreeable to assistance with scheduling? : Yes    Post-op ([facility] CTA Only)  If the patient had a surgery or procedure, do they have any questions for a nurse?: No      PLAN                                                      Outpatient Plan:       Follow-Ups     1 Follow up with [facility] (EMERGENCY MEDICINE); As needed, If symptoms worsen  2 Follow up with Martha Cr MD (Internal Medicine) in 3 days (8/18/2022)      No future appointments.      For any urgent concerns, please contact our 24 hour nurse triage line: [phone] ([phone])         JEFFERY Malave  [phone]  [facility]  [facility]

## 2022-09-11 ENCOUNTER — HEALTH MAINTENANCE LETTER (OUTPATIENT)
Age: 82
End: 2022-09-11

## 2022-10-03 NOTE — OR NURSING
Cath care reviewed with pt.  States has had a cath before and is comfortable with cares..  
Pt dressed, up in recliner and transported to Phase 2.     
1.55

## 2022-10-25 ENCOUNTER — TRANSFERRED RECORDS (OUTPATIENT)
Dept: HEALTH INFORMATION MANAGEMENT | Facility: CLINIC | Age: 82
End: 2022-10-25

## 2023-01-17 ENCOUNTER — TRANSFERRED RECORDS (OUTPATIENT)
Dept: HEALTH INFORMATION MANAGEMENT | Facility: CLINIC | Age: 83
End: 2023-01-17

## 2023-02-28 NOTE — DISCHARGE INSTRUCTIONS
[facility]  [address] [facility] [address] [place] [address]    Call us at [phone] if you have any questions about your wounds, have redness or swelling around your wound, have a fever of 101 or greater or if you have any other problems or concerns. We answer the phone Monday through Friday 8 am to 4 pm, please leave a message as we check the voicemail frequently throughout the day.     Ross VANI Caballero      1940    Wound Dressing Change:Left Heel  Cleanse wound and surrounding skin with: prophase protocol  Cover wound with Endoform Antimicrobial then Ioplex, Edemawear and 2 layer Coflex wrap  Change dressing weekly    It is very important to follow your healthcare providers instructions. Studies indicate when compression therapy is applied as directed, healing may occur faster and more completely. Do Not remove CoFlex unless your healthcare provider tells you to remove it.  Helpful Tips  Your bandage may feel tight at first. This is normal. When the swelling goes down, it will not feel as tight.  Wear the stocking that comes with the system over the bandage to help you put on shoes and clothing  Wear comfortable shoes and walk regularly. Walking will improve your circulation which will improve healing.  Keep your bandage dry.   CoFlex maybe left on your leg for up to 7 days. After that your doctor or nurse will apply a new CoFlex.   Call your doctor or nurse if the bandage gets wet, slips down or if you have pain, tingling, numbness or discoloration.     Sergei Sofia  [email or web address]  [address] [facility] [address], [place], [place] [address] [phone]     M. Micah Anand M.D. August 12, 2021    Wound Clinic follow up as scheduled    If you had a positive experience please indicate that on your patient satisfaction survey form that [facility] will be sending you.    It was a pleasure meeting with you today.  Thank you for allowing me and my team the privilege of  caring for you today.  YOU are the reason we are here, and I truly hope we provided you with the excellent service you deserve.  Please let us know if there is anything else we can do for you so that we can be sure you are leaving completely satisfied with your care experience.      If you have any billing related questions please call the [facility] Business office at [phone]. The clinic staff does not handle billing related matters.     no

## 2023-03-09 NOTE — NURSING NOTE
"Chief Complaint   Patient presents with     Diabetes     Incontinence       Initial BP (!) 154/100 (BP Location: Left arm, Cuff Size: Adult Large)  Pulse 83  Temp 97.8  F (36.6  C) (Oral)  Ht 5' 9\" (1.753 m)  Wt 285 lb (129.3 kg)  SpO2 96%  BMI 42.09 kg/m2 Estimated body mass index is 42.09 kg/(m^2) as calculated from the following:    Height as of this encounter: 5' 9\" (1.753 m).    Weight as of this encounter: 285 lb (129.3 kg).  Medication Reconciliation: complete     Moon See MA    " 1320  Pt arrived at the [facility]   Pt mumbling, confused, lethargic. No intelligent conversation. Pt transferred from the stretcher to the bed. Pt grimaced. More alert and agitated. Pt BS table placed over bed. Lungs are clear. Pt is on RA. Hyperactive bowel sounds. Last reported BM is unknown. Pt is  incontinent of urine. Brief dry. No edema. Skin is  intact, arms dry and flaky. Feet cold to touch and cyanotic. Hands cool to touch. Facial skin very pale. Pt more restless with assessment. 9612   Dr Juma Sun at the bedside. Ordered  Lorazepam and Morphine. Then Geodon if pt remains agitated. Piroska U. 97. in L upper arm. Pt medicated with LOrazepam and Morphine. 1340  Pt shoving the table out of way so he can get out of bed. Pt pulling at his SQ.    1341   Pt medicated with SQ Geodon. Pt pulling at the covers, wanting his pants  so he can get out. Bed alarm going off. Pt redirected. 1350  Pt continues to throw  off the covers. Rn at the bedside re orienting pt. Pt states he is not gong to fight this Rn.  RN stated I just want to  help you. Pt replied about damn time.

## 2023-04-30 ENCOUNTER — HEALTH MAINTENANCE LETTER (OUTPATIENT)
Age: 83
End: 2023-04-30

## 2023-07-11 ENCOUNTER — TRANSFERRED RECORDS (OUTPATIENT)
Dept: HEALTH INFORMATION MANAGEMENT | Facility: CLINIC | Age: 83
End: 2023-07-11
Payer: MEDICARE

## 2023-08-30 ENCOUNTER — HOSPITAL ENCOUNTER (OUTPATIENT)
Dept: NUCLEAR MEDICINE | Facility: CLINIC | Age: 83
Setting detail: NUCLEAR MEDICINE
Discharge: HOME | End: 2023-08-30
Attending: INTERNAL MEDICINE
Payer: MEDICARE

## 2023-08-30 DIAGNOSIS — C61 MALIGNANT NEOPLASM OF PROSTATE (H): ICD-10-CM

## 2023-08-30 PROCEDURE — 343N000001 HC RX 343: Performed by: INTERNAL MEDICINE

## 2023-08-30 PROCEDURE — 78306 BONE IMAGING WHOLE BODY: CPT

## 2023-08-30 PROCEDURE — A9503 TC99M MEDRONATE: HCPCS | Performed by: INTERNAL MEDICINE

## 2023-08-30 RX ORDER — TC 99M MEDRONATE 20 MG/10ML
25 INJECTION, POWDER, LYOPHILIZED, FOR SOLUTION INTRAVENOUS ONCE
Status: COMPLETED | OUTPATIENT
Start: 2023-08-30 | End: 2023-08-30

## 2023-08-30 RX ADMIN — TC 99M MEDRONATE 26.7 MILLICURIE: 20 INJECTION, POWDER, LYOPHILIZED, FOR SOLUTION INTRAVENOUS at 09:33

## 2023-09-07 ENCOUNTER — HOSPITAL ENCOUNTER (OUTPATIENT)
Dept: MRI IMAGING | Facility: CLINIC | Age: 83
Discharge: HOME | End: 2023-09-07
Attending: INTERNAL MEDICINE | Admitting: INTERNAL MEDICINE
Payer: MEDICARE

## 2023-09-07 DIAGNOSIS — C61 MALIGNANT NEOPLASM OF PROSTATE (H): ICD-10-CM

## 2023-09-07 PROCEDURE — A9585 GADOBUTROL INJECTION: HCPCS | Performed by: INTERNAL MEDICINE

## 2023-09-07 PROCEDURE — 72158 MRI LUMBAR SPINE W/O & W/DYE: CPT

## 2023-09-07 PROCEDURE — 255N000002 HC RX 255 OP 636: Performed by: INTERNAL MEDICINE

## 2023-09-07 RX ORDER — GADOBUTROL 604.72 MG/ML
11 INJECTION INTRAVENOUS ONCE
Status: COMPLETED | OUTPATIENT
Start: 2023-09-07 | End: 2023-09-07

## 2023-09-07 RX ADMIN — GADOBUTROL 11 ML: 604.72 INJECTION INTRAVENOUS at 10:27

## 2023-10-03 ENCOUNTER — TRANSFERRED RECORDS (OUTPATIENT)
Dept: HEALTH INFORMATION MANAGEMENT | Facility: CLINIC | Age: 83
End: 2023-10-03
Payer: MEDICARE

## 2023-10-07 ENCOUNTER — HEALTH MAINTENANCE LETTER (OUTPATIENT)
Age: 83
End: 2023-10-07

## 2023-11-02 ENCOUNTER — OFFICE VISIT (OUTPATIENT)
Dept: FAMILY MEDICINE | Facility: CLINIC | Age: 83
End: 2023-11-02
Payer: MEDICARE

## 2023-11-02 VITALS
WEIGHT: 246 LBS | HEIGHT: 69 IN | SYSTOLIC BLOOD PRESSURE: 127 MMHG | DIASTOLIC BLOOD PRESSURE: 75 MMHG | HEART RATE: 54 BPM | OXYGEN SATURATION: 96 % | RESPIRATION RATE: 20 BRPM | TEMPERATURE: 96.9 F | BODY MASS INDEX: 36.43 KG/M2

## 2023-11-02 DIAGNOSIS — N18.32 STAGE 3B CHRONIC KIDNEY DISEASE (H): ICD-10-CM

## 2023-11-02 DIAGNOSIS — Z00.00 ENCOUNTER FOR MEDICARE ANNUAL WELLNESS EXAM: ICD-10-CM

## 2023-11-02 DIAGNOSIS — E78.5 HYPERLIPIDEMIA LDL GOAL <100: ICD-10-CM

## 2023-11-02 DIAGNOSIS — N18.30 TYPE 2 DIABETES MELLITUS WITH STAGE 3 CHRONIC KIDNEY DISEASE, WITHOUT LONG-TERM CURRENT USE OF INSULIN, UNSPECIFIED WHETHER STAGE 3A OR 3B CKD (H): ICD-10-CM

## 2023-11-02 DIAGNOSIS — I10 ESSENTIAL HYPERTENSION: ICD-10-CM

## 2023-11-02 DIAGNOSIS — Z00.00 MEDICARE ANNUAL WELLNESS VISIT, SUBSEQUENT: Primary | ICD-10-CM

## 2023-11-02 DIAGNOSIS — Q61.3 POLYCYSTIC KIDNEY DISEASE: ICD-10-CM

## 2023-11-02 DIAGNOSIS — K90.41 GLUTEN INTOLERANCE: ICD-10-CM

## 2023-11-02 DIAGNOSIS — E11.22 TYPE 2 DIABETES MELLITUS WITH STAGE 3 CHRONIC KIDNEY DISEASE, WITHOUT LONG-TERM CURRENT USE OF INSULIN, UNSPECIFIED WHETHER STAGE 3A OR 3B CKD (H): ICD-10-CM

## 2023-11-02 DIAGNOSIS — Z90.5 S/P NEPHRECTOMY: ICD-10-CM

## 2023-11-02 LAB
CHOLEST SERPL-MCNC: 209 MG/DL
CREAT SERPL-MCNC: 1.3 MG/DL (ref 0.67–1.17)
CREAT UR-MCNC: 60.8 MG/DL
EGFRCR SERPLBLD CKD-EPI 2021: 55 ML/MIN/1.73M2
HBA1C MFR BLD: 6 % (ref 0–5.6)
HDLC SERPL-MCNC: 49 MG/DL
LDLC SERPL CALC-MCNC: 104 MG/DL
MICROALBUMIN UR-MCNC: 137 MG/L
MICROALBUMIN/CREAT UR: 225.33 MG/G CR (ref 0–17)
NONHDLC SERPL-MCNC: 160 MG/DL
TRIGL SERPL-MCNC: 280 MG/DL

## 2023-11-02 PROCEDURE — 83036 HEMOGLOBIN GLYCOSYLATED A1C: CPT | Performed by: INTERNAL MEDICINE

## 2023-11-02 PROCEDURE — 82043 UR ALBUMIN QUANTITATIVE: CPT | Performed by: INTERNAL MEDICINE

## 2023-11-02 PROCEDURE — 36415 COLL VENOUS BLD VENIPUNCTURE: CPT | Performed by: INTERNAL MEDICINE

## 2023-11-02 PROCEDURE — 82570 ASSAY OF URINE CREATININE: CPT | Performed by: INTERNAL MEDICINE

## 2023-11-02 PROCEDURE — 86364 TISS TRNSGLTMNASE EA IG CLAS: CPT | Performed by: INTERNAL MEDICINE

## 2023-11-02 PROCEDURE — 82565 ASSAY OF CREATININE: CPT | Performed by: INTERNAL MEDICINE

## 2023-11-02 PROCEDURE — G0439 PPPS, SUBSEQ VISIT: HCPCS | Performed by: INTERNAL MEDICINE

## 2023-11-02 PROCEDURE — 80061 LIPID PANEL: CPT | Performed by: INTERNAL MEDICINE

## 2023-11-02 ASSESSMENT — ENCOUNTER SYMPTOMS
PALPITATIONS: 0
DIARRHEA: 0
CONSTIPATION: 0
NAUSEA: 0
COUGH: 0
NERVOUS/ANXIOUS: 0
JOINT SWELLING: 0
ABDOMINAL PAIN: 0
HEARTBURN: 0
SHORTNESS OF BREATH: 0
HEMATOCHEZIA: 0
CHILLS: 0
WEAKNESS: 1
FREQUENCY: 1
DYSURIA: 0
HEADACHES: 0
PARESTHESIAS: 0
HEMATURIA: 0
ARTHRALGIAS: 0
MYALGIAS: 0
SORE THROAT: 0
FEVER: 0
DIZZINESS: 0
EYE PAIN: 0

## 2023-11-02 ASSESSMENT — PAIN SCALES - GENERAL: PAINLEVEL: NO PAIN (0)

## 2023-11-02 ASSESSMENT — ACTIVITIES OF DAILY LIVING (ADL): CURRENT_FUNCTION: NO ASSISTANCE NEEDED

## 2023-11-02 NOTE — PROGRESS NOTES
"SUBJECTIVE:   Spenser is a 83 year old who presents for Preventive Visit.      Are you in the first 12 months of your Medicare coverage?  No    Healthy Habits:     In general, how would you rate your overall health?  Fair    Frequency of exercise:  None    Do you usually eat at least 4 servings of fruit and vegetables a day, include whole grains    & fiber and avoid regularly eating high fat or \"junk\" foods?  Yes    Taking medications regularly:  Yes    Barriers to taking medications:  None    Medication side effects:  None    Ability to successfully perform activities of daily living:  No assistance needed    Home Safety:  No safety concerns identified    Hearing Impairment:  No hearing concerns    In the past 6 months, have you been bothered by leaking of urine? Yes    In general, how would you rate your overall mental or emotional health?  Good    Additional concerns today:  No          Have you ever done Advance Care Planning? (For example, a Health Directive, POLST, or a discussion with a medical provider or your loved ones about your wishes): No, advance care planning information given to patient to review.  Patient plans to discuss their wishes with loved ones or provider.         Fall risk  Fallen 2 or more times in the past year?: No  Any fall with injury in the past year?: No    Cognitive Screening   1) Repeat 3 items (Leader, Season, Table)    2) Clock draw: NORMAL  3) 3 item recall: Recalls 2 objects   Results: NORMAL clock, 1-2 items recalled: COGNITIVE IMPAIRMENT LESS LIKELY    Mini-CogTM Copyright JAIME Amezcua. Licensed by the author for use in [facility]; reprinted with permission (renee@.[facility]). All rights reserved.      Do you have sleep apnea, excessive snoring or daytime drowsiness? : no    Reviewed and updated as needed this visit by clinical staff   Tobacco  Allergies  Meds      Soc Hx        Reviewed and updated as needed this visit by Provider                 Social History "     Tobacco Use     Smoking status: Former     Types: Cigarettes     Quit date: 1970     Years since quittin.8     Smokeless tobacco: Never   Substance Use Topics     Alcohol use: Yes     Alcohol/week: 0.0 - 1.0 standard drinks of alcohol     Comment: rare             10/30/2023    10:11 AM   Alcohol Use   Prescreen: >3 drinks/day or >7 drinks/week? Not Applicable       Do you have a current opioid prescription? No  Do you use any other controlled substances or medications that are not prescribed by a provider? None        Current providers sharing in care for this patient include:   Patient Care Team:  Martha Cr MD as PCP - General (Internal Medicine)  Martha Cr MD as Assigned PCP    The following health maintenance items are reviewed in Epic and correct as of today:  Health Maintenance   Topic Date Due     ZOSTER IMMUNIZATION (1 of 2) Never done     HEPATITIS B IMMUNIZATION (1 of 3 - Risk 3-dose series) Never done     RSV VACCINE 60+ (1 - 1-dose 60+ series) Never done     DTAP/TDAP/TD IMMUNIZATION (1 - Tdap) 2012     Pneumococcal Vaccine: 65+ Years (3 - PCV) 2017     EYE EXAM  2020     BMP  10/18/2020     DIABETIC FOOT EXAM  10/18/2020     A1C  2021     LIPID  2022     MICROALBUMIN  2022     HEMOGLOBIN  2022     INFLUENZA VACCINE (1) 2023     COVID-19 Vaccine (4 -  season) 2023     MEDICARE ANNUAL WELLNESS VISIT  2024     ANNUAL REVIEW OF HM ORDERS  2024     FALL RISK ASSESSMENT  2024     ADVANCE CARE PLANNING  2028     PHQ-2 (once per calendar year)  Completed     URINALYSIS  Completed     IPV IMMUNIZATION  Aged Out     HPV IMMUNIZATION  Aged Out     MENINGITIS IMMUNIZATION  Aged Out     RSV MONOCLONAL ANTIBODY  Aged Out     Labs reviewed in EPIC          Review of Systems  Constitutional, HEENT, cardiovascular, pulmonary, GI, , musculoskeletal, neuro, skin, endocrine and psych systems are negative,  "except as otherwise noted.    OBJECTIVE:   /75   Pulse 54   Temp 96.9  F (36.1  C) (Temporal)   Resp 20   Ht 1.753 m (5' 9\")   Wt 111.6 kg (246 lb)   SpO2 96%   BMI 36.33 kg/m   Estimated body mass index is 36.33 kg/m  as calculated from the following:    Height as of this encounter: 1.753 m (5' 9\").    Weight as of this encounter: 111.6 kg (246 lb).  Physical Exam  GENERAL: alert and no distress  EYES: Eyes grossly normal to inspection, conjunctivae and sclerae normal  HENT: normocephalic atraumatic  NECK: no asymmetry  RESP: lungs clear to auscultation   CV: regular rate and rhythm  ABDOMEN: soft, nontender, bowel sounds normal  MS: no gross musculoskeletal defects noted, no edema  SKIN: no suspicious lesions or rashes  NEURO: Normal strength and tone, mentation intact and speech normal  PSYCH: mentation appears normal, affect normal/bright    Diagnostic Test Results:  Labs reviewed in Epic    ASSESSMENT / PLAN:   Spenser was seen today for physical and health maintenance.    Diagnoses and all orders for this visit:    Medicare annual wellness visit, subsequent  -     REVIEW OF HEALTH MAINTENANCE PROTOCOL ORDERS    Type 2 diabetes mellitus with stage 3 chronic kidney disease, without long-term current use of insulin, unspecified whether stage 3a or 3b CKD (H)  -     Albumin Random Urine Quantitative with Creat Ratio; Future  -     HEMOGLOBIN A1C; Future  -     Adult Eye  Referral; Future    Essential hypertension  -     Creatinine; Future    Hyperlipidemia LDL goal <100  -     Lipid panel reflex to direct LDL Non-fasting; Future    Gluten intolerance  -     Tissue transglutaminase ham IgA and IgG; Future    Stage 3b chronic kidney disease (H)  -     Adult Nephrology  Referral; Future    Polycystic kidney disease  -     Adult Nephrology  Referral; Future    S/p nephrectomy  -     Adult Nephrology  Referral; Future        Patient has been advised of split billing " requirements and indicates understanding: Yes      COUNSELING:  Reviewed preventive health counseling, as reflected in patient instructions  Special attention given to:       Regular exercise       Healthy diet/nutrition        He reports that he quit smoking about 53 years ago. He has never used smokeless tobacco.      Appropriate preventive services were discussed with this patient, including applicable screening as appropriate for fall prevention, nutrition, physical activity, Tobacco-use cessation, weight loss and cognition.  Checklist reviewing preventive services available has been given to the patient.    Reviewed patients plan of care and provided an AVS. The Basic Care Plan (routine screening as documented in Health Maintenance) for Ross meets the Care Plan requirement. This Care Plan has been established and reviewed with the Patient.          Shelley Durham MD  [facility]    Identified Health Risks:  I have reviewed Opioid Use Disorder and Substance Use Disorder risk factors and made any needed referrals.   The patient was provided with suggestions to help him develop a healthy physical lifestyle.  He is at risk for lack of exercise and has been provided with information to increase physical activity for the benefit of his well-being.  Information on urinary incontinence and treatment options given to patient.

## 2023-11-02 NOTE — PATIENT INSTRUCTIONS
Patient Education   Personalized Prevention Plan  You are due for the preventive services outlined below.  Your care team is available to assist you in scheduling these services.  If you have already completed any of these items, please share that information with your care team to update in your medical record.  Health Maintenance Due   Topic Date Due     Zoster (Shingles) Vaccine (1 of 2) Never done     Hepatitis B Vaccine (1 of 3 - Risk 3-dose series) Never done     RSV VACCINE 60+ (1 - 1-dose 60+ series) Never done     Diptheria Tetanus Pertussis (DTAP/TDAP/TD) Vaccine (1 - Tdap) 05/05/2012     Pneumococcal Vaccine (3 - PCV) 01/24/2017     Eye Exam  02/01/2020     Basic Metabolic Panel  10/18/2020     Diabetic Foot Exam  10/18/2020     A1C Lab  12/17/2021     Cholesterol Lab  06/17/2022     Kidney Microalbumin Urine Test  06/17/2022     Hemoglobin  11/08/2022     Flu Vaccine (1) 09/01/2023     COVID-19 Vaccine (4 - 2023-24 season) 09/01/2023     Your Health Risk Assessment indicates you feel you are not in good health    A healthy lifestyle helps keep the body fit and the mind alert. It helps protect you from disease, helps you fight disease, and helps prevent chronic disease (disease that doesn't go away) from getting worse. This is important as you get older and begin to notice twinges in muscles and joints and a decline in the strength and stamina you once took for granted. A healthy lifestyle includes good healthcare, good nutrition, weight control, recreation, and regular exercise. Avoid harmful substances and do what you can to keep safe. Another part of a healthy lifestyle is stay mentally active and socially involved.    Good healthcare   Have a wellness visit every year.   If you have new symptoms, let us know right away. Don't wait until the next checkup.   Take medicines exactly as prescribed and keep your medicines in a safe place. Tell us if your medicine causes problems.   Healthy diet and weight  "control   Eat 3 or 4 small, nutritious, low-fat, high-fiber meals a day. Include a variety of fruits, vegetables, and whole-grain foods.   Make sure you get enough calcium in your diet. Calcium, vitamin D, and exercise help prevent osteoporosis (bone thinning).   If you live alone, try eating with others when you can. That way you get a good meal and have company while you eat it.   Try to keep a healthy weight. If you eat more calories than your body uses for energy, it will be stored as fat and you will gain weight.     Recreation   Recreation is not limited to sports and team events. It includes any activity that provides relaxation, interest, enjoyment, and exercise. Recreation provides an outlet for physical, mental, and social energy. It can give a sense of worth and achievement. It can help you stay healthy.    Mental Exercise and Social Involvement  Mental and emotional health is as important as physical health. Keep in touch with friends and family. Stay as active as possible. Continue to learn and challenge yourself.   Things you can do to stay mentally active are:  Learn something new, like a foreign language or musical instrument.   Play SCRABBLE or do crossword puzzles. If you cannot find people to play these games with you at home, you can play them with others on your computer through the Internet.   Join a games club--anything from card games to chess or checkers or lawn bowling.   Start a new hobby.   Go back to school.   Volunteer.   Read.   Keep up with world events.  Learning About Being Physically Active  What is physical activity?     Being physically active means doing any kind of activity that gets your body moving.  The types of physical activity that can help you get fit and stay healthy include:  Aerobic or \"cardio\" activities. These make your heart beat faster and make you breathe harder, such as brisk walking, riding a bike, or running. They strengthen your heart and lungs and build up " "your endurance.  Strength training activities. These make your muscles work against, or \"resist,\" something. Examples include lifting weights or doing push-ups. These activities help tone and strengthen your muscles and bones.  Stretches. These let you move your joints and muscles through their full range of motion. Stretching helps you be more flexible.  Reaching a balance between these three types of physical activity is important because each one contributes to your overall fitness.  What are the benefits of being active?  Being active is one of the best things you can do for your health. It helps you to:  Feel stronger and have more energy to do all the things you like to do.  Focus better at school or work.  Feel, think, and sleep better.  Reach and stay at a healthy weight.  Lose fat and build lean muscle.  Lower your risk for serious health problems, including diabetes, heart attack, high blood pressure, and some cancers.  Keep your heart, lungs, bones, muscles, and joints strong and healthy.  How can you make being active part of your life?  Start slowly. Make it your long-term goal to get at least 30 minutes of exercise on most days of the week. Walking is a good choice. You also may want to do other activities, such as running, swimming, cycling, or playing tennis or team sports.  Pick activities that you like--ones that make your heart beat faster, your muscles stronger, and your muscles and joints more flexible. If you find more than one thing you like doing, do them all. You don't have to do the same thing every day.  Get your heart pumping every day. Any activity that makes your heart beat faster and keeps it at that rate for a while counts.  Here are some great ways to get your heart beating faster:  Go for a brisk walk, run, or bike ride.  Go for a hike or swim.  Go in-line skating.  Play a game of touch football, basketball, or soccer.  Ride a bike.  Play tennis or racquetball.  Climb stairs.  Even " "some household chores can be aerobic--just do them at a faster pace. Vacuuming, raking or mowing the lawn, sweeping the garage, and washing and waxing the car all can help get your heart rate up.  Strengthen your muscles during the week. You don't have to lift heavy weights or grow big, bulky muscles to get stronger. Doing a few simple activities that make your muscles work against, or \"resist,\" something can help you get stronger.  For example, you can:  Do push-ups or sit-ups, which use your own body weight as resistance.  Lift weights or dumbbells or use stretch bands at home or in a gym or community center.  Stretch your muscles often. Stretching will help you as you become more active. It can help you stay flexible, loosen tight muscles, and avoid injury. It can also help improve your balance and posture and can be a great way to relax.  Be sure to stretch the muscles you'll be using when you work out. It's best to warm your muscles slightly before you stretch them. Walk or do some other light aerobic activity for a few minutes, and then start stretching.  When you stretch your muscles:  Do it slowly. Stretching is not about going fast or making sudden movements.  Don't push or bounce during a stretch.  Hold each stretch for at least 15 to 30 seconds, if you can. You should feel a stretch in the muscle, but not pain.  Breathe out as you do the stretch. Then breathe in as you hold the stretch. Don't hold your breath.  If you're worried about how more activity might affect your health, have a checkup before you start. Follow any special advice your doctor gives you for getting a smart start.  Where can you learn more?  Go to https://www.Wholeshare.net/patiented  Enter W332 in the search box to learn more about \"Learning About Being Physically Active.\"  Current as of: October 10, 2022               Content Version: 13.7    3624-7425 [employer].   Care instructions adapted under license by your " healthcare professional. If you have questions about a medical condition or this instruction, always ask your healthcare professional. [employer], [facility] disclaims any warranty or liability for your use of this information.      Bladder Training: Care Instructions  Your Care Instructions     Bladder training is used to treat urge incontinence and stress incontinence. Urge incontinence means that the need to urinate comes on so fast that you can't get to a toilet in time. Stress incontinence means that you leak urine because of pressure on your bladder. For example, it may happen when you laugh, cough, or lift something heavy.  Bladder training can increase how long you can wait before you have to urinate. It can also help your bladder hold more urine. And it can give you better control over the urge to urinate.  It is important to remember that bladder training takes a few weeks to a few months to make a difference. You may not see results right away, but don't give up.  Follow-up care is a key part of your treatment and safety. Be sure to make and go to all appointments, and call your doctor if you are having problems. It's also a good idea to know your test results and keep a list of the medicines you take.  How can you care for yourself at home?  Work with your doctor to come up with a bladder training program that is right for you. You may use one or more of the following methods.  Delayed urination  In the beginning, try to keep from urinating for 5 minutes after you first feel the need to go.  While you wait, take deep, slow breaths to relax. Kegel exercises can also help you delay the need to go to the bathroom.  After some practice, when you can easily wait 5 minutes to urinate, try to wait 10 minutes before you urinate.  Slowly increase the waiting period until you are able to control when you have to urinate.  Scheduled urination  Empty your bladder when you first wake up in the morning.  Schedule  "times throughout the day when you will urinate.  Start by going to the bathroom every hour, even if you don't need to go.  Slowly increase the time between trips to the bathroom.  When you have found a schedule that works well for you, keep doing it.  If you wake up during the night and have to urinate, do it. Apply your schedule to waking hours only.  Kegel exercises  These tighten and strengthen pelvic muscles, which can help you control the flow of urine. (If doing these exercises causes pain, stop doing them and talk with your doctor.) To do Kegel exercises:  Squeeze your muscles as if you were trying not to pass gas. Or squeeze your muscles as if you were stopping the flow of urine. Your belly, legs, and buttocks shouldn't move.  Hold the squeeze for 3 seconds, then relax for 5 to 10 seconds.  Start with 3 seconds, then add 1 second each week until you are able to squeeze for 10 seconds.  Repeat the exercise 10 times a session. Do 3 to 8 sessions a day.  When should you call for help?  Watch closely for changes in your health, and be sure to contact your doctor if:    Your incontinence is getting worse.     You do not get better as expected.   Where can you learn more?  Go to https://www.Perk.net/patiented  Enter V684 in the search box to learn more about \"Bladder Training: Care Instructions.\"  Current as of: March 1, 2023               Content Version: 13.7    8699-5604 [employer].   Care instructions adapted under license by your healthcare professional. If you have questions about a medical condition or this instruction, always ask your healthcare professional. [employer] disclaims any warranty or liability for your use of this information.         "

## 2023-11-06 LAB
TTG IGA SER-ACNC: 0.5 U/ML
TTG IGG SER-ACNC: 0.7 U/ML

## 2024-03-21 ENCOUNTER — TRANSFERRED RECORDS (OUTPATIENT)
Dept: HEALTH INFORMATION MANAGEMENT | Facility: CLINIC | Age: 84
End: 2024-03-21
Payer: MEDICARE

## 2024-03-21 ENCOUNTER — MEDICAL CORRESPONDENCE (OUTPATIENT)
Dept: HEALTH INFORMATION MANAGEMENT | Facility: CLINIC | Age: 84
End: 2024-03-21
Payer: MEDICARE

## 2024-03-22 ENCOUNTER — TRANSCRIBE ORDERS (OUTPATIENT)
Dept: OTHER | Age: 84
End: 2024-03-22

## 2024-03-22 DIAGNOSIS — C61 PRIMARY MALIGNANT NEOPLASM OF PROSTATE (H): Primary | ICD-10-CM

## 2024-03-22 DIAGNOSIS — R00.2 PALPITATIONS: ICD-10-CM

## 2024-04-26 ENCOUNTER — LAB (OUTPATIENT)
Dept: LAB | Facility: CLINIC | Age: 84
End: 2024-04-26
Payer: MEDICARE

## 2024-04-26 ENCOUNTER — OFFICE VISIT (OUTPATIENT)
Dept: CARDIOLOGY | Facility: CLINIC | Age: 84
End: 2024-04-26
Payer: MEDICARE

## 2024-04-26 VITALS
SYSTOLIC BLOOD PRESSURE: 122 MMHG | OXYGEN SATURATION: 93 % | DIASTOLIC BLOOD PRESSURE: 73 MMHG | WEIGHT: 244.1 LBS | BODY MASS INDEX: 36.99 KG/M2 | HEIGHT: 68 IN | HEART RATE: 95 BPM

## 2024-04-26 DIAGNOSIS — N18.31 TYPE 2 DIABETES MELLITUS WITH STAGE 3A CHRONIC KIDNEY DISEASE, WITHOUT LONG-TERM CURRENT USE OF INSULIN (H): ICD-10-CM

## 2024-04-26 DIAGNOSIS — R00.2 PALPITATIONS: ICD-10-CM

## 2024-04-26 DIAGNOSIS — N18.31 STAGE 3A CHRONIC KIDNEY DISEASE (H): ICD-10-CM

## 2024-04-26 DIAGNOSIS — E11.22 TYPE 2 DIABETES MELLITUS WITH STAGE 3A CHRONIC KIDNEY DISEASE, WITHOUT LONG-TERM CURRENT USE OF INSULIN (H): ICD-10-CM

## 2024-04-26 DIAGNOSIS — I10 ESSENTIAL HYPERTENSION: ICD-10-CM

## 2024-04-26 DIAGNOSIS — E66.01 MORBID OBESITY (H): ICD-10-CM

## 2024-04-26 DIAGNOSIS — R53.82 CHRONIC FATIGUE: ICD-10-CM

## 2024-04-26 DIAGNOSIS — I49.1 PREMATURE ATRIAL COMPLEXES: Primary | ICD-10-CM

## 2024-04-26 DIAGNOSIS — C61 PRIMARY MALIGNANT NEOPLASM OF PROSTATE (H): ICD-10-CM

## 2024-04-26 DIAGNOSIS — C61 PROSTATE CANCER (H): ICD-10-CM

## 2024-04-26 LAB — TSH SERPL DL<=0.005 MIU/L-ACNC: 1.89 UIU/ML (ref 0.3–4.2)

## 2024-04-26 PROCEDURE — 36415 COLL VENOUS BLD VENIPUNCTURE: CPT | Performed by: INTERNAL MEDICINE

## 2024-04-26 PROCEDURE — 99204 OFFICE O/P NEW MOD 45 MIN: CPT | Performed by: INTERNAL MEDICINE

## 2024-04-26 PROCEDURE — 84443 ASSAY THYROID STIM HORMONE: CPT | Performed by: INTERNAL MEDICINE

## 2024-04-26 PROCEDURE — 93000 ELECTROCARDIOGRAM COMPLETE: CPT | Performed by: INTERNAL MEDICINE

## 2024-04-26 RX ORDER — OMEGA-3 FATTY ACIDS/FISH OIL 300-1000MG
200 CAPSULE ORAL EVERY 4 HOURS PRN
COMMUNITY

## 2024-04-26 NOTE — LETTER
4/26/2024    Martha Cr MD  [address] [facility] [address]  [facility] [address]    RE: Ross Caballero       Dear Colleague,     I had the pleasure of seeing Ross Caballero in the [facility].  HPI and Plan:   I had the pleasure of seeing Spenser Caballero in cardiology clinic for palpitations.    He is a pleasant 83-year-old male.  He has diagnosis of metastatic prostate cancer.  Had metastasized to the lymph nodes.  He follows with [facility] with Dr. Dreyer.    He was close diagnosed with cancer and sometime around 2017.  In 2016 he had nephrectomy on the left side due to polycystic kidney disease as well as a TURP procedure for benign prostate hypertrophy.  Subsequently he was noted to have a bladder mass related to lymph node enlargement causing hydronephrosis.  He was eventually diagnosed with prostate cancer and underwent 6-7 cycles of chemotherapy with docetaxel.  He was then started on hormonal therapy and now is on Lupron.  He also takes Xtandi.    His main complaint has been significant fatigue with Xtandi.  That limits his activity.  He denies any chest pain or shortness of breath.    He also notices irregular heartbeat when he sleeps on his left side and can hear those extra beats in his ears.  He was diagnosed with premature complexes since age 40 but he cannot recall if it was premature atrial or ventricular complexes.    Because of these irregular heartbeat complaints, he was referred to us.    Patient denies any dizziness, syncope.    EKG done today was reviewed by me and revealed sinus rhythm with first-degree AV block and premature atrial complexes and sinus arrhythmia.  I reviewed previous EKGs including from 2020 which are also revealed sinus rhythm with premature atrial complexes.    We reviewed his labs from November.  His creatinine was 1.3 and has stage IIIa renal insufficiency.  Potassium was normal.  His total cholesterol was 209 last November with .  He has no recent TSH.  He  is type II diabetic and last A1c was 6.  Hemoglobin 13.6.    On exam, slightly irregular S1 and S2.  No murmurs.  Chest was clear to auscultation.  No carotid bruits.  He is overweight.  No peripheral edema.    Impression    1.  Sensation of palpitations related to premature atrial complexes, this is not new  2.  Metastatic prostate cancer to the lymph nodes, s/p chemotherapy several years ago with docetaxel and now with hormone therapy with Lupron and now Xtandi  3.  Severe fatigue attributed to Xtandi  4.  Type 2 diabetes  5.  Chronic renal insufficiency with previous nephrectomy due to polycystic kidney disease    Discussion  At this time patient is minimally symptomatic and has a sensation of irregular heartbeat but no significant palpitations or dizziness or syncope.  I would suggest a 3-day Zio patch monitor to assess for burden of PACs and see if there are any sustained atrial arrhythmias.  In addition we discussed importance of lowering intake of caffeinated beverages.    I also will draw TSH to make sure there is no evidence of hypo or hyperthyroidism.    We discussed management options for this.  He is minimally symptomatic and therefore observation would be my preference.  The other option would be to try some AV node blockers like beta-blockers but given her underlying first-degree AV block and already having symptoms of fatigue, he may feel worse.  Therefore we mutually decided to hold off on any medications.    Will also get an echocardiogram to ensure that there is no evidence of underlying structural heart disease.    Further recommendations based on the results of the labs, Zio patch monitor and echocardiogram.  I will have my nurse call him with the results and my further recommendations.    He is going to discuss with his oncologist regarding lowering the dose of Xtandi or discontinuing it as he feels that the fatigue is affecting his quality of life.    Thank you for allowing us to participate in  care of this nice patient.    Sincerely,    Jared Chiu MD      Today's clinic visit entailed:  Review of prior external note(s) from - Outside records from [facility]  Review of the result(s) of each unique test - EKG, lipid profile, Hb, A1c  The following tests were independently interpreted by me as noted in my documentation: EKG from 2020  Ordering of each unique test    Provider  Link to MDM Help Grid     The level of medical decision making during this visit was of moderate complexity.      Orders Placed This Encounter   Procedures    TSH with free T4 reflex    EKG 12-lead complete w/read - Clinics (performed today)    Echocardiogram Complete       Orders Placed This Encounter   Medications    ibuprofen (ADVIL/MOTRIN) 200 MG capsule     Sig: Take 200 mg by mouth every 4 hours as needed for fever       There are no discontinued medications.      Encounter Diagnoses   Name Primary?    Primary malignant neoplasm of prostate (H)     Palpitations     Essential hypertension     Type 2 diabetes mellitus with stage 3a chronic kidney disease, without long-term current use of insulin (H)     Morbid obesity (H)     Prostate cancer (H)     Stage 3a chronic kidney disease (H)     Premature atrial complexes Yes    Chronic fatigue        CURRENT MEDICATIONS:  Current Outpatient Medications   Medication Sig Dispense Refill    acetaminophen (TYLENOL) 325 MG tablet Take 325-650 mg by mouth every 6 hours as needed for mild pain Patient reports taking ES Tylenol      Alcohol Swabs (ALCOHOL PADS) 70 % PADS 1 Application 2 times daily 100 each 3    blood glucose (NO BRAND SPECIFIED) lancets standard Use to test blood sugar 2 times daily or as directed. 100 each 3    blood glucose (NO BRAND SPECIFIED) test strip Check blood sugar twice a day 100 strip 3    blood glucose monitoring (NO BRAND SPECIFIED) meter device kit Use to test blood sugar 2 times daily or as directed. 1 kit 0    enzalutamide (XTANDI) 40 MG capsule 160 mg daily       ibuprofen (ADVIL/MOTRIN) 200 MG capsule Take 200 mg by mouth every 4 hours as needed for fever      Leuprolide Acetate, 4 Month, (LUPRON DEPOT, 4-MONTH, IM)       ondansetron (ZOFRAN-ODT) 8 MG ODT tab       tamsulosin (FLOMAX) 0.4 MG capsule Take 2 capsules (0.8 mg) by mouth At Bedtime - Fasting physical due for further refills 180 capsule 1       ALLERGIES     Allergies   Allergen Reactions    No Known Allergies        PAST MEDICAL HISTORY:  Past Medical History:   Diagnosis Date    Benign essential hypertension     BPH (benign prostatic hyperplasia)     flomax     Calculus of kidney 2002    with lithrotripsy     Diabetes mellitus (H)     Melanoma (H)     Obese     Osteoarthritis, knee     has had synvisc type shot     Osteomyelitis (H)     toe amputation    Prostate cancer (H) 10/18/2019    Pyelonephritis 7/20/2016       PAST SURGICAL HISTORY:  Past Surgical History:   Procedure Laterality Date    AMPUTATION of toe      COLONOSCOPY      CYSTOSCOPY N/A 11/16/2021    Procedure: CYSTOSCOPY;  Surgeon: Gurdeep Hickman MD;  Location:  OR    CYSTOSCOPY FLEXIBLE, CYOABLATION PROSTATE N/A 9/27/2018    Procedure: CYSTOSCOPY FLEXIBLE, CRYOABLATION PROSTATE;  FLEXIBLE CYSTOSCOPY, CRYOABLATION OF THE PROSTATE ;  Surgeon: Nima Calvert MD;  Location:  OR    GENITOURINARY SURGERY Left     Nephrectomy    HERNIA REPAIR      Umbilical hernia     LASER KTP GREEN LIGHT PHOTOSELECTIVE VAPORIZATION PROSTATE N/A 11/16/2021    Procedure: GREEN LIGHT LASER TRANSURETHRAL RESECTION PROSTATE;  Surgeon: Gurdeep Hickman MD;  Location: [facility] OR    melanoma removal         FAMILY HISTORY:  Family History   Problem Relation Age of Onset    Breast Cancer Mother     Coronary Artery Disease Father     Breast Cancer Sister        SOCIAL HISTORY:  Social History     Socioeconomic History    Marital status:      Spouse name: None    Number of children: None    Years of education: None    Highest education level: None   Tobacco Use  "   Smoking status: Former     Current packs/day: 0.00     Types: Cigarettes     Quit date: 1970     Years since quittin.3    Smokeless tobacco: Never   Vaping Use    Vaping status: Never Used   Substance and Sexual Activity    Alcohol use: Not Currently     Alcohol/week: 0.0 - 1.0 standard drinks of alcohol    Drug use: No    Sexual activity: Yes     Partners: Female     Social Determinants of Health     Financial Resource Strain: Low Risk  (10/30/2023)    Financial Resource Strain     Within the past 12 months, have you or your family members you live with been unable to get utilities (heat, electricity) when it was really needed?: No   Food Insecurity: Low Risk  (10/30/2023)    Food Insecurity     Within the past 12 months, did you worry that your food would run out before you got money to buy more?: No     Within the past 12 months, did the food you bought just not last and you didn t have money to get more?: No   Transportation Needs: Low Risk  (10/30/2023)    Transportation Needs     Within the past 12 months, has lack of transportation kept you from medical appointments, getting your medicines, non-medical meetings or appointments, work, or from getting things that you need?: No   Housing Stability: Low Risk  (10/30/2023)    Housing Stability     Do you have housing? : Yes     Are you worried about losing your housing?: No       Review of Systems:  Skin:          Eyes:         ENT:         Respiratory:  Positive for dyspnea on exertion     Cardiovascular:  Negative;chest pain;syncope or near-syncope;cyanosis;edema Positive for;palpitations;fatigue feels irregular heart beat at night, wears compression stockings  Gastroenterology:        Genitourinary:  Positive for   prostate cancer  Musculoskeletal:         Neurologic:         Psychiatric:         Heme/Lymph/Imm:         Endocrine:  Positive for diabetes      Physical Exam:  Vitals: /73   Pulse 95   Ht 1.727 m (5' 8\")   Wt 110.7 kg (244 lb " 1.6 oz)   SpO2 93%   BMI 37.12 kg/m          CC  Patrick Dreyer, DO  [facility]  [address] [place] [address]      Thank you for allowing me to participate in the care of your patient.      Sincerely,     Jared Chiu MD     [facility]

## 2024-04-26 NOTE — PROGRESS NOTES
HPI and Plan:   I had the pleasure of seeing Spenser Caballero in cardiology clinic for palpitations.    He is a pleasant 83-year-old male.  He has diagnosis of metastatic prostate cancer.  Had metastasized to the lymph nodes.  He follows with [facility] with Dr. Dreyer.    He was close diagnosed with cancer and sometime around 2017.  In 2016 he had nephrectomy on the left side due to polycystic kidney disease as well as a TURP procedure for benign prostate hypertrophy.  Subsequently he was noted to have a bladder mass related to lymph node enlargement causing hydronephrosis.  He was eventually diagnosed with prostate cancer and underwent 6-7 cycles of chemotherapy with docetaxel.  He was then started on hormonal therapy and now is on Lupron.  He also takes Xtandi.    His main complaint has been significant fatigue with Xtandi.  That limits his activity.  He denies any chest pain or shortness of breath.    He also notices irregular heartbeat when he sleeps on his left side and can hear those extra beats in his ears.  He was diagnosed with premature complexes since age 40 but he cannot recall if it was premature atrial or ventricular complexes.    Because of these irregular heartbeat complaints, he was referred to us.    Patient denies any dizziness, syncope.    EKG done today was reviewed by me and revealed sinus rhythm with first-degree AV block and premature atrial complexes and sinus arrhythmia.  I reviewed previous EKGs including from 2020 which are also revealed sinus rhythm with premature atrial complexes.    We reviewed his labs from November.  His creatinine was 1.3 and has stage IIIa renal insufficiency.  Potassium was normal.  His total cholesterol was 209 last November with .  He has no recent TSH.  He is type II diabetic and last A1c was 6.  Hemoglobin 13.6.    On exam, slightly irregular S1 and S2.  No murmurs.  Chest was clear to auscultation.  No carotid bruits.  He is overweight.  No peripheral  edema.    Impression    1.  Sensation of palpitations related to premature atrial complexes, this is not new  2.  Metastatic prostate cancer to the lymph nodes, s/p chemotherapy several years ago with docetaxel and now with hormone therapy with Lupron and now Xtandi  3.  Severe fatigue attributed to Xtandi  4.  Type 2 diabetes  5.  Chronic renal insufficiency with previous nephrectomy due to polycystic kidney disease    Discussion  At this time patient is minimally symptomatic and has a sensation of irregular heartbeat but no significant palpitations or dizziness or syncope.  I would suggest a 3-day Zio patch monitor to assess for burden of PACs and see if there are any sustained atrial arrhythmias.  In addition we discussed importance of lowering intake of caffeinated beverages.    I also will draw TSH to make sure there is no evidence of hypo or hyperthyroidism.    We discussed management options for this.  He is minimally symptomatic and therefore observation would be my preference.  The other option would be to try some AV node blockers like beta-blockers but given her underlying first-degree AV block and already having symptoms of fatigue, he may feel worse.  Therefore we mutually decided to hold off on any medications.    Will also get an echocardiogram to ensure that there is no evidence of underlying structural heart disease.    Further recommendations based on the results of the labs, Zio patch monitor and echocardiogram.  I will have my nurse call him with the results and my further recommendations.    He is going to discuss with his oncologist regarding lowering the dose of Xtandi or discontinuing it as he feels that the fatigue is affecting his quality of life.    Thank you for allowing us to participate in care of this nice patient.    Sincerely,    Jared Chiu MD      Today's clinic visit entailed:  Review of prior external note(s) from - Outside records from [facility]  Review of the result(s) of  each unique test - EKG, lipid profile, Hb, A1c  The following tests were independently interpreted by me as noted in my documentation: EKG from 2020  Ordering of each unique test    Provider  Link to MDM Help Grid     The level of medical decision making during this visit was of moderate complexity.      Orders Placed This Encounter   Procedures    TSH with free T4 reflex    EKG 12-lead complete w/read - Clinics (performed today)    Echocardiogram Complete       Orders Placed This Encounter   Medications    ibuprofen (ADVIL/MOTRIN) 200 MG capsule     Sig: Take 200 mg by mouth every 4 hours as needed for fever       There are no discontinued medications.      Encounter Diagnoses   Name Primary?    Primary malignant neoplasm of prostate (H)     Palpitations     Essential hypertension     Type 2 diabetes mellitus with stage 3a chronic kidney disease, without long-term current use of insulin (H)     Morbid obesity (H)     Prostate cancer (H)     Stage 3a chronic kidney disease (H)     Premature atrial complexes Yes    Chronic fatigue        CURRENT MEDICATIONS:  Current Outpatient Medications   Medication Sig Dispense Refill    acetaminophen (TYLENOL) 325 MG tablet Take 325-650 mg by mouth every 6 hours as needed for mild pain Patient reports taking ES Tylenol      Alcohol Swabs (ALCOHOL PADS) 70 % PADS 1 Application 2 times daily 100 each 3    blood glucose (NO BRAND SPECIFIED) lancets standard Use to test blood sugar 2 times daily or as directed. 100 each 3    blood glucose (NO BRAND SPECIFIED) test strip Check blood sugar twice a day 100 strip 3    blood glucose monitoring (NO BRAND SPECIFIED) meter device kit Use to test blood sugar 2 times daily or as directed. 1 kit 0    enzalutamide (XTANDI) 40 MG capsule 160 mg daily      ibuprofen (ADVIL/MOTRIN) 200 MG capsule Take 200 mg by mouth every 4 hours as needed for fever      Leuprolide Acetate, 4 Month, (LUPRON DEPOT, 4-MONTH, IM)       ondansetron (ZOFRAN-ODT) 8 MG  ODT tab       tamsulosin (FLOMAX) 0.4 MG capsule Take 2 capsules (0.8 mg) by mouth At Bedtime - Fasting physical due for further refills 180 capsule 1       ALLERGIES     Allergies   Allergen Reactions    No Known Allergies        PAST MEDICAL HISTORY:  Past Medical History:   Diagnosis Date    Benign essential hypertension     BPH (benign prostatic hyperplasia)     flomax     Calculus of kidney     with lithrotripsy     Diabetes mellitus (H)     Melanoma (H)     Obese     Osteoarthritis, knee     has had synvisc type shot     Osteomyelitis (H)     toe amputation    Prostate cancer (H) 10/18/2019    Pyelonephritis 2016       PAST SURGICAL HISTORY:  Past Surgical History:   Procedure Laterality Date    AMPUTATION of toe      COLONOSCOPY      CYSTOSCOPY N/A 2021    Procedure: CYSTOSCOPY;  Surgeon: Gurdeep Hickman MD;  Location: [facility] OR    CYSTOSCOPY FLEXIBLE, CYOABLATION PROSTATE N/A 2018    Procedure: CYSTOSCOPY FLEXIBLE, CRYOABLATION PROSTATE;  FLEXIBLE CYSTOSCOPY, CRYOABLATION OF THE PROSTATE ;  Surgeon: Nima Calvert MD;  Location: [facility] OR    GENITOURINARY SURGERY Left     Nephrectomy    HERNIA REPAIR      Umbilical hernia     LASER KTP GREEN LIGHT PHOTOSELECTIVE VAPORIZATION PROSTATE N/A 2021    Procedure: GREEN LIGHT LASER TRANSURETHRAL RESECTION PROSTATE;  Surgeon: Gurdeep Hickman MD;  Location: [facility] OR    melanoma removal         FAMILY HISTORY:  Family History   Problem Relation Age of Onset    Breast Cancer Mother     Coronary Artery Disease Father     Breast Cancer Sister        SOCIAL HISTORY:  Social History     Socioeconomic History    Marital status:      Spouse name: None    Number of children: None    Years of education: None    Highest education level: None   Tobacco Use    Smoking status: Former     Current packs/day: 0.00     Types: Cigarettes     Quit date: 1970     Years since quittin.3    Smokeless tobacco: Never   Vaping Use    Vaping status: Never  "Used   Substance and Sexual Activity    Alcohol use: Not Currently     Alcohol/week: 0.0 - 1.0 standard drinks of alcohol    Drug use: No    Sexual activity: Yes     Partners: Female     Social Determinants of Health     Financial Resource Strain: Low Risk  (10/30/2023)    Financial Resource Strain     Within the past 12 months, have you or your family members you live with been unable to get utilities (heat, electricity) when it was really needed?: No   Food Insecurity: Low Risk  (10/30/2023)    Food Insecurity     Within the past 12 months, did you worry that your food would run out before you got money to buy more?: No     Within the past 12 months, did the food you bought just not last and you didn t have money to get more?: No   Transportation Needs: Low Risk  (10/30/2023)    Transportation Needs     Within the past 12 months, has lack of transportation kept you from medical appointments, getting your medicines, non-medical meetings or appointments, work, or from getting things that you need?: No   Housing Stability: Low Risk  (10/30/2023)    Housing Stability     Do you have housing? : Yes     Are you worried about losing your housing?: No       Review of Systems:  Skin:          Eyes:         ENT:         Respiratory:  Positive for dyspnea on exertion     Cardiovascular:  Negative;chest pain;syncope or near-syncope;cyanosis;edema Positive for;palpitations;fatigue feels irregular heart beat at night, wears compression stockings  Gastroenterology:        Genitourinary:  Positive for   prostate cancer  Musculoskeletal:         Neurologic:         Psychiatric:         Heme/Lymph/Imm:         Endocrine:  Positive for diabetes      Physical Exam:  Vitals: /73   Pulse 95   Ht 1.727 m (5' 8\")   Wt 110.7 kg (244 lb 1.6 oz)   SpO2 93%   BMI 37.12 kg/m          CC Patrick Dreyer, DO  [facility]  [address] [place] [address]                "

## 2024-04-27 ENCOUNTER — ORDERS ONLY (AUTO-RELEASED) (OUTPATIENT)
Dept: CARDIOLOGY | Facility: CLINIC | Age: 84
End: 2024-04-27
Payer: MEDICARE

## 2024-04-27 DIAGNOSIS — R00.2 PALPITATIONS: ICD-10-CM

## 2024-04-27 DIAGNOSIS — I49.1 PREMATURE ATRIAL COMPLEXES: ICD-10-CM

## 2024-04-29 ENCOUNTER — HOSPITAL ENCOUNTER (OUTPATIENT)
Dept: CARDIOLOGY | Facility: CLINIC | Age: 84
Discharge: HOME | End: 2024-04-29
Attending: INTERNAL MEDICINE | Admitting: INTERNAL MEDICINE
Payer: MEDICARE

## 2024-04-29 ENCOUNTER — TELEPHONE (OUTPATIENT)
Dept: CARDIOLOGY | Facility: CLINIC | Age: 84
End: 2024-04-29

## 2024-04-29 DIAGNOSIS — R00.2 PALPITATIONS: ICD-10-CM

## 2024-04-29 DIAGNOSIS — I49.1 PREMATURE ATRIAL COMPLEXES: ICD-10-CM

## 2024-04-29 LAB — LVEF ECHO: NORMAL

## 2024-04-29 PROCEDURE — 93306 TTE W/DOPPLER COMPLETE: CPT | Mod: 26 | Performed by: INTERNAL MEDICINE

## 2024-04-29 PROCEDURE — 93306 TTE W/DOPPLER COMPLETE: CPT

## 2024-04-29 NOTE — TELEPHONE ENCOUNTER
Echo reveals normal EF which is reassuring. Aortic root borderline dilated. Will benefit from god BP control. Zio pending;

## 2024-04-29 NOTE — TELEPHONE ENCOUNTER
Echo post office visit:  Interpretation Summary     1. The left ventricle is normal in size. There is moderate concentric left  ventricular hypertrophy. Left ventricular systolic function is normal. The  visual ejection fraction is 55-60%. Diastolic Doppler findings (E/E' ratio  and/or other parameters) suggest left ventricular filling pressures are  normal. No regional wall motion abnormalities noted.  2. The right ventricle is normal size. The right ventricular systolic function  is normal.  3. Trace mitral and tricuspid regurgitation.  4. The aortic Sinus(es) of Valsalva are mildly dilated. Normal size ascending  aorta.  5. No pericardial effusion.  6. No previous study for comparison.    CARRILLO Isaac RN

## 2024-04-30 ENCOUNTER — TELEPHONE (OUTPATIENT)
Dept: CARDIOLOGY | Facility: CLINIC | Age: 84
End: 2024-04-30
Payer: MEDICARE

## 2024-04-30 NOTE — TELEPHONE ENCOUNTER
[facility] Call Center    Phone Message    May a detailed message be left on voicemail: yes     Reason for Call: Other: Spenser called back after missing a call from his team to discuss his results. Unable to see who tried to reach out to him. Please give Spenser another call to discuss. Thank you!     Action Taken: Other: Cardiology    Travel Screening: Not Applicable    Thank you!  [facility]

## 2024-04-30 NOTE — TELEPHONE ENCOUNTER
Called Pt informed him of echo, and monitor BP.  Confirmed zio ordered, and was mailed did inform Pt he should see monitor in next 24 hrs. CARRILLO Isaac RN

## 2024-04-30 NOTE — TELEPHONE ENCOUNTER
[facility] Call Center    Phone Message    May a detailed message be left on voicemail: yes     Reason for Call: Other: Patient called requesting to speak with  in regards to his lab and echo results. Please call back to further discuss.     Action Taken: Message routed to:  Other: Cardiology    Travel Screening: Not Applicable    Thank you!  [facility]

## 2024-05-04 ENCOUNTER — HEALTH MAINTENANCE LETTER (OUTPATIENT)
Age: 84
End: 2024-05-04

## 2024-05-14 PROCEDURE — 93244 EXT ECG>48HR<7D REV&INTERPJ: CPT | Performed by: INTERNAL MEDICINE

## 2024-05-14 NOTE — TELEPHONE ENCOUNTER
Called Pt and reviewed Holter with DR Chiu's recommendations. Pt did not want to try a beta blocker, and feels his medication XTANDI is causing his fatigue. Pt asked if DR Chiu would do consult with oncology. Informed Pt if he feels that oncology drug is causing his issue, he can call that office and ask nurse to inform  , and Pt also has Office visit next month. CARRILLO Isaac RN

## 2024-05-14 NOTE — TELEPHONE ENCOUNTER
ZIO results:  Narrative & Impression  Indication: Palpitations  3 day Ziopatch monitor  Baseline sinus rhythm with heart rates ranging , average 82 bpm.  Frequent short runs of SVT, longest lasting 11 seconds.  Frequent PACs, 16% burden overall  No sustained arrhythmias.  Patient triggered events correlated with runs of SVT and PACs    CARRILLO Isaac rN

## 2024-05-14 NOTE — TELEPHONE ENCOUNTER
Patient has 16% PACs and very short runs of SVT.  I would suggest just observation at this time as the burden of SVT is very low.  We can try some low-dose beta-blockers but he does have a first-degree AV block and fatigue already and that could make it worse.  He should lower intake of caffeine.  Let me know if he wants to try low-dose beta-blocker.  Otherwise, he can follow-up with PMD and oncology.

## 2024-05-30 ENCOUNTER — TRANSFERRED RECORDS (OUTPATIENT)
Dept: HEALTH INFORMATION MANAGEMENT | Facility: CLINIC | Age: 84
End: 2024-05-30
Payer: MEDICARE

## 2024-06-13 ENCOUNTER — TRANSFERRED RECORDS (OUTPATIENT)
Dept: HEALTH INFORMATION MANAGEMENT | Facility: CLINIC | Age: 84
End: 2024-06-13
Payer: MEDICARE

## 2024-06-17 ENCOUNTER — TELEPHONE (OUTPATIENT)
Dept: CARDIOLOGY | Facility: CLINIC | Age: 84
End: 2024-06-17
Payer: MEDICARE

## 2024-06-17 NOTE — TELEPHONE ENCOUNTER
RN called patient and advised him that Dr. Chiu is currently out of the office this week, but RN will send to Dr. Chiu to inquire if he feels it is safe for patient to take methylphenidate 10mg given his underlying cardiac status and we can update him next week with Dr. Chiu's recommendations. Patient verbalized understanding and is in agreement with plan.

## 2024-06-17 NOTE — TELEPHONE ENCOUNTER
[facility] Call Center    Phone Message    May a detailed message be left on voicemail: yes     Reason for Call: Other: Patient would like a call back.   Patient has been prescribed methylphenidate 10mg twice a day. He wants to know if its okay to take.  Pharmacist is questioning whether patient should be taking this .     Action Taken: Other: cardio    Travel Screening: Not Applicable     Date of Service:

## 2024-06-24 NOTE — TELEPHONE ENCOUNTER
RN called patient and reviewed with him Dr. Chiu's recommendation's below. Patient verbalized understanding and was appreciative of the information and advised RN he will not be taking this medication.       Methylphenidate can increase blood pressure heart rate and increased risk of cardiac events.  Given he has history of palpitations, I am a bit hesitant for him to use his medication unless absolutely necessary.  He should discuss with the prescribing physician the risks and benefits and be aware that there could be some cardiac side effects to this medication.   Dr. Chiu

## 2024-06-24 NOTE — TELEPHONE ENCOUNTER
Methylphenidate can increase blood pressure heart rate and increased risk of cardiac events.  Given he has history of palpitations, I am a bit hesitant for him to use his medication unless absolutely necessary.  He should discuss with the prescribing physician the risks and benefits and be aware that there could be some cardiac side effects to this medication.

## 2024-06-27 ENCOUNTER — TRANSFERRED RECORDS (OUTPATIENT)
Dept: HEALTH INFORMATION MANAGEMENT | Facility: CLINIC | Age: 84
End: 2024-06-27
Payer: MEDICARE

## 2024-08-02 ENCOUNTER — TRANSFERRED RECORDS (OUTPATIENT)
Dept: HEALTH INFORMATION MANAGEMENT | Facility: CLINIC | Age: 84
End: 2024-08-02
Payer: MEDICARE

## 2024-09-06 ENCOUNTER — TRANSFERRED RECORDS (OUTPATIENT)
Dept: HEALTH INFORMATION MANAGEMENT | Facility: CLINIC | Age: 84
End: 2024-09-06
Payer: MEDICARE

## 2024-09-12 ENCOUNTER — HOSPITAL ENCOUNTER (EMERGENCY)
Facility: CLINIC | Age: 84
Discharge: HOME | End: 2024-09-12
Attending: EMERGENCY MEDICINE | Admitting: EMERGENCY MEDICINE
Payer: MEDICARE

## 2024-09-12 VITALS
TEMPERATURE: 97.1 F | DIASTOLIC BLOOD PRESSURE: 72 MMHG | HEART RATE: 69 BPM | SYSTOLIC BLOOD PRESSURE: 127 MMHG | OXYGEN SATURATION: 97 % | RESPIRATION RATE: 18 BRPM

## 2024-09-12 DIAGNOSIS — M79.604 RIGHT LEG PAIN: ICD-10-CM

## 2024-09-12 PROCEDURE — 99283 EMERGENCY DEPT VISIT LOW MDM: CPT

## 2024-09-12 RX ORDER — LORATADINE 10 MG/1
10 TABLET ORAL DAILY
Qty: 30 TABLET | Refills: 0 | Status: SHIPPED | OUTPATIENT
Start: 2024-09-12

## 2024-09-12 RX ORDER — OXYCODONE HYDROCHLORIDE 5 MG/1
5 TABLET ORAL EVERY 6 HOURS PRN
Qty: 6 TABLET | Refills: 0 | Status: SHIPPED | OUTPATIENT
Start: 2024-09-12 | End: 2024-09-15

## 2024-09-12 ASSESSMENT — ACTIVITIES OF DAILY LIVING (ADL)
ADLS_ACUITY_SCORE: 35

## 2024-09-12 ASSESSMENT — COLUMBIA-SUICIDE SEVERITY RATING SCALE - C-SSRS
6. HAVE YOU EVER DONE ANYTHING, STARTED TO DO ANYTHING, OR PREPARED TO DO ANYTHING TO END YOUR LIFE?: NO
1. IN THE PAST MONTH, HAVE YOU WISHED YOU WERE DEAD OR WISHED YOU COULD GO TO SLEEP AND NOT WAKE UP?: NO
2. HAVE YOU ACTUALLY HAD ANY THOUGHTS OF KILLING YOURSELF IN THE PAST MONTH?: NO

## 2024-09-12 NOTE — ED PROVIDER NOTES
"  Emergency Department Note      History of Present Illness     Chief Complaint   Leg Pain      HPI   Ross Caballero is a 84 year old male with a history of type 2 diabetes, hypertension, prostate cancer who presents with leg pain. Patient states he is getting Lupron shots for metastatic prostate cancer. Yesterday he developed bilateral leg pain that \"feels like shin splints\", constant, 10/10 pain. States the pain radiates up into his lower back at times, keeps him up at night. He mentions calling his oncologist who states this happens with the shots he is taking but not usually to this effect. He denies any history of blood clots, not on blood thinners. Denies any leg swelling.     Independent Historian   None    Review of External Notes   Oncology note from 9/6/2024 reviewed.  History of metastatic prostate cancer.    Past Medical History     Medical History and Problem List   Hypertension   Benign prostatic hyperplasia  Calculus of kidney  Type 2 Diabetes   Melanoma  Obese  Osteoarthritis  Osteomyelitis   Prostate cancer   Pyelonephritis  CKD    Medications   Deltasone   Antivert   Xtandi     Surgical History   Amputation of toe   Colonoscopy   Cystoscopy  Nephrectomy   Hernia repair   Melanoma removal     Physical Exam     Patient Vitals for the past 24 hrs:   BP Temp Temp src Pulse Resp SpO2   09/12/24 1255 127/72 97.1  F (36.2  C) Temporal 69 18 97 %     Physical Exam  General: Sitting on the ED chair  HEENT: Normocephalic, atraumatic  Cardiac: Warm and well perfused, regular rate and rhythm  Pulm: Breathing comfortably, no accessory muscle usage, no conversational dyspnea, and lungs clear bilaterally  MSK: No bony deformities, no calf tenderness BLE, trace edema BLE  Skin: Warm and dry  Neuro: Moves all extremities  Psych: Pleasant mood and affect      Diagnostics     EKG   None     Independent Interpretation   None    ED Course      Medications Administered   Medications - No data to display    Procedures "   Procedures     Discussion of Management   None    ED Course   ED Course as of 09/12/24 2058 Thu Sep 12, 2024   1522 I initially assessed the patient and obtained the above history and physical exam.    1545 I consulted with oncologist about the patient        Additional Documentation  None    Medical Decision Making / Diagnosis     CMS Diagnoses: None    MIPS       None    MDM   Ross Caballero is a 84 year old male with history of stage IV prostate cancer on Lupron injections as well as a monoclonal antibody injection starting last Thursday presents with right lower extremity pain.  He also describes more mild left lower extremity pain that is not present on my evaluation.  There is no calf tenderness or calf pain and I have overall a low suspicion for DVT.  The patient's pain seems to have ramped up gradually in conjunction with his first monoclonal antibody injection a week ago.  I discussed the patient's case with oncology who recommends starting an antihistamine such as Claritin and also a short course of pain medication.  The patient is agreeable to this as well.  No indication at this time for DVT ultrasound, patient declined this as well which seems quite reasonable.  Plan is discharge home on Claritin and a short course of oxycodone with oncology follow-up for further care.    Disposition   The patient was discharged.     Diagnosis     ICD-10-CM    1. Right leg pain  M79.604            Discharge Medications   Discharge Medication List as of 9/12/2024  5:26 PM        START taking these medications    Details   loratadine (CLARITIN) 10 MG tablet Take 1 tablet (10 mg) by mouth daily., Disp-30 tablet, R-0, E-Prescribe      oxyCODONE (ROXICODONE) 5 MG tablet Take 1 tablet (5 mg) by mouth every 6 hours as needed for severe pain., Disp-6 tablet, R-0, E-Prescribe           Scribe Disclosure:  I, Mariusz Lewis, am serving as a scribe at 3:09 PM on 9/12/2024 to document services personally performed by King  MD Emerson based on my observations and the provider's statements to me.        Emerson Conroy MD  09/12/24 2058

## 2024-09-12 NOTE — ED TRIAGE NOTES
"Patient is getting lupron shots for metastatic prostate cancer. Patient is having severe R leg pain, sometimes to L leg. \"Bone pain\". Had shot of james on 9/6 and having increased pain and has not lessened.      Triage Assessment (Adult)       Row Name 09/12/24 1259          Respiratory WDL    Respiratory WDL WDL        Cardiac WDL    Cardiac WDL WDL        Cognitive/Neuro/Behavioral WDL    Cognitive/Neuro/Behavioral WDL WDL                     "

## 2024-09-13 ENCOUNTER — TELEPHONE (OUTPATIENT)
Dept: FAMILY MEDICINE | Facility: CLINIC | Age: 84
End: 2024-09-13
Payer: MEDICARE

## 2024-09-13 NOTE — TELEPHONE ENCOUNTER
Transitions of Care Outreach  Chief Complaint   Patient presents with    Hospital F/U       Most Recent Admission Date: 9/12/2024   Most Recent Admission Diagnosis:      Most Recent Discharge Date: 9/12/2024   Most Recent Discharge Diagnosis: Right leg pain - M79.604     Transitions of Care Assessment    Discharge Assessment  How are you doing now that you are home?: Pain in right lower ext improving.  How are your symptoms? (Red Flag symptoms escalate to triage hotline per guidelines): Improved  Do you know how to contact your clinic care team if you have future questions or changes to your health status? : Yes  Does the patient have their discharge instructions? : Yes  Does the patient have questions regarding their discharge instructions? : No  Were you started on any new medications or were there changes to any of your previous medications? : Yes (Claritin and Oxycodone)  Does the patient have all of their medications?: Yes  Do you have questions regarding any of your medications? : No  Do you have all of your needed medical supplies or equipment (DME)?  (i.e. oxygen tank, CPAP, cane, etc.): Yes    Follow up Plan     Discharge Follow-Up  Discharge follow up appointment scheduled in alignment with recommended follow up timeframe or Transitions of Risk Category? (Low = within 30 days; Moderate= within 14 days; High= within 7 days): No  Patient's follow up appointment not scheduled: Patient declined scheduling support. Education on the importance of transitions of care follow up. Provided scheduling phone number. (Pt plans to f/u with Oncology.  Has annual Wellness visit scheduled with PCP 11/7/24. Prefers to not schedule earlier Hosp/ED f/u with PCP.)    Future Appointments   Date Time Provider Department Center   11/7/2024 10:30 AM Martha Cr MD [facility]       Outpatient Plan as outlined on AVS reviewed with patient.    For any urgent concerns, please contact our 24 hour nurse triage line:  [phone] ([phone])       Dianne Pires RN

## 2024-09-18 ENCOUNTER — TRANSFERRED RECORDS (OUTPATIENT)
Dept: HEALTH INFORMATION MANAGEMENT | Facility: CLINIC | Age: 84
End: 2024-09-18
Payer: MEDICARE

## 2024-09-24 ENCOUNTER — TRANSFERRED RECORDS (OUTPATIENT)
Dept: HEALTH INFORMATION MANAGEMENT | Facility: CLINIC | Age: 84
End: 2024-09-24
Payer: MEDICARE

## 2024-11-03 SDOH — HEALTH STABILITY: PHYSICAL HEALTH: ON AVERAGE, HOW MANY DAYS PER WEEK DO YOU ENGAGE IN MODERATE TO STRENUOUS EXERCISE (LIKE A BRISK WALK)?: 0 DAYS

## 2024-11-03 SDOH — HEALTH STABILITY: PHYSICAL HEALTH: ON AVERAGE, HOW MANY MINUTES DO YOU ENGAGE IN EXERCISE AT THIS LEVEL?: 0 MIN

## 2024-11-03 ASSESSMENT — SOCIAL DETERMINANTS OF HEALTH (SDOH): HOW OFTEN DO YOU GET TOGETHER WITH FRIENDS OR RELATIVES?: PATIENT DECLINED

## 2024-11-07 ENCOUNTER — OFFICE VISIT (OUTPATIENT)
Dept: FAMILY MEDICINE | Facility: CLINIC | Age: 84
End: 2024-11-07
Payer: MEDICARE

## 2024-11-07 VITALS
WEIGHT: 224 LBS | HEIGHT: 68 IN | TEMPERATURE: 98 F | RESPIRATION RATE: 16 BRPM | BODY MASS INDEX: 33.95 KG/M2 | SYSTOLIC BLOOD PRESSURE: 122 MMHG | HEART RATE: 100 BPM | OXYGEN SATURATION: 98 % | DIASTOLIC BLOOD PRESSURE: 73 MMHG

## 2024-11-07 DIAGNOSIS — E11.22 TYPE 2 DIABETES MELLITUS WITH STAGE 3B CHRONIC KIDNEY DISEASE, WITHOUT LONG-TERM CURRENT USE OF INSULIN (H): ICD-10-CM

## 2024-11-07 DIAGNOSIS — N18.32 STAGE 3B CHRONIC KIDNEY DISEASE (H): ICD-10-CM

## 2024-11-07 DIAGNOSIS — C61 PRIMARY MALIGNANT NEOPLASM OF PROSTATE (H): ICD-10-CM

## 2024-11-07 DIAGNOSIS — Z00.00 MEDICARE ANNUAL WELLNESS VISIT, SUBSEQUENT: Primary | ICD-10-CM

## 2024-11-07 DIAGNOSIS — N18.32 TYPE 2 DIABETES MELLITUS WITH STAGE 3B CHRONIC KIDNEY DISEASE, WITHOUT LONG-TERM CURRENT USE OF INSULIN (H): ICD-10-CM

## 2024-11-07 DIAGNOSIS — E66.01 MORBID OBESITY (H): ICD-10-CM

## 2024-11-07 PROCEDURE — G0439 PPPS, SUBSEQ VISIT: HCPCS | Performed by: INTERNAL MEDICINE

## 2024-11-07 RX ORDER — ENZALUTAMIDE 40 MG/1
TABLET ORAL
COMMUNITY
Start: 2024-11-04

## 2024-11-07 RX ORDER — ONDANSETRON 8 MG/1
TABLET, FILM COATED ORAL
COMMUNITY
Start: 2024-09-06

## 2024-11-07 NOTE — PROGRESS NOTES
"Preventive Care Visit  [facility]  Martha Cr MD, Internal Medicine  Nov 7, 2024      Assessment & Plan     Type 2 diabetes mellitus with stage 3 chronic kidney disease, without long-term current use of insulin (H)  - stable, continue current therapy    Medicare annual wellness visit, subsequent  - REVIEW OF HEALTH MAINTENANCE PROTOCOL ORDERS    Stage 3b chronic kidney disease (H)  - stable, continue current therapy    Morbid obesity (H)  - diet, exercise    Prostate cancer  - follow up with oncology clinic    Patient has been advised of split billing requirements and indicates understanding: Yes        BMI  Estimated body mass index is 34.06 kg/m  as calculated from the following:    Height as of this encounter: 1.727 m (5' 8\").    Weight as of this encounter: 101.6 kg (224 lb).   Weight management plan: Discussed healthy diet and exercise guidelines    Counseling  Appropriate preventive services were addressed with this patient via screening, questionnaire, or discussion as appropriate for fall prevention, nutrition, physical activity, Tobacco-use cessation, social engagement, weight loss and cognition.  Checklist reviewing preventive services available has been given to the patient.  Reviewed patient's diet, addressing concerns and/or questions.   He is at risk for psychosocial distress and has been provided with information to reduce risk.   Discussed possible causes of fatigue.     FUTURE APPOINTMENTS:       - Follow-up visit in 1 year    Linda Dueñas is a 84 year old, presenting for the following:  Physical      HPI    Health Care Directive  Patient does not have a Health Care Directive: Discussed advance care planning with patient; information given to patient to review.      11/3/2024   General Health   How would you rate your overall physical health? (!) FAIR   Feel stress (tense, anxious, or unable to sleep) Only a little      (!) STRESS CONCERN      11/3/2024   Nutrition "   Diet: Regular (no restrictions)            11/3/2024   Exercise   Days per week of moderate/strenous exercise 0 days   Average minutes spent exercising at this level 0 min      (!) EXERCISE CONCERN      11/3/2024   Social Factors   Frequency of gathering with friends or relatives Patient declined   Worry food won't last until get money to buy more No   Food not last or not have enough money for food? No   Do you have housing? (Housing is defined as stable permanent housing and does not include staying ouside in a car, in a tent, in an abandoned building, in an overnight shelter, or couch-surfing.) Yes   Are you worried about losing your housing? No   Lack of transportation? No   Unable to get utilities (heat,electricity)? No            11/7/2024   Fall Risk   Fallen 2 or more times in the past year? Yes   Trouble with walking or balance? Yes   Gait Speed Test (Document in seconds) 6.46   Gait Speed Test Interpretation Greater than 5.01 seconds - ABNORMAL             11/3/2024   Activities of Daily Living- Home Safety   Needs help with the following daily activites None of the above   Safety concerns in the home None of the above            11/3/2024   Dental   Dentist two times every year? (!) DECLINE            11/3/2024   Hearing Screening   Hearing concerns? None of the above            11/3/2024   Driving Risk Screening   Patient/family members have concerns about driving (!) DECLINE            11/3/2024   General Alertness/Fatigue Screening   Have you been more tired than usual lately? (!) YES            11/3/2024   Urinary Incontinence Screening   Bothered by leaking urine in past 6 months No            11/3/2024   TB Screening   Were you born outside of the [place]? Yes            Today's PHQ-2 Score:       11/7/2024     9:46 AM   PHQ-2 ( 1999 [employer])   Q1: Little interest or pleasure in doing things 1    Q2: Feeling down, depressed or hopeless 0    PHQ-2 Score 1    Q1: Little interest or pleasure in doing  things Several days   Q2: Feeling down, depressed or hopeless Not at all   PHQ-2 Score 1       Patient-reported           11/3/2024   Substance Use   Alcohol more than 3/day or more than 7/wk No   Do you have a current opioid prescription? No   How severe/bad is pain from 1 to 10? 6/10   Do you use any other substances recreationally? No        Social History     Tobacco Use    Smoking status: Former     Current packs/day: 0.00     Types: Cigarettes     Quit date: 1970     Years since quittin.8    Smokeless tobacco: Never   Vaping Use    Vaping status: Never Used   Substance Use Topics    Alcohol use: Yes     Alcohol/week: 0.0 - 1.0 standard drinks of alcohol    Drug use: No         Reviewed and updated as needed this visit by Provider                    Past Medical History:   Diagnosis Date    Benign essential hypertension     BPH (benign prostatic hyperplasia)     flomax     Calculus of kidney     with lithrotripsy     Diabetes mellitus (H)     Melanoma (H)     Obese     Osteoarthritis, knee     has had synvisc type shot     Osteomyelitis (H)     toe amputation    Prostate cancer (H) 10/18/2019    Pyelonephritis 2016     Current providers sharing in care for this patient include:  Patient Care Team:  Martha Cr MD as PCP - General (Internal Medicine)  Martha Cr MD as Assigned PCP  Jared Chiu MD as Assigned Heart and Vascular Provider    The following health maintenance items are reviewed in Epic and correct as of today:  Health Maintenance   Topic Date Due    ZOSTER IMMUNIZATION (1 of 2) Never done    DTAP/TDAP/TD IMMUNIZATION (1 - Tdap) 2012    RSV VACCINE (1 - 1-dose 75+ series) Never done    Pneumococcal Vaccine: 65+ Years (3 of 3 - PCV) 2017    EYE EXAM  2020    BMP  10/18/2020    DIABETIC FOOT EXAM  10/18/2020    HEMOGLOBIN  2022    A1C  2024    INFLUENZA VACCINE (1) 2024    COVID-19 Vaccine (4 - - season) 2024     "LIPID  11/02/2024    MICROALBUMIN  11/02/2024    ANNUAL REVIEW OF HM ORDERS  11/02/2024    MEDICARE ANNUAL WELLNESS VISIT  11/02/2024    FALL RISK ASSESSMENT  11/07/2025    ADVANCE CARE PLANNING  11/02/2028    PHQ-2 (once per calendar year)  Completed    URINALYSIS  Completed    HPV IMMUNIZATION  Aged Out    MENINGITIS IMMUNIZATION  Aged Out    RSV MONOCLONAL ANTIBODY  Aged Out         Review of Systems  Constitutional, HEENT, cardiovascular, pulmonary, GI, , musculoskeletal, neuro, skin, endocrine and psych systems are negative, except as otherwise noted.     Objective    Exam  /73 (BP Location: Right arm, Patient Position: Sitting, Cuff Size: Adult Large)   Pulse 100   Temp 98  F (36.7  C) (Oral)   Resp 16   Ht 1.727 m (5' 8\")   Wt 101.6 kg (224 lb)   SpO2 98%   BMI 34.06 kg/m     Estimated body mass index is 34.06 kg/m  as calculated from the following:    Height as of this encounter: 1.727 m (5' 8\").    Weight as of this encounter: 101.6 kg (224 lb).    Physical Exam  GENERAL: alert and no distress  EYES: Eyes grossly normal to inspection, conjunctivae and sclerae normal  HENT: ear canals and TM's normal, nose and mouth without ulcers or lesions  NECK: no asymmetry  RESP: lungs clear to auscultation - no rales, rhonchi or wheezes  CV: regular rate and rhythm, normal S1 S2  ABDOMEN: soft, nontender, bowel sounds normal  MS: no gross musculoskeletal defects noted, no edema  SKIN: no suspicious lesions or rashes  NEURO: Normal strength and tone, mentation intact and speech normal  PSYCH: mentation appears normal, affect normal/bright        11/7/2024   Mini Cog   Clock Draw Score 2 Normal   3 Item Recall 3 objects recalled   Mini Cog Total Score 5                 Signed Electronically by: Martha Cr MD    "

## 2024-11-09 PROBLEM — E11.621 DIABETIC ULCER OF LEFT HEEL ASSOCIATED WITH TYPE 2 DIABETES MELLITUS, WITH FAT LAYER EXPOSED (H): Status: RESOLVED | Noted: 2021-07-19 | Resolved: 2024-11-09

## 2024-11-09 PROBLEM — L97.422 DIABETIC ULCER OF LEFT HEEL ASSOCIATED WITH TYPE 2 DIABETES MELLITUS, WITH FAT LAYER EXPOSED (H): Status: RESOLVED | Noted: 2021-07-19 | Resolved: 2024-11-09

## 2024-11-24 ENCOUNTER — HEALTH MAINTENANCE LETTER (OUTPATIENT)
Age: 84
End: 2024-11-24

## 2024-11-27 NOTE — TELEPHONE ENCOUNTER
Attempted calling patient. Left VM for patient to call back.    Hema Farley, VF     Review of chart shows Left Heel ulcer etiology: DFU and Pressure. Will plan an appt with Dr Anand as able.

## 2024-12-06 ENCOUNTER — TRANSFERRED RECORDS (OUTPATIENT)
Dept: HEALTH INFORMATION MANAGEMENT | Facility: CLINIC | Age: 84
End: 2024-12-06
Payer: MEDICARE

## 2025-01-21 ENCOUNTER — TRANSFERRED RECORDS (OUTPATIENT)
Dept: HEALTH INFORMATION MANAGEMENT | Facility: CLINIC | Age: 85
End: 2025-01-21
Payer: MEDICARE

## 2025-02-24 ENCOUNTER — TRANSFERRED RECORDS (OUTPATIENT)
Dept: HEALTH INFORMATION MANAGEMENT | Facility: CLINIC | Age: 85
End: 2025-02-24
Payer: MEDICARE

## 2025-03-11 ENCOUNTER — TRANSFERRED RECORDS (OUTPATIENT)
Dept: HEALTH INFORMATION MANAGEMENT | Facility: CLINIC | Age: 85
End: 2025-03-11
Payer: MEDICARE

## 2025-05-20 ENCOUNTER — TRANSFERRED RECORDS (OUTPATIENT)
Dept: HEALTH INFORMATION MANAGEMENT | Facility: CLINIC | Age: 85
End: 2025-05-20
Payer: MEDICARE

## 2025-06-07 ENCOUNTER — HEALTH MAINTENANCE LETTER (OUTPATIENT)
Age: 85
End: 2025-06-07

## 2025-07-15 ENCOUNTER — TRANSFERRED RECORDS (OUTPATIENT)
Dept: HEALTH INFORMATION MANAGEMENT | Facility: CLINIC | Age: 85
End: 2025-07-15
Payer: MEDICARE

## (undated) DEVICE — DRAPE IOBAN INCISE 23X17" 6650EZ

## (undated) DEVICE — SOL WATER IRRIG 3000ML BAG 2B7117

## (undated) DEVICE — SYR 50ML CATH TIP W/O NDL 309620

## (undated) DEVICE — TUBING SUCTION 12"X1/4" N612

## (undated) DEVICE — Device

## (undated) DEVICE — SOL WATER IRRIG 1000ML BOTTLE 2F7114

## (undated) DEVICE — DRAPE SHEET REV FOLD 3/4 9349

## (undated) DEVICE — SYR BULB IRRIG 50ML LATEX FREE 0035280

## (undated) DEVICE — LINEN TOWEL PACK X5 5464

## (undated) DEVICE — GUIDEWIRE ZIPWIRE STR STIFF .035"X150CM M006630222B0

## (undated) DEVICE — RAD RX ISOVUE 300 (50ML) 61% IOPAMIDOL CHARGE PER ML

## (undated) DEVICE — SOL NACL 0.9% INJ 1000ML BAG 2B1324X

## (undated) DEVICE — DRAPE GYN/UROLOGY FLUID POUCH TUR 29455

## (undated) DEVICE — CATH URETERAL OPEN END 6FR AXXCESS

## (undated) DEVICE — CATH HOLDER STRAP 36600

## (undated) DEVICE — SUCTION TIP YANKAUER VIAGUARD W/SLEEVE SMP-2006-001SS

## (undated) DEVICE — SOL NACL 0.9% IRRIG 1000ML BOTTLE 2F7124

## (undated) DEVICE — PACK CYSTOSCOPY SBA15CYFSI

## (undated) DEVICE — DRAPE POUCH IRR 1016

## (undated) DEVICE — GLOVE PROTEXIS W/NEU-THERA 7.5  2D73TE75

## (undated) DEVICE — PACK TUR CUSTOM SBA15RUFSE

## (undated) DEVICE — DRSG TEGADERM 4X4 3/4" 1626

## (undated) DEVICE — CATH FOLEY COUDE 18FR 5ML LATEX

## (undated) DEVICE — DRSG GAUZE 4X4" 3033

## (undated) DEVICE — ESU GROUND PAD UNIVERSAL W/O CORD

## (undated) DEVICE — STPL SKIN PROXIMATE 35 WIDE PMW35

## (undated) DEVICE — DRAPE UNDER BUTTOCK 8483

## (undated) DEVICE — PAD CHUX UNDERPAD 23X24" 7136

## (undated) DEVICE — SOL NACL 0.9% IRRIG 3000ML BAG 2B7477

## (undated) DEVICE — ESU HOLSTER PLASTIC DISP E2400

## (undated) DEVICE — SUPPORTER ATHLETIC LG LATEX 202636

## (undated) DEVICE — JELLY LUBRICATING SURGILUBE 4OZ TUBE

## (undated) RX ORDER — PROPOFOL 10 MG/ML
INJECTION, EMULSION INTRAVENOUS
Status: DISPENSED
Start: 2021-11-16

## (undated) RX ORDER — LIDOCAINE HYDROCHLORIDE 20 MG/ML
JELLY TOPICAL
Status: DISPENSED
Start: 2021-11-16

## (undated) RX ORDER — PROPOFOL 10 MG/ML
INJECTION, EMULSION INTRAVENOUS
Status: DISPENSED
Start: 2018-09-27

## (undated) RX ORDER — ONDANSETRON 2 MG/ML
INJECTION INTRAMUSCULAR; INTRAVENOUS
Status: DISPENSED
Start: 2021-11-16

## (undated) RX ORDER — FENTANYL CITRATE 50 UG/ML
INJECTION, SOLUTION INTRAMUSCULAR; INTRAVENOUS
Status: DISPENSED
Start: 2018-09-27

## (undated) RX ORDER — ATROPA BELLADONNA AND OPIUM 16.2; 3 MG/1; MG/1
SUPPOSITORY RECTAL
Status: DISPENSED
Start: 2021-11-16

## (undated) RX ORDER — ACETAMINOPHEN 325 MG/1
TABLET ORAL
Status: DISPENSED
Start: 2021-11-16

## (undated) RX ORDER — CEFAZOLIN SODIUM 1 G/50ML
SOLUTION INTRAVENOUS
Status: DISPENSED
Start: 2018-09-27

## (undated) RX ORDER — FENTANYL CITRATE 50 UG/ML
INJECTION, SOLUTION INTRAMUSCULAR; INTRAVENOUS
Status: DISPENSED
Start: 2021-11-16

## (undated) RX ORDER — FUROSEMIDE 10 MG/ML
INJECTION INTRAMUSCULAR; INTRAVENOUS
Status: DISPENSED
Start: 2021-11-16

## (undated) RX ORDER — HYDROMORPHONE HYDROCHLORIDE 1 MG/ML
INJECTION, SOLUTION INTRAMUSCULAR; INTRAVENOUS; SUBCUTANEOUS
Status: DISPENSED
Start: 2018-09-27